# Patient Record
Sex: MALE | Race: WHITE | NOT HISPANIC OR LATINO
[De-identification: names, ages, dates, MRNs, and addresses within clinical notes are randomized per-mention and may not be internally consistent; named-entity substitution may affect disease eponyms.]

---

## 2018-04-09 ENCOUNTER — APPOINTMENT (OUTPATIENT)
Dept: ORTHOPEDIC SURGERY | Facility: CLINIC | Age: 63
End: 2018-04-09
Payer: COMMERCIAL

## 2018-04-09 VITALS
TEMPERATURE: 98.2 F | SYSTOLIC BLOOD PRESSURE: 114 MMHG | HEIGHT: 67 IN | HEART RATE: 67 BPM | BODY MASS INDEX: 28.25 KG/M2 | DIASTOLIC BLOOD PRESSURE: 77 MMHG | WEIGHT: 180 LBS

## 2018-04-09 DIAGNOSIS — Z96.659 INFECTION AND INFLAMMATORY REACTION DUE TO OTHER INTERNAL JOINT PROSTHESIS, INITIAL ENCOUNTER: ICD-10-CM

## 2018-04-09 DIAGNOSIS — T84.84XA PAIN DUE TO INTERNAL ORTHOPEDIC PROSTHETIC DEVICES, IMPLANTS AND GRAFTS, INITIAL ENCOUNTER: ICD-10-CM

## 2018-04-09 DIAGNOSIS — T84.59XA INFECTION AND INFLAMMATORY REACTION DUE TO OTHER INTERNAL JOINT PROSTHESIS, INITIAL ENCOUNTER: ICD-10-CM

## 2018-04-09 DIAGNOSIS — Z96.651 PAIN DUE TO INTERNAL ORTHOPEDIC PROSTHETIC DEVICES, IMPLANTS AND GRAFTS, INITIAL ENCOUNTER: ICD-10-CM

## 2018-04-09 PROCEDURE — 73562 X-RAY EXAM OF KNEE 3: CPT | Mod: RT

## 2018-04-09 PROCEDURE — 99203 OFFICE O/P NEW LOW 30 MIN: CPT

## 2018-04-26 ENCOUNTER — OUTPATIENT (OUTPATIENT)
Dept: OUTPATIENT SERVICES | Facility: HOSPITAL | Age: 63
LOS: 1 days | Discharge: HOME | End: 2018-04-26
Payer: COMMERCIAL

## 2018-04-26 VITALS
WEIGHT: 175.49 LBS | TEMPERATURE: 98 F | RESPIRATION RATE: 18 BRPM | HEIGHT: 67 IN | DIASTOLIC BLOOD PRESSURE: 73 MMHG | HEART RATE: 60 BPM | OXYGEN SATURATION: 97 % | SYSTOLIC BLOOD PRESSURE: 105 MMHG

## 2018-04-26 DIAGNOSIS — M79.605 PAIN IN LEFT LEG: Chronic | ICD-10-CM

## 2018-04-26 DIAGNOSIS — I10 ESSENTIAL (PRIMARY) HYPERTENSION: ICD-10-CM

## 2018-04-26 DIAGNOSIS — I48.91 UNSPECIFIED ATRIAL FIBRILLATION: ICD-10-CM

## 2018-04-26 DIAGNOSIS — M25.569 PAIN IN UNSPECIFIED KNEE: ICD-10-CM

## 2018-04-26 DIAGNOSIS — Z98.890 OTHER SPECIFIED POSTPROCEDURAL STATES: Chronic | ICD-10-CM

## 2018-04-26 DIAGNOSIS — Z01.818 ENCOUNTER FOR OTHER PREPROCEDURAL EXAMINATION: ICD-10-CM

## 2018-04-26 DIAGNOSIS — T84.59XA INFECTION AND INFLAMMATORY REACTION DUE TO OTHER INTERNAL JOINT PROSTHESIS, INITIAL ENCOUNTER: ICD-10-CM

## 2018-04-26 DIAGNOSIS — N40.0 BENIGN PROSTATIC HYPERPLASIA WITHOUT LOWER URINARY TRACT SYMPTOMS: ICD-10-CM

## 2018-04-26 DIAGNOSIS — Z96.659 PRESENCE OF UNSPECIFIED ARTIFICIAL KNEE JOINT: Chronic | ICD-10-CM

## 2018-04-26 LAB
ANION GAP SERPL CALC-SCNC: 7 MMOL/L — SIGNIFICANT CHANGE UP (ref 5–17)
APTT BLD: 39.4 SEC — HIGH (ref 27.5–37.4)
BASOPHILS # BLD AUTO: 0.07 K/UL — SIGNIFICANT CHANGE UP (ref 0–0.2)
BASOPHILS NFR BLD AUTO: 0.9 % — SIGNIFICANT CHANGE UP (ref 0–2)
BUN SERPL-MCNC: 18 MG/DL — SIGNIFICANT CHANGE UP (ref 7–23)
CALCIUM SERPL-MCNC: 9.6 MG/DL — SIGNIFICANT CHANGE UP (ref 8.5–10.1)
CHLORIDE SERPL-SCNC: 104 MMOL/L — SIGNIFICANT CHANGE UP (ref 96–108)
CO2 SERPL-SCNC: 28 MMOL/L — SIGNIFICANT CHANGE UP (ref 22–31)
CREAT SERPL-MCNC: 0.79 MG/DL — SIGNIFICANT CHANGE UP (ref 0.5–1.3)
EOSINOPHIL # BLD AUTO: 0.25 K/UL — SIGNIFICANT CHANGE UP (ref 0–0.5)
EOSINOPHIL NFR BLD AUTO: 3.3 % — SIGNIFICANT CHANGE UP (ref 0–6)
ESTIMATED AVERAGE GLUCOSE: 114 MG/DL — SIGNIFICANT CHANGE UP (ref 68–114)
GLUCOSE SERPL-MCNC: 99 MG/DL — SIGNIFICANT CHANGE UP (ref 70–99)
HBA1C BLD-MCNC: 5.6 % — SIGNIFICANT CHANGE UP (ref 4–5.6)
HBA1C BLD-MCNC: 5.6 % — SIGNIFICANT CHANGE UP (ref 4–5.6)
HCT VFR BLD CALC: 39 % — SIGNIFICANT CHANGE UP (ref 39–50)
HGB BLD-MCNC: 11.9 G/DL — LOW (ref 13–17)
IMM GRANULOCYTES NFR BLD AUTO: 0.4 % — SIGNIFICANT CHANGE UP (ref 0–1.5)
INR BLD: 1.16 RATIO — SIGNIFICANT CHANGE UP (ref 0.88–1.16)
LYMPHOCYTES # BLD AUTO: 1.51 K/UL — SIGNIFICANT CHANGE UP (ref 1–3.3)
LYMPHOCYTES # BLD AUTO: 19.9 % — SIGNIFICANT CHANGE UP (ref 13–44)
MCHC RBC-ENTMCNC: 25 PG — LOW (ref 27–34)
MCHC RBC-ENTMCNC: 30.5 GM/DL — LOW (ref 32–36)
MCV RBC AUTO: 81.9 FL — SIGNIFICANT CHANGE UP (ref 80–100)
MONOCYTES # BLD AUTO: 0.69 K/UL — SIGNIFICANT CHANGE UP (ref 0–0.9)
MONOCYTES NFR BLD AUTO: 9.1 % — SIGNIFICANT CHANGE UP (ref 2–14)
MRSA PCR RESULT.: DETECTED
NEUTROPHILS # BLD AUTO: 5.03 K/UL — SIGNIFICANT CHANGE UP (ref 1.8–7.4)
NEUTROPHILS NFR BLD AUTO: 66.4 % — SIGNIFICANT CHANGE UP (ref 43–77)
PLATELET # BLD AUTO: 291 K/UL — SIGNIFICANT CHANGE UP (ref 150–400)
POTASSIUM SERPL-MCNC: 4.1 MMOL/L — SIGNIFICANT CHANGE UP (ref 3.5–5.3)
POTASSIUM SERPL-SCNC: 4.1 MMOL/L — SIGNIFICANT CHANGE UP (ref 3.5–5.3)
PROTHROM AB SERPL-ACNC: 12.7 SEC — SIGNIFICANT CHANGE UP (ref 9.8–12.7)
RBC # BLD: 4.76 M/UL — SIGNIFICANT CHANGE UP (ref 4.2–5.8)
RBC # FLD: 18.7 % — HIGH (ref 10.3–14.5)
S AUREUS DNA NOSE QL NAA+PROBE: DETECTED
SODIUM SERPL-SCNC: 139 MMOL/L — SIGNIFICANT CHANGE UP (ref 135–145)
WBC # BLD: 7.58 K/UL — SIGNIFICANT CHANGE UP (ref 3.8–10.5)
WBC # FLD AUTO: 7.58 K/UL — SIGNIFICANT CHANGE UP (ref 3.8–10.5)

## 2018-04-26 PROCEDURE — 93010 ELECTROCARDIOGRAM REPORT: CPT | Mod: NC

## 2018-04-26 NOTE — H&P PST ADULT - PMH
Afib    BPH without urinary obstruction    HTN (hypertension)    Wound  right leg - wound care and IV antibiotics

## 2018-04-26 NOTE — PHYSICAL THERAPY INITIAL EVALUATION ADULT - ADDITIONAL COMMENTS
Pt lives with his wife (whom does not work & can provide assistance for all functional mobility as needed upon D/C home) in a private home. One entry step (no rail). Pt states there is a fence that he can hold onto. PT recommends installation of railing or grab bar-pt demonstrates understanding. Pt is independent with all functional mobility including community ambulation without a device. Pt independent with ADL's. Pt is right hand dominant, wears eye glasses for reading, drives, has bilateral hearing aids, & is retired. Pt owns straight cane. Pt has walk in shower stall with retractable shower head & grab bars. Toilet is standard height. Pt reports limited pain throughout the day (no pain meds) but states he avoids stairs as that is the most difficult task to perform. Goal of therapy: improve functional mobility.

## 2018-04-26 NOTE — PATIENT PROFILE ADULT. - PMH
Afib    BPH without urinary obstruction    HTN (hypertension)    PICC (peripherally inserted central catheter) in place  - Left Arm  Wound  right leg - wound care and IV antibiotics

## 2018-04-26 NOTE — PATIENT PROFILE ADULT. - ABILITY TO HEAR (WITH HEARING AID OR HEARING APPLIANCE IF NORMALLY USED):
WEAR BILATERAL HEARING AIDS/Mildly to Moderately Impaired: difficulty hearing in some environments or speaker may need to increase volume or speak distinctly

## 2018-04-26 NOTE — PHYSICAL THERAPY INITIAL EVALUATION ADULT - MODIFIED CLINICAL TEST OF SENSORY INTEGRATION IN BALANCE TEST
5x sit to stand = 17.56 seconds. 2MWT = 320ft with no device (antalgic gait, + decreased becky & step length). Stairs: step-to-step pattern with railing on R for ascent.

## 2018-04-26 NOTE — H&P PST ADULT - HISTORY OF PRESENT ILLNESS
61 yo male -s/p right knee replacement in 2013- did well but recently had cellulitis of right leg- developed infection requiring iv antibiotics and - developed a wound requiring wound care and infectious disease evaluations currently has wound of right leg, had IV antibiotics via picc line - scheduled for removal of right knee device for spacer and antibiotics until ready for revision of right knee arthroplasty.  PMH - afib htn, hyperlipidemia

## 2018-04-26 NOTE — H&P PST ADULT - NSANTHOSAYNRD_GEN_A_CORE
No. JEFFREY screening performed.  STOP BANG Legend: 0-2 = LOW Risk; 3-4 = INTERMEDIATE Risk; 5-8 = HIGH Risk/denies

## 2018-04-27 DIAGNOSIS — T14.90XA INJURY, UNSPECIFIED, INITIAL ENCOUNTER: ICD-10-CM

## 2018-04-27 RX ORDER — MUPIROCIN 20 MG/G
1 OINTMENT TOPICAL
Qty: 1 | Refills: 0 | OUTPATIENT
Start: 2018-04-27 | End: 2018-05-01

## 2018-05-02 ENCOUNTER — TRANSCRIPTION ENCOUNTER (OUTPATIENT)
Age: 63
End: 2018-05-02

## 2018-05-03 ENCOUNTER — INPATIENT (INPATIENT)
Facility: HOSPITAL | Age: 63
LOS: 3 days | Discharge: HOME HEALTH | End: 2018-05-07
Attending: ORTHOPAEDIC SURGERY | Admitting: ORTHOPAEDIC SURGERY
Payer: COMMERCIAL

## 2018-05-03 ENCOUNTER — RESULT REVIEW (OUTPATIENT)
Age: 63
End: 2018-05-03

## 2018-05-03 VITALS — WEIGHT: 175.05 LBS | HEIGHT: 67 IN

## 2018-05-03 DIAGNOSIS — Z96.659 PRESENCE OF UNSPECIFIED ARTIFICIAL KNEE JOINT: Chronic | ICD-10-CM

## 2018-05-03 DIAGNOSIS — M79.605 PAIN IN LEFT LEG: Chronic | ICD-10-CM

## 2018-05-03 DIAGNOSIS — Z98.890 OTHER SPECIFIED POSTPROCEDURAL STATES: Chronic | ICD-10-CM

## 2018-05-03 LAB
HCT VFR BLD CALC: 35.2 % — LOW (ref 39–50)
HGB BLD-MCNC: 10.5 G/DL — LOW (ref 13–17)
MCHC RBC-ENTMCNC: 24.6 PG — LOW (ref 27–34)
MCHC RBC-ENTMCNC: 29.8 GM/DL — LOW (ref 32–36)
MCV RBC AUTO: 82.6 FL — SIGNIFICANT CHANGE UP (ref 80–100)
NRBC # BLD: 0 /100 WBCS — SIGNIFICANT CHANGE UP (ref 0–0)
PLATELET # BLD AUTO: 222 K/UL — SIGNIFICANT CHANGE UP (ref 150–400)
RBC # BLD: 4.26 M/UL — SIGNIFICANT CHANGE UP (ref 4.2–5.8)
RBC # FLD: 18.6 % — HIGH (ref 10.3–14.5)
WBC # BLD: 8.59 K/UL — SIGNIFICANT CHANGE UP (ref 3.8–10.5)
WBC # FLD AUTO: 8.59 K/UL — SIGNIFICANT CHANGE UP (ref 3.8–10.5)

## 2018-05-03 PROCEDURE — 73560 X-RAY EXAM OF KNEE 1 OR 2: CPT | Mod: 26,RT

## 2018-05-03 PROCEDURE — 88300 SURGICAL PATH GROSS: CPT | Mod: 26,59

## 2018-05-03 PROCEDURE — 99223 1ST HOSP IP/OBS HIGH 75: CPT

## 2018-05-03 PROCEDURE — 88305 TISSUE EXAM BY PATHOLOGIST: CPT | Mod: 26

## 2018-05-03 RX ORDER — SENNA PLUS 8.6 MG/1
2 TABLET ORAL AT BEDTIME
Qty: 0 | Refills: 0 | Status: DISCONTINUED | OUTPATIENT
Start: 2018-05-03 | End: 2018-05-07

## 2018-05-03 RX ORDER — MAGNESIUM HYDROXIDE 400 MG/1
30 TABLET, CHEWABLE ORAL DAILY
Qty: 0 | Refills: 0 | Status: DISCONTINUED | OUTPATIENT
Start: 2018-05-03 | End: 2018-05-07

## 2018-05-03 RX ORDER — SODIUM CHLORIDE 9 MG/ML
1000 INJECTION, SOLUTION INTRAVENOUS
Qty: 0 | Refills: 0 | Status: DISCONTINUED | OUTPATIENT
Start: 2018-05-03 | End: 2018-05-03

## 2018-05-03 RX ORDER — OXYCODONE HYDROCHLORIDE 5 MG/1
5 TABLET ORAL EVERY 4 HOURS
Qty: 0 | Refills: 0 | Status: DISCONTINUED | OUTPATIENT
Start: 2018-05-03 | End: 2018-05-07

## 2018-05-03 RX ORDER — MORPHINE SULFATE 50 MG/1
2 CAPSULE, EXTENDED RELEASE ORAL
Qty: 0 | Refills: 0 | Status: DISCONTINUED | OUTPATIENT
Start: 2018-05-03 | End: 2018-05-03

## 2018-05-03 RX ORDER — PANTOPRAZOLE SODIUM 20 MG/1
40 TABLET, DELAYED RELEASE ORAL DAILY
Qty: 0 | Refills: 0 | Status: DISCONTINUED | OUTPATIENT
Start: 2018-05-03 | End: 2018-05-07

## 2018-05-03 RX ORDER — FOLIC ACID 0.8 MG
1 TABLET ORAL DAILY
Qty: 0 | Refills: 0 | Status: DISCONTINUED | OUTPATIENT
Start: 2018-05-03 | End: 2018-05-07

## 2018-05-03 RX ORDER — ACETAMINOPHEN 500 MG
650 TABLET ORAL EVERY 6 HOURS
Qty: 0 | Refills: 0 | Status: DISCONTINUED | OUTPATIENT
Start: 2018-05-03 | End: 2018-05-07

## 2018-05-03 RX ORDER — OXYCODONE HYDROCHLORIDE 5 MG/1
20 TABLET ORAL ONCE
Qty: 0 | Refills: 0 | Status: DISCONTINUED | OUTPATIENT
Start: 2018-05-03 | End: 2018-05-03

## 2018-05-03 RX ORDER — VANCOMYCIN HCL 1 G
1000 VIAL (EA) INTRAVENOUS EVERY 12 HOURS
Qty: 0 | Refills: 0 | Status: DISCONTINUED | OUTPATIENT
Start: 2018-05-04 | End: 2018-05-07

## 2018-05-03 RX ORDER — ACETAMINOPHEN 500 MG
650 TABLET ORAL ONCE
Qty: 0 | Refills: 0 | Status: COMPLETED | OUTPATIENT
Start: 2018-05-03 | End: 2018-05-03

## 2018-05-03 RX ORDER — SODIUM CHLORIDE 9 MG/ML
1000 INJECTION INTRAMUSCULAR; INTRAVENOUS; SUBCUTANEOUS
Qty: 0 | Refills: 0 | Status: DISCONTINUED | OUTPATIENT
Start: 2018-05-03 | End: 2018-05-07

## 2018-05-03 RX ORDER — POLYETHYLENE GLYCOL 3350 17 G/17G
17 POWDER, FOR SOLUTION ORAL DAILY
Qty: 0 | Refills: 0 | Status: DISCONTINUED | OUTPATIENT
Start: 2018-05-03 | End: 2018-05-07

## 2018-05-03 RX ORDER — CEFAZOLIN SODIUM 1 G
2000 VIAL (EA) INJECTION EVERY 8 HOURS
Qty: 0 | Refills: 0 | Status: DISCONTINUED | OUTPATIENT
Start: 2018-05-03 | End: 2018-05-03

## 2018-05-03 RX ORDER — HYDROMORPHONE HYDROCHLORIDE 2 MG/ML
1 INJECTION INTRAMUSCULAR; INTRAVENOUS; SUBCUTANEOUS EVERY 4 HOURS
Qty: 0 | Refills: 0 | Status: DISCONTINUED | OUTPATIENT
Start: 2018-05-03 | End: 2018-05-07

## 2018-05-03 RX ORDER — ASCORBIC ACID 60 MG
500 TABLET,CHEWABLE ORAL
Qty: 0 | Refills: 0 | Status: DISCONTINUED | OUTPATIENT
Start: 2018-05-03 | End: 2018-05-07

## 2018-05-03 RX ORDER — TRANEXAMIC ACID 100 MG/ML
1000 INJECTION, SOLUTION INTRAVENOUS ONCE
Qty: 0 | Refills: 0 | Status: COMPLETED | OUTPATIENT
Start: 2018-05-03 | End: 2018-05-03

## 2018-05-03 RX ORDER — RIVAROXABAN 15 MG-20MG
10 KIT ORAL DAILY
Qty: 0 | Refills: 0 | Status: DISCONTINUED | OUTPATIENT
Start: 2018-05-05 | End: 2018-05-07

## 2018-05-03 RX ORDER — METOPROLOL TARTRATE 50 MG
50 TABLET ORAL
Qty: 0 | Refills: 0 | Status: DISCONTINUED | OUTPATIENT
Start: 2018-05-03 | End: 2018-05-04

## 2018-05-03 RX ORDER — CELECOXIB 200 MG/1
200 CAPSULE ORAL EVERY 12 HOURS
Qty: 0 | Refills: 0 | Status: DISCONTINUED | OUTPATIENT
Start: 2018-05-04 | End: 2018-05-07

## 2018-05-03 RX ORDER — TAMSULOSIN HYDROCHLORIDE 0.4 MG/1
0.4 CAPSULE ORAL DAILY
Qty: 0 | Refills: 0 | Status: DISCONTINUED | OUTPATIENT
Start: 2018-05-03 | End: 2018-05-07

## 2018-05-03 RX ORDER — ONDANSETRON 8 MG/1
4 TABLET, FILM COATED ORAL EVERY 6 HOURS
Qty: 0 | Refills: 0 | Status: DISCONTINUED | OUTPATIENT
Start: 2018-05-03 | End: 2018-05-05

## 2018-05-03 RX ORDER — DOCUSATE SODIUM 100 MG
100 CAPSULE ORAL THREE TIMES A DAY
Qty: 0 | Refills: 0 | Status: DISCONTINUED | OUTPATIENT
Start: 2018-05-03 | End: 2018-05-07

## 2018-05-03 RX ORDER — CEFEPIME 1 G/1
2000 INJECTION, POWDER, FOR SOLUTION INTRAMUSCULAR; INTRAVENOUS EVERY 12 HOURS
Qty: 0 | Refills: 0 | Status: DISCONTINUED | OUTPATIENT
Start: 2018-05-03 | End: 2018-05-07

## 2018-05-03 RX ORDER — ATORVASTATIN CALCIUM 80 MG/1
10 TABLET, FILM COATED ORAL DAILY
Qty: 0 | Refills: 0 | Status: DISCONTINUED | OUTPATIENT
Start: 2018-05-03 | End: 2018-05-07

## 2018-05-03 RX ORDER — SODIUM CHLORIDE 9 MG/ML
3 INJECTION INTRAMUSCULAR; INTRAVENOUS; SUBCUTANEOUS EVERY 8 HOURS
Qty: 0 | Refills: 0 | Status: DISCONTINUED | OUTPATIENT
Start: 2018-05-03 | End: 2018-05-03

## 2018-05-03 RX ORDER — CELECOXIB 200 MG/1
200 CAPSULE ORAL ONCE
Qty: 0 | Refills: 0 | Status: COMPLETED | OUTPATIENT
Start: 2018-05-03 | End: 2018-05-03

## 2018-05-03 RX ORDER — OXYCODONE HYDROCHLORIDE 5 MG/1
10 TABLET ORAL EVERY 4 HOURS
Qty: 0 | Refills: 0 | Status: DISCONTINUED | OUTPATIENT
Start: 2018-05-03 | End: 2018-05-07

## 2018-05-03 RX ADMIN — Medication 650 MILLIGRAM(S): at 12:07

## 2018-05-03 RX ADMIN — OXYCODONE HYDROCHLORIDE 20 MILLIGRAM(S): 5 TABLET ORAL at 12:07

## 2018-05-03 RX ADMIN — CEFEPIME 100 MILLIGRAM(S): 1 INJECTION, POWDER, FOR SOLUTION INTRAMUSCULAR; INTRAVENOUS at 22:53

## 2018-05-03 RX ADMIN — TRANEXAMIC ACID 220 MILLIGRAM(S): 100 INJECTION, SOLUTION INTRAVENOUS at 17:50

## 2018-05-03 RX ADMIN — CELECOXIB 200 MILLIGRAM(S): 200 CAPSULE ORAL at 12:07

## 2018-05-03 RX ADMIN — SODIUM CHLORIDE 100 MILLILITER(S): 9 INJECTION, SOLUTION INTRAVENOUS at 17:50

## 2018-05-03 NOTE — PROGRESS NOTE ADULT - SUBJECTIVE AND OBJECTIVE BOX
Ortho Postop Check:    61yo Male seen and examined at bedside s/p Revision of R TKA, removal of hardware and insertion of antibiotic spacer POD 0. Patient denies complaints, and pain well controlled at this time. Tolerating diet, no N/V.     Vital Signs Last 24 Hrs  T(F): 96.8 (05-03-18 @ 19:55), Max: 97.5 (05-03-18 @ 12:04)  HR: 58 (05-03-18 @ 20:25)  BP: 99/59 (05-03-18 @ 20:25)  RR: 15 (05-03-18 @ 20:25)  SpO2: 98% (05-03-18 @ 20:25)    Physical Exam:    GENERAL: A&Ox3, NAD  CHEST/LUNG: Clear to auscultation bilaterally, respirations nonlabored  HEART: S1S2, Regular rate and rhythm  ABDOMEN: Soft, NT/ND  EXTREMITIES: Right Knee dressing CDI with knee immobilizer in place. LISA and Hemovac drains intact draining bloody fluid. Sensation and strength intact to LEs b/l, +dorsiflex/plantar flex. Compartments soft, and calf nontender. 2+ DP/PT pulses b/l.     Assessment: 62M s/p Removal of hardware from right TKA and insertion of antibiotic spacer POD 0.     Plan:  -ABX and follow up ID consult  -Reg diet as tolerated  -Continue local wound care, LISA/Hemovac drain care- monitor output  -knee immobilizer to keep Right knee in extension  -pain management prn  -Prophylactic measures: DVT prophylaxis, encourage Incentive Spirometer, OOB, NWB to RLE, Physical therapy  -continue current medical management/supportive care  -F/u am labs  -will discuss with ortho attending

## 2018-05-03 NOTE — PATIENT PROFILE ADULT. - PSH
Acute leg pain, left  s/p femur surgery - gisela  S/P inguinal hernia repair  Right  S/P knee replacement  bilateral

## 2018-05-04 ENCOUNTER — TRANSCRIPTION ENCOUNTER (OUTPATIENT)
Age: 63
End: 2018-05-04

## 2018-05-04 LAB
ANION GAP SERPL CALC-SCNC: 7 MMOL/L — SIGNIFICANT CHANGE UP (ref 5–17)
BUN SERPL-MCNC: 19 MG/DL — SIGNIFICANT CHANGE UP (ref 7–23)
CALCIUM SERPL-MCNC: 9.1 MG/DL — SIGNIFICANT CHANGE UP (ref 8.5–10.1)
CHLORIDE SERPL-SCNC: 104 MMOL/L — SIGNIFICANT CHANGE UP (ref 96–108)
CO2 SERPL-SCNC: 28 MMOL/L — SIGNIFICANT CHANGE UP (ref 22–31)
CREAT SERPL-MCNC: 0.73 MG/DL — SIGNIFICANT CHANGE UP (ref 0.5–1.3)
GLUCOSE SERPL-MCNC: 90 MG/DL — SIGNIFICANT CHANGE UP (ref 70–99)
GRAM STN FLD: SIGNIFICANT CHANGE UP
HCT VFR BLD CALC: 33.2 % — LOW (ref 39–50)
HGB BLD-MCNC: 10 G/DL — LOW (ref 13–17)
MCHC RBC-ENTMCNC: 25 PG — LOW (ref 27–34)
MCHC RBC-ENTMCNC: 30.1 GM/DL — LOW (ref 32–36)
MCV RBC AUTO: 83 FL — SIGNIFICANT CHANGE UP (ref 80–100)
NRBC # BLD: 0 /100 WBCS — SIGNIFICANT CHANGE UP (ref 0–0)
PLATELET # BLD AUTO: 216 K/UL — SIGNIFICANT CHANGE UP (ref 150–400)
POTASSIUM SERPL-MCNC: 4 MMOL/L — SIGNIFICANT CHANGE UP (ref 3.5–5.3)
POTASSIUM SERPL-SCNC: 4 MMOL/L — SIGNIFICANT CHANGE UP (ref 3.5–5.3)
RBC # BLD: 4 M/UL — LOW (ref 4.2–5.8)
RBC # FLD: 18.6 % — HIGH (ref 10.3–14.5)
SODIUM SERPL-SCNC: 139 MMOL/L — SIGNIFICANT CHANGE UP (ref 135–145)
SPECIMEN SOURCE: SIGNIFICANT CHANGE UP
WBC # BLD: 7.35 K/UL — SIGNIFICANT CHANGE UP (ref 3.8–10.5)
WBC # FLD AUTO: 7.35 K/UL — SIGNIFICANT CHANGE UP (ref 3.8–10.5)

## 2018-05-04 PROCEDURE — 99233 SBSQ HOSP IP/OBS HIGH 50: CPT

## 2018-05-04 RX ORDER — METOPROLOL TARTRATE 50 MG
25 TABLET ORAL
Qty: 0 | Refills: 0 | Status: DISCONTINUED | OUTPATIENT
Start: 2018-05-04 | End: 2018-05-07

## 2018-05-04 RX ADMIN — ATORVASTATIN CALCIUM 10 MILLIGRAM(S): 80 TABLET, FILM COATED ORAL at 11:41

## 2018-05-04 RX ADMIN — CEFEPIME 100 MILLIGRAM(S): 1 INJECTION, POWDER, FOR SOLUTION INTRAMUSCULAR; INTRAVENOUS at 21:41

## 2018-05-04 RX ADMIN — SODIUM CHLORIDE 110 MILLILITER(S): 9 INJECTION INTRAMUSCULAR; INTRAVENOUS; SUBCUTANEOUS at 17:42

## 2018-05-04 RX ADMIN — Medication 25 MILLIGRAM(S): at 17:41

## 2018-05-04 RX ADMIN — SODIUM CHLORIDE 110 MILLILITER(S): 9 INJECTION INTRAMUSCULAR; INTRAVENOUS; SUBCUTANEOUS at 11:42

## 2018-05-04 RX ADMIN — Medication 500 MILLIGRAM(S): at 05:47

## 2018-05-04 RX ADMIN — Medication 500 MILLIGRAM(S): at 17:41

## 2018-05-04 RX ADMIN — Medication 100 MILLIGRAM(S): at 14:01

## 2018-05-04 RX ADMIN — Medication 1 MILLIGRAM(S): at 11:41

## 2018-05-04 RX ADMIN — Medication 1 TABLET(S): at 11:41

## 2018-05-04 RX ADMIN — Medication 250 MILLIGRAM(S): at 02:38

## 2018-05-04 RX ADMIN — PANTOPRAZOLE SODIUM 40 MILLIGRAM(S): 20 TABLET, DELAYED RELEASE ORAL at 11:41

## 2018-05-04 RX ADMIN — CEFEPIME 100 MILLIGRAM(S): 1 INJECTION, POWDER, FOR SOLUTION INTRAMUSCULAR; INTRAVENOUS at 09:39

## 2018-05-04 RX ADMIN — TAMSULOSIN HYDROCHLORIDE 0.4 MILLIGRAM(S): 0.4 CAPSULE ORAL at 11:42

## 2018-05-04 RX ADMIN — CELECOXIB 200 MILLIGRAM(S): 200 CAPSULE ORAL at 05:47

## 2018-05-04 RX ADMIN — CELECOXIB 200 MILLIGRAM(S): 200 CAPSULE ORAL at 17:40

## 2018-05-04 RX ADMIN — Medication 250 MILLIGRAM(S): at 14:00

## 2018-05-04 NOTE — DISCHARGE NOTE ADULT - PATIENT PORTAL LINK FT
You can access the [facility] Patient Portal, offered by [facility], by registering with the following website: [email or web address]

## 2018-05-04 NOTE — OCCUPATIONAL THERAPY INITIAL EVALUATION ADULT - GENERAL OBSERVATIONS, REHAB EVAL
Pt was seen for initial OT consult encountered supin in bed;  Pt was observed with IV infusing ; right [place] brace was donned and locked in extension. Pt AA&Ox4, cooperative & followed commands.All precautions were reviewed & maintained. Pt c/o right knee pain due to s/p right TK resection. This limits pt's activity tolerance, ADL management balance and functional mobility. Pt was seen for initial OT consult encountered supine  in bed;  Pt was observed with IV infusing ; right [place] brace was donned and locked in extension. Pt AA&Ox4, cooperative & followed commands.All precautions were reviewed & maintained. Pt c/o right knee pain due to s/p right TK resection. This limits pt's activity tolerance, ADL management , balance and functional mobility.

## 2018-05-04 NOTE — PHYSICAL THERAPY INITIAL EVALUATION ADULT - ADDITIONAL COMMENTS
Per patient, lives with wife in private house c x1 stair step to enter c x1 handrail. Wife will be available for support post-op. From house entrance, main level has all amenities. Denies use of mobility devices prior to admission. Endorses already has 3:1 commode and rolling walker at home.

## 2018-05-04 NOTE — CONSULT NOTE ADULT - ASSESSMENT
s/p right knee replacement in 2013 then cellulitis of right leg on IV antibiotics    s/p Revision of R TKA, removal of hardware and insertion of antibiotic spacer with LISA drain   On cefepime and vanco for emperic coverage   plan for revision of right knee arthroplasty?   Wound culture so far negative   Surveillance mrsa pos   will send blood culture  NOTE TO BE CONTINUED  EVALUATION IN PROGRESS
[age] y/o male POD 0 s/p removal of right knee hardware ad spacer placement. Patient encountered in PACU bradycardiac while sleeping but improved while awake   would resume metoprolol in am   Patient with history of paroxsymal afib low CRISTOFER score only on ASA  Standard ortho protocol   PT   DVT prophylaxis   should follow up with  ID immediatly after discharge to discuss any continued abx course

## 2018-05-04 NOTE — DISCHARGE NOTE ADULT - NS AS ACTIVITY OBS
Do not drive or operate machinery/Showering allowed/No Heavy lifting/straining/Walking-Indoors allowed/Stairs allowed/Walking-Outdoors allowed

## 2018-05-04 NOTE — PHYSICAL THERAPY INITIAL EVALUATION ADULT - GENERAL OBSERVATIONS, REHAB EVAL
Patient encountered sat up on recliner chair. vital signs as charted. Attachments: right knee dre set to 0 degree extension; left arm PICC; x1 LISA and Hemovac drain; right knee acquacel dressing clean/dry/intact. AAOx4. Denies pain or shortness of breath at rest. Neurovascularly intact on both lower limbs.

## 2018-05-04 NOTE — OCCUPATIONAL THERAPY INITIAL EVALUATION ADULT - PLANNED THERAPY INTERVENTIONS, OT EVAL
ADL retraining/bed mobility training/strengthening/balance training/parent/caregiver training.../transfer training/energy conservation techniques/IADL retraining/neuromuscular re-education

## 2018-05-04 NOTE — DISCHARGE NOTE ADULT - HOME CARE AGENCY
[facility] -[facility] IV infusion

## 2018-05-04 NOTE — OCCUPATIONAL THERAPY INITIAL EVALUATION ADULT - PERSONAL SAFETY AND JUDGMENT, REHAB EVAL
Pt  needs verbal cues for proper hand and foot placement during transfer./intact/at risk behaviors demonstrated

## 2018-05-04 NOTE — DISCHARGE NOTE ADULT - MEDICATION SUMMARY - MEDICATIONS TO STOP TAKING
I will STOP taking the medications listed below when I get home from the hospital:    aspirin 81 mg oral tablet  -- 1 tab(s) by mouth once a day    mupirocin 2% topical ointment  -- Apply on skin to affected area 2 times a day MDD:2 apply nasally bilateral  -- For external use only.

## 2018-05-04 NOTE — DISCHARGE NOTE ADULT - MEDICATION SUMMARY - MEDICATIONS TO TAKE
I will START or STAY ON the medications listed below when I get home from the hospital:    Omega-3 Fish oil  1000 mg  -- 1 cap(s) by mouth once a day  -- Indication: For RIGHT TOTAL KNEE ARTHROPLASTY RESECTION    [place] brace  -- 1 [place] brace locked in extension    Dx: R TKA infection/ abx spacer   -- Indication: For RIGHT TOTAL KNEE ARTHROPLASTY RESECTION    acetaminophen 325 mg oral tablet  -- 2 tab(s) by mouth every 6 hours, As needed, For Temp over 38.3 C (100.94 F)  -- Indication: For RIGHT TOTAL KNEE ARTHROPLASTY RESECTION    celecoxib 200 mg oral capsule  -- 1 cap(s) by mouth every 12 hours MDD:2 Tabs  -- Indication: For RIGHT TOTAL KNEE ARTHROPLASTY RESECTION    oxyCODONE 10 mg oral tablet  -- 1 tab(s) by mouth every 4 hours, As needed, Pain 6 - 10 MDD:6 Tabs  -- Indication: For RIGHT TOTAL KNEE ARTHROPLASTY RESECTION    tamsulosin 0.4 mg oral capsule  -- 1 cap(s) by mouth once a day  -- Indication: For RIGHT TOTAL KNEE ARTHROPLASTY RESECTION    rivaroxaban 10 mg oral tablet  -- 1 tab(s) by mouth once a day MDD:1 Tab  -- Indication: For RIGHT TOTAL KNEE ARTHROPLASTY RESECTION    atorvastatin 10 mg oral tablet  -- 1 tab(s) by mouth once a day  -- Indication: For RIGHT TOTAL KNEE ARTHROPLASTY RESECTION    Metoprolol Tartrate 50 mg oral tablet  -- 1 tab(s) by mouth 2 times a day  -- Indication: For RIGHT TOTAL KNEE ARTHROPLASTY RESECTION    cefTRIAXone 2 g intravenous injection  -- 1 dose(s) intravenous once a day x 6 weeks   -- Indication: For RIGHT TOTAL KNEE ARTHROPLASTY RESECTION    Garlic oral tablet  -- 1 tab(s) by mouth once a day  -- Indication: For RIGHT TOTAL KNEE ARTHROPLASTY RESECTION    docusate sodium 100 mg oral capsule  -- 1 cap(s) by mouth 3 times a day  -- Indication: For RIGHT TOTAL KNEE ARTHROPLASTY RESECTION    DAPTOmycin 500 mg intravenous injection  -- 1 dose(s) intravenous once a day x 6 weeks   -- Indication: For RIGHT TOTAL KNEE ARTHROPLASTY RESECTION    pantoprazole 40 mg oral delayed release tablet  -- 1 tab(s) by mouth once a day MDD:1 Tab  -- Indication: For RIGHT TOTAL KNEE ARTHROPLASTY RESECTION    ascorbic acid 500 mg oral tablet  -- 1 tab(s) by mouth 2 times a day  -- Indication: For RIGHT TOTAL KNEE ARTHROPLASTY RESECTION    folic acid 1 mg oral tablet  -- 1 tab(s) by mouth once a day  -- Indication: For RIGHT TOTAL KNEE ARTHROPLASTY RESECTION I will START or STAY ON the medications listed below when I get home from the hospital:    Omega-3 Fish oil  1000 mg  -- 1 cap(s) by mouth once a day  -- Indication: For RIGHT TOTAL KNEE ARTHROPLASTY RESECTION    [place] brace  -- 1 [place] brace locked in extension    Dx: R TKA infection/ abx spacer   -- Indication: For RIGHT TOTAL KNEE ARTHROPLASTY RESECTION    acetaminophen 325 mg oral tablet  -- 2 tab(s) by mouth every 6 hours, As needed, For Temp over 38.3 C (100.94 F)  -- Indication: For RIGHT TOTAL KNEE ARTHROPLASTY RESECTION    celecoxib 200 mg oral capsule  -- 1 cap(s) by mouth every 12 hours MDD:2 Tabs  -- Indication: For RIGHT TOTAL KNEE ARTHROPLASTY RESECTION    oxyCODONE 10 mg oral tablet  -- 1 tab(s) by mouth every 4 hours, As needed, Pain 6 - 10 MDD:6 Tabs  -- Indication: For RIGHT TOTAL KNEE ARTHROPLASTY RESECTION    tamsulosin 0.4 mg oral capsule  -- 1 cap(s) by mouth once a day  -- Indication: For RIGHT TOTAL KNEE ARTHROPLASTY RESECTION    rivaroxaban 10 mg oral tablet  -- 1 tab(s) by mouth once a day MDD:1 Tab  -- Indication: For RIGHT TOTAL KNEE ARTHROPLASTY RESECTION    atorvastatin 10 mg oral tablet  -- 1 tab(s) by mouth once a day  -- Indication: For RIGHT TOTAL KNEE ARTHROPLASTY RESECTION    Metoprolol Tartrate 50 mg oral tablet  -- 1 tab(s) by mouth 2 times a day  -- Indication: For RIGHT TOTAL KNEE ARTHROPLASTY RESECTION    cefTRIAXone 2 g intravenous injection  -- 2 gram(s) intravenous every 24 hours  -- Indication: For RIGHT TOTAL KNEE ARTHROPLASTY RESECTION    cefTRIAXone 2 g intravenous injection  -- 1 dose(s) intravenous once a day x 6 weeks   -- Indication: For RIGHT TOTAL KNEE ARTHROPLASTY RESECTION    Garlic oral tablet  -- 1 tab(s) by mouth once a day  -- Indication: For RIGHT TOTAL KNEE ARTHROPLASTY RESECTION    docusate sodium 100 mg oral capsule  -- 1 cap(s) by mouth 3 times a day  -- Indication: For RIGHT TOTAL KNEE ARTHROPLASTY RESECTION    DAPTOmycin 500 mg intravenous injection  -- 1 dose(s) intravenous once a day x 6 weeks   -- Indication: For RIGHT TOTAL KNEE ARTHROPLASTY RESECTION    DAPTOmycin 500 mg intravenous injection  -- 500 milligram(s) intravenous every 24 hours  -- Indication: For RIGHT TOTAL KNEE ARTHROPLASTY RESECTION    pantoprazole 40 mg oral delayed release tablet  -- 1 tab(s) by mouth once a day MDD:1 Tab  -- Indication: For RIGHT TOTAL KNEE ARTHROPLASTY RESECTION    ascorbic acid 500 mg oral tablet  -- 1 tab(s) by mouth 2 times a day  -- Indication: For RIGHT TOTAL KNEE ARTHROPLASTY RESECTION    folic acid 1 mg oral tablet  -- 1 tab(s) by mouth once a day  -- Indication: For RIGHT TOTAL KNEE ARTHROPLASTY RESECTION

## 2018-05-04 NOTE — DISCHARGE NOTE ADULT - ADDITIONAL INSTRUCTIONS
Please call your MD, if you have new onset of fevers, increased drainage, increased pain or increased redness around the incision site. Please return to the Emergency Department if you have chest pain or shortness of breath.  F/U with Dr. Estevez in 1 week, F/U with Dr. Alonzo 2 weeks

## 2018-05-04 NOTE — PHYSICAL THERAPY INITIAL EVALUATION ADULT - CRITERIA FOR SKILLED THERAPEUTIC INTERVENTIONS
impairments found/therapy frequency/anticipated discharge recommendation/functional limitations in following categories/risk reduction/prevention/rehab potential

## 2018-05-04 NOTE — DISCHARGE NOTE ADULT - CARE PLAN
Principal Discharge DX:	Status post right knee replacement  Goal:	Improve Function, Decrease Pain  Assessment and plan of treatment:	Keep Dressing Clean, Dry and Intact. May shower on POD#5 with Dressing on. Dressing may be removed on POD#7. Please do not scrub, soak, peel or pick at the dressing. No creams, lotions, or oils over dressing. May shower on POD#5 and let water run over dressing, no baths. Pat dry once out of shower.     If dressing is saturated from border to border. Remove dressing and cover with clean dry dressing.

## 2018-05-04 NOTE — PROGRESS NOTE ADULT - SUBJECTIVE AND OBJECTIVE BOX
Pt seen and examined   NAD    Right knee dressing is CDI and flat. Immobilizer in place  No collections  HVx1 and LISA x1 intact and appropriate    A/P: Pt is stable.  Cont care per primary team  Cont to monitor drain outputs  Will follow

## 2018-05-04 NOTE — PROGRESS NOTE ADULT - SUBJECTIVE AND OBJECTIVE BOX
Patient is a 62y old  Male who presents with a chief complaint of right knee infection caused by hardware (03 May 2018 11:35)      INTERVAL HPI/OVERNIGHT EVENTS: no acute events overnight doing well     MEDICATIONS  (STANDING):  ascorbic acid 500 milliGRAM(s) Oral two times a day  atorvastatin Oral Tab/Cap - Peds 10 milliGRAM(s) Oral daily  cefepime   IVPB 2000 milliGRAM(s) IV Intermittent every 12 hours  celecoxib 200 milliGRAM(s) Oral every 12 hours  docusate sodium 100 milliGRAM(s) Oral three times a day  folic acid 1 milliGRAM(s) Oral daily  metoprolol tartrate 50 milliGRAM(s) Oral two times a day  multivitamin 1 Tablet(s) Oral daily  pantoprazole    Tablet 40 milliGRAM(s) Oral daily  polyethylene glycol 3350 17 Gram(s) Oral daily  sodium chloride 0.9%. 1000 milliLiter(s) (110 mL/Hr) IV Continuous <Continuous>  tamsulosin 0.4 milliGRAM(s) Oral daily  vancomycin  IVPB 1000 milliGRAM(s) IV Intermittent every 12 hours    MEDICATIONS  (PRN):  acetaminophen   Tablet 650 milliGRAM(s) Oral every 6 hours PRN For Temp over 38.3 C (100.94 F)  aluminum hydroxide/magnesium hydroxide/simethicone Suspension 30 milliLiter(s) Oral four times a day PRN Indigestion  HYDROmorphone  Injectable 1 milliGRAM(s) IV Push every 4 hours PRN breakthrough  magnesium hydroxide Suspension 30 milliLiter(s) Oral daily PRN Constipation  ondansetron Injectable 4 milliGRAM(s) IV Push every 6 hours PRN Nausea and/or Vomiting  oxyCODONE    IR 5 milliGRAM(s) Oral every 4 hours PRN Pain 1 - 5  oxyCODONE    IR 10 milliGRAM(s) Oral every 4 hours PRN Pain 6 - 10  senna 2 Tablet(s) Oral at bedtime PRN Constipation      Allergies    latex (Rash)  No Known Drug Allergies    Intolerances        REVIEW OF SYSTEMS:  CONSTITUTIONAL: No fever, weight loss, or fatigue  EYES: No eye pain, visual disturbances, or discharge  ENMT:  No difficulty hearing, tinnitus, vertigo; No sinus or throat pain  NECK: No pain or stiffness  BREASTS: No pain, masses, or nipple discharge  RESPIRATORY: No cough, wheezing, chills or hemoptysis; No shortness of breath  CARDIOVASCULAR: No chest pain, palpitations, dizziness, or leg swelling  GASTROINTESTINAL: No abdominal or epigastric pain. No nausea, vomiting, or hematemesis; No diarrhea or constipation. No melena or hematochezia.  GENITOURINARY: No dysuria, frequency, hematuria, or incontinence  NEUROLOGICAL: No headaches, memory loss, loss of strength, numbness, or tremors  SKIN: No itching, burning, rashes, or lesions   LYMPH NODES: No enlarged glands  ENDOCRINE: No heat or cold intolerance; No hair loss  MUSCULOSKELETAL: No joint pain or swelling; No muscle, back, or extremity pain  PSYCHIATRIC: No depression, anxiety, mood swings, or difficulty sleeping  HEME/LYMPH: No easy bruising, or bleeding gums  ALLERGY AND IMMUNOLOGIC: No hives or eczema    Vital Signs Last 24 Hrs  T(C): 36.5 (04 May 2018 11:08), Max: 36.6 (03 May 2018 21:30)  T(F): 97.7 (04 May 2018 11:08), Max: 97.8 (03 May 2018 21:30)  HR: 62 (04 May 2018 11:08) (43 - 65)  BP: 106/64 (04 May 2018 11:08) (88/55 - 123/66)  BP(mean): --  RR: 18 (04 May 2018 11:08) (14 - 18)  SpO2: 96% (04 May 2018 11:08) (95% - 100%)    PHYSICAL EXAM:  GENERAL: NAD, well-groomed, well-developed  HEAD:  Atraumatic, Normocephalic  EYES: EOMI, PERRLA, conjunctiva and sclera clear  ENMT: No tonsillar erythema, exudates, or enlargement; Moist mucous membranes, Good dentition, No lesions  NECK: Supple, No JVD, Normal thyroid  NERVOUS SYSTEM:  Alert & Oriented X3, Good concentration; Motor Strength 5/5 B/L upper and lower extremities; DTRs 2+ intact and symmetric  CHEST/LUNG: Clear to percussion bilaterally; No rales, rhonchi, wheezing, or rubs  HEART: Regular rate and rhythm; No murmurs, rubs, or gallops  ABDOMEN: Soft, Nontender, Nondistended; Bowel sounds present  EXTREMITIES:  hemovak right knee left arm PICC efline   LYMPH: No lymphadenopathy noted  SKIN: No rashes or lesions    LABS:                        10.0   7.35  )-----------( 216      ( 04 May 2018 07:23 )             33.2     05-04    139  |  104  |  19  ----------------------------<  90  4.0   |  28  |  0.73    Ca    9.1      04 May 2018 07:23          CAPILLARY BLOOD GLUCOSE          RADIOLOGY & ADDITIONAL TESTS:    Imaging Personally Reviewed:  [ X] YES  [ ] NO    Consultant(s) Notes Reviewed:  [ X] YES  [ ] NO    Care Discussed with Consultants/Other Providers [X ] YES  [ ] NO

## 2018-05-04 NOTE — OCCUPATIONAL THERAPY INITIAL EVALUATION ADULT - RANGE OF MOTION EXAMINATION, LOWER EXTREMITY
Left LE Active ROM was WNL  (within normal limits)/Left LE Passive ROM was WNL (within normal limits)/RLE not tested

## 2018-05-04 NOTE — PHYSICAL THERAPY INITIAL EVALUATION ADULT - PERTINENT HX OF CURRENT PROBLEM, REHAB EVAL
Patient came in for elective right TKR revision. Now POD 1. Appreciate NWB order to right lower limb.

## 2018-05-04 NOTE — OCCUPATIONAL THERAPY INITIAL EVALUATION ADULT - ADDITIONAL COMMENTS
Prior to admission, Pt was functioning in his roles, self sufficient, driving  & ambulating independently without any assistive devices. Presently , pt needs  assistance with Functional mobility and to complete  care tasks. The scale below depicts a picture of the pt's current level of functioning. Barthel Index: Feeding Score____10__, Bathing Score____0__, Grooming Score___5__, Dressing Score___5__, Bowel Score___5__, Bladder Score___5___, Toilet Score____5_, Transfer Score____5__, Mobility Score___0__, Stairs Score__0___, Total Score___40/100__.

## 2018-05-04 NOTE — PHYSICAL THERAPY INITIAL EVALUATION ADULT - PLANNED THERAPY INTERVENTIONS, PT EVAL
neuromuscular re-education/ROM/transfer training/stretching/gait training/joint mobilization/strengthening/bed mobility training/balance training

## 2018-05-04 NOTE — CONSULT NOTE ADULT - SUBJECTIVE AND OBJECTIVE BOX
61yo Man with PMH as below, s/p Revision of R TKA, removal of hardware and insertion of antibiotic spacer POD 1. Patient denies complaints, and pain well controlled at this time.     PAST MEDICAL & SURGICAL HISTORY:  PICC (peripherally inserted central catheter) in place: - Left Arm  Wound: right leg - wound care and IV antibiotics  Afib  BPH without urinary obstruction  HTN (hypertension)  S/P inguinal hernia repair: Right  Acute leg pain, left: s/p femur surgery - gisela  S/P knee replacement: bilateral      SOCHX:   tobacco,  -  alcohol    FMHX: FA/MO  - contributory       Recent Travel:    Immunizations:    Allergies    latex (Rash)  No Known Drug Allergies    Intolerances        MEDICATIONS  (STANDING):  ascorbic acid 500 milliGRAM(s) Oral two times a day  atorvastatin Oral Tab/Cap - Peds 10 milliGRAM(s) Oral daily  cefepime   IVPB 2000 milliGRAM(s) IV Intermittent every 12 hours  celecoxib 200 milliGRAM(s) Oral every 12 hours  docusate sodium 100 milliGRAM(s) Oral three times a day  folic acid 1 milliGRAM(s) Oral daily  metoprolol tartrate 50 milliGRAM(s) Oral two times a day  multivitamin 1 Tablet(s) Oral daily  pantoprazole    Tablet 40 milliGRAM(s) Oral daily  polyethylene glycol 3350 17 Gram(s) Oral daily  sodium chloride 0.9%. 1000 milliLiter(s) (110 mL/Hr) IV Continuous <Continuous>  tamsulosin 0.4 milliGRAM(s) Oral daily  vancomycin  IVPB 1000 milliGRAM(s) IV Intermittent every 12 hours        REVIEW OF SYSTEMS: IN PROGRESS  CONSTITUTIONAL: No fever, weight loss, or fatigue  EYES: No eye pain, visual disturbances, or discharge  ENMT:  No difficulty hearing, tinnitus, vertigo; No sinus or throat pain  NECK: No pain or stiffness  BREASTS: No pain, masses, or nipple discharge  RESPIRATORY: No cough, wheezing, chills or hemoptysis; No shortness of breath  CARDIOVASCULAR: No chest pain, palpitations, dizziness, or leg swelling  GASTROINTESTINAL: No abdominal or epigastric pain. No nausea, vomiting, or hematemesis; No diarrhea or constipation. No melena or hematochezia.  GENITOURINARY: No dysuria, frequency, hematuria, or incontinence  NEUROLOGICAL: No headaches, memory loss, loss of strength, numbness, or tremors  SKIN: No itching, burning, rashes, or lesions   LYMPH NODES: No enlarged glands  ENDOCRINE: No heat or cold intolerance; No hair loss    VITAL SIGNS:    T(C): 36.3 (05-04-18 @ 05:30), Max: 36.6 (05-03-18 @ 21:30)  T(F): 97.4 (05-04-18 @ 05:30), Max: 97.8 (05-03-18 @ 21:30)  HR: 64 (05-04-18 @ 05:30) (43 - 65)  BP: 100/59 (05-04-18 @ 05:30) (88/55 - 123/66)  RR: 17 (05-04-18 @ 05:30) (14 - 17)  SpO2: 97% (05-04-18 @ 05:30) (95% - 100%)    PHYSICAL EXAM: IN PROGRESS     GENERAL: NAD, well-groomed, well-developed  HEAD:  Atraumatic, Normocephalic  EYES: EOMI, PERRLA, conjunctiva and sclera clear  ENMT: No tonsillar erythema, exudates, or enlargement; Moist mucous membranes, Good dentition, No lesions  NECK: Supple, No JVD, Normal thyroid  NERVOUS SYSTEM:  Alert & Oriented  CHEST/LUNG: Clear bilaterally; No rales, rhonchi, wheezing bilaterally  HEART: Regular rate and rhythm; No murmurs, rubs, or gallops  ABDOMEN: Soft, Nontender, Nondistended; Bowel sounds present  EXTREMITIES:  2+ Peripheral Pulses, No clubbing, cyanosis, or edema  LYMPH: No lymphadenopathy noted  SKIN:     LABS:                         10.5   8.59  )-----------( 222      ( 03 May 2018 18:28 )             35.2                                                   Radiology:
HPI: Other Care Providers:  · Primary Care Provider	Dr. Arshad [phone]	  · Care Providers for Follow up (PCP/Outpatient Provider)	infectious disease- Dr. torres [phone]  Cardio- [phone]	    Chief Complaint/Reason for Visit/HPI:    Reason for Admission:  Reason for Admission	I have infection of my right knee	     History of Present Illness:  History of Present Illness		  61 yo male -s/p right knee replacement in 2013- did well but recently had cellulitis of right leg- developed infection requiring iv antibiotics and - developed a wound requiring wound care and infectious disease evaluations currently has wound of right leg, had IV antibiotics via picc line - scheduled for removal of right knee device for spacer and antibiotics until ready for revision of right knee arthroplasty.  PMH - afib htn, hyperlipidemia      PAST MEDICAL & SURGICAL HISTORY:  PICC (peripherally inserted central catheter) in place: - Left Arm  Wound: right leg - wound care and IV antibiotics  Afib  BPH without urinary obstruction  HTN (hypertension)  S/P inguinal hernia repair: Right  Acute leg pain, left: s/p femur surgery - gisela  S/P knee replacement: bilateral      REVIEW OF SYSTEMS:    CONSTITUTIONAL: No fever, weight loss, or fatigue  EYES: No eye pain, visual disturbances, or discharge  ENMT:  No difficulty hearing, tinnitus, vertigo; No sinus or throat pain  NECK: No pain or stiffness  BREASTS: No pain, masses, or nipple discharge  RESPIRATORY: No cough, wheezing, chills or hemoptysis; No shortness of breath  CARDIOVASCULAR: No chest pain, palpitations, dizziness, or leg swelling  GASTROINTESTINAL: No abdominal or epigastric pain. No nausea, vomiting, or hematemesis; No diarrhea or constipation. No melena or hematochezia.  GENITOURINARY: No dysuria, frequency, hematuria, or incontinence  NEUROLOGICAL: No headaches, memory loss, loss of strength, numbness, or tremors  SKIN: No itching, burning, rashes, or lesions   LYMPH NODES: No enlarged glands  ENDOCRINE: No heat or cold intolerance; No hair loss  MUSCULOSKELETAL: No joint pain or swelling; No muscle, back, or extremity pain  PSYCHIATRIC: No depression, anxiety, mood swings, or difficulty sleeping  HEME/LYMPH: No easy bruising, or bleeding gums  ALLERGY AND IMMUNOLOGIC: No hives or eczema      MEDICATIONS  (STANDING):  atorvastatin Oral Tab/Cap - Peds 10 milliGRAM(s) Oral daily  cefepime   IVPB 2000 milliGRAM(s) IV Intermittent every 12 hours  lactated ringers. 1000 milliLiter(s) (100 mL/Hr) IV Continuous <Continuous>  metoprolol tartrate 50 milliGRAM(s) Oral two times a day  tamsulosin 0.4 milliGRAM(s) Oral daily    MEDICATIONS  (PRN):  morphine  - Injectable 2 milliGRAM(s) IV Push every 10 minutes PRN Severe Pain      Allergies    latex (Rash)  No Known Drug Allergies    Intolerances        SOCIAL HISTORY:    FAMILY HISTORY:      Vital Signs Last 24 Hrs  T(C): 36 (03 May 2018 17:10), Max: 36.4 (03 May 2018 12:04)  T(F): 96.8 (03 May 2018 17:10), Max: 97.5 (03 May 2018 12:04)  HR: 55 (03 May 2018 18:55) (43 - 58)  BP: 99/59 (03 May 2018 18:55) (95/55 - 123/66)  BP(mean): --  RR: 17 (03 May 2018 18:55) (14 - 17)  SpO2: 99% (03 May 2018 18:55) (99% - 100%)    PHYSICAL EXAM:    GENERAL: NAD, well-groomed, well-developed  HEAD:  Atraumatic, Normocephalic  EYES: EOMI, PERRLA, conjunctiva and sclera clear  ENMT: No tonsillar erythema, exudates, or enlargement; Moist mucous membranes, Good dentition, No lesions  NECK: Supple, No JVD, Normal thyroid  NERVOUS SYSTEM:  Alert & Oriented X3, Good concentration; Motor Strength 5/5 B/L upper and lower extremities; DTRs 2+ intact and symmetric  CHEST/LUNG: Clear to percussion bilaterally; No rales, rhonchi, wheezing, or rubs  HEART: Regular rate and rhythm; No murmurs, rubs, or gallops  ABDOMEN: Soft, Nontender, Nondistended; Bowel sounds present  EXTREMITIES:  2+ Peripheral Pulses, No clubbing, cyanosis, or edema right knee bandage left knee scar     SKIN: No rashes or lesions      LABS:                        10.5   8.59  )-----------( 222      ( 03 May 2018 18:28 )             35.2                 RADIOLOGY & ADDITIONAL STUDIES:    < from: Xray Knee 1 or 2 Views, Right (05.03.18 @ 14:35) >    The patient is status post right total knee arthroplasty. The hardware is   intact.  There is a large joint effusion.    IMPRESSION:    Status post right total knee arthroplasty with right knee joint effusion.    < end of copied text >

## 2018-05-04 NOTE — DISCHARGE NOTE ADULT - CARE PROVIDER_API CALL
Salazar Alonzo), Orthopaedic Surgery  [address] [place] [address]  Phone: [phone]  Fax: [phone]    Alli Estevez), Plastic Surgery  [address] [place] [address]  Phone: [phone]  Fax: [phone]

## 2018-05-04 NOTE — DISCHARGE NOTE ADULT - CARE PROVIDERS DIRECT ADDRESSES
,DirectAddress_Unknown,[email or web address]

## 2018-05-04 NOTE — OCCUPATIONAL THERAPY INITIAL EVALUATION ADULT - LIVES WITH, PROFILE
in a private house with 1 small entry step and no  hand rails. All living amenities are located on one level. The bathroom has a stall shower, grab and comfort height toilet./spouse spouse/in a private house with1 small entry step and no hand rails. All living amenities are located on one level. The bathroom has a stall shower, grab and comfort height toilet.

## 2018-05-04 NOTE — PHYSICAL THERAPY INITIAL EVALUATION ADULT - RANGE OF MOTION EXAMINATION, REHAB EVAL
Left LE ROM was WFL (within functional limits)/Left UE ROM was WFL (within functional limits)/deficits as listed below/right knee immobilized at 0 degree

## 2018-05-04 NOTE — OCCUPATIONAL THERAPY INITIAL EVALUATION ADULT - SOCIAL CONCERNS
Pt voiced concerns about his recovery at home. Pt has the support of family members to assist him/her after discharge home post-operatively./Complex psychosocial needs/coping issues Pt voiced concerns about his recovery at home. Pt has the support of his wife to assist him after discharge home post-operatively./Complex psychosocial needs/coping issues

## 2018-05-04 NOTE — OCCUPATIONAL THERAPY INITIAL EVALUATION ADULT - ANTICIPATED DISCHARGE DISPOSITION, OT EVAL
Recommend home with 3-in-1 commode, rolling walker and OT referral to enable patient to safely perform ADL management and functional mobility.

## 2018-05-04 NOTE — PHYSICAL THERAPY INITIAL EVALUATION ADULT - TRANSFER SAFETY CONCERNS NOTED: SIT/STAND, REHAB EVAL
decreased safety awareness/losing balance/decreased weight-shifting ability/decreased balance during turns

## 2018-05-04 NOTE — DISCHARGE NOTE ADULT - HOSPITAL COURSE
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 Alonzo on 5/3/18. Risk and benefits of surgery were explained to the patient. The patient understood and agreed to proceed with surgery. Patient underwent the procedure with no intraoperative complications. Pt was brought in stable condition to the PACU. Once stable in PACU, pt was brought to the floor. During hospital stay pt was followed by Medicine and ID and Plastics, Pt had an uneventful hospital course. Pt is stable for discharge to Home with Home Care Services and PT with Home Infusion of IV antibiotics for 6 weeks

## 2018-05-04 NOTE — PHYSICAL THERAPY INITIAL EVALUATION ADULT - IMPAIRMENTS FOUND, PT EVAL
gait, locomotion, and balance/cognitive impairment/ROM/neuromotor development and sensory integration/muscle strength/integumentary integrity/poor safety awareness/sensory integrity

## 2018-05-04 NOTE — PROGRESS NOTE ADULT - SUBJECTIVE AND OBJECTIVE BOX
62yMale s/p R TKA explant&placement of abx spacer and abx beads POD#1. Pt seen and examined in NAD with Dr. Alonzo. Pain controlled. Pt denies any new complaints. Pt denies CP/SOB/N/V/D/numbness/tingling/bowel or bladder dysfunction.     PE:   RLE: dressing c/d/i. +ROM ankle/toes. Calf: soft, compressible and nontender. DP/PT 2+ NVI. +LISA + HV                          10.0   7.35  )-----------( 216      ( 04 May 2018 07:23 )             33.2       05-04    139  |  104  |  19  ----------------------------<  90  4.0   |  28  |  0.73    Ca    9.1      04 May 2018 07:23        A/P: 62yMale s/p R TKA explant&placement of abx spacer and abx beads POD#1  Case discussed with pt and pt own ID doctor (Dr. Jacobo torres) - pt has been planned to continue on daptomycin 500mg qd, Rocephin 2g daily IV for at least 4 weeks ( until 5/31/18) post op which will most likely extend into 6 weeks of IV antibiotic therapy. Pt to follow up with his own ID doctor next week.   Will continue to follow cultures   Pain controlled  PT: RLE: NWB - in dre brace locked in extension    DVT ppx: SCDs/ASA 325mg BID    Wound care, Isometric exercises, incentive spirometry   Discussed case with inhouse ID team (Dr. Raines) - will continue vancomycin and cefepime while pt is inhouse and pt will resume daptomycin/rocephin once he is discharged  Discharge: planning for home   All the above discussed and understood by pt

## 2018-05-05 LAB — VANCOMYCIN TROUGH SERPL-MCNC: 15.8 UG/ML — SIGNIFICANT CHANGE UP (ref 10–20)

## 2018-05-05 PROCEDURE — 99233 SBSQ HOSP IP/OBS HIGH 50: CPT

## 2018-05-05 RX ORDER — ONDANSETRON 8 MG/1
4 TABLET, FILM COATED ORAL EVERY 6 HOURS
Qty: 0 | Refills: 0 | Status: DISCONTINUED | OUTPATIENT
Start: 2018-05-05 | End: 2018-05-07

## 2018-05-05 RX ADMIN — CELECOXIB 200 MILLIGRAM(S): 200 CAPSULE ORAL at 05:57

## 2018-05-05 RX ADMIN — Medication 25 MILLIGRAM(S): at 06:02

## 2018-05-05 RX ADMIN — Medication 500 MILLIGRAM(S): at 05:57

## 2018-05-05 RX ADMIN — Medication 1 MILLIGRAM(S): at 12:24

## 2018-05-05 RX ADMIN — ATORVASTATIN CALCIUM 10 MILLIGRAM(S): 80 TABLET, FILM COATED ORAL at 12:24

## 2018-05-05 RX ADMIN — Medication 250 MILLIGRAM(S): at 14:55

## 2018-05-05 RX ADMIN — Medication 1 TABLET(S): at 12:24

## 2018-05-05 RX ADMIN — CEFEPIME 100 MILLIGRAM(S): 1 INJECTION, POWDER, FOR SOLUTION INTRAMUSCULAR; INTRAVENOUS at 21:21

## 2018-05-05 RX ADMIN — TAMSULOSIN HYDROCHLORIDE 0.4 MILLIGRAM(S): 0.4 CAPSULE ORAL at 12:24

## 2018-05-05 RX ADMIN — CEFEPIME 100 MILLIGRAM(S): 1 INJECTION, POWDER, FOR SOLUTION INTRAMUSCULAR; INTRAVENOUS at 10:27

## 2018-05-05 RX ADMIN — PANTOPRAZOLE SODIUM 40 MILLIGRAM(S): 20 TABLET, DELAYED RELEASE ORAL at 12:24

## 2018-05-05 RX ADMIN — CELECOXIB 200 MILLIGRAM(S): 200 CAPSULE ORAL at 17:11

## 2018-05-05 RX ADMIN — Medication 500 MILLIGRAM(S): at 17:12

## 2018-05-05 RX ADMIN — RIVAROXABAN 10 MILLIGRAM(S): KIT at 12:50

## 2018-05-05 RX ADMIN — Medication 250 MILLIGRAM(S): at 02:05

## 2018-05-05 RX ADMIN — CELECOXIB 200 MILLIGRAM(S): 200 CAPSULE ORAL at 06:21

## 2018-05-05 NOTE — PROGRESS NOTE ADULT - SUBJECTIVE AND OBJECTIVE BOX
Patient is a 62y old  Male who presents with a chief complaint of right knee infection caused by hardware (04 May 2018 15:37)      INTERVAL HPI/OVERNIGHT EVENTS: no acute events has been working with PT     MEDICATIONS  (STANDING):  ascorbic acid 500 milliGRAM(s) Oral two times a day  atorvastatin Oral Tab/Cap - Peds 10 milliGRAM(s) Oral daily  cefepime   IVPB 2000 milliGRAM(s) IV Intermittent every 12 hours  celecoxib 200 milliGRAM(s) Oral every 12 hours  docusate sodium 100 milliGRAM(s) Oral three times a day  folic acid 1 milliGRAM(s) Oral daily  metoprolol tartrate 25 milliGRAM(s) Oral two times a day  multivitamin 1 Tablet(s) Oral daily  pantoprazole    Tablet 40 milliGRAM(s) Oral daily  polyethylene glycol 3350 17 Gram(s) Oral daily  rivaroxaban 10 milliGRAM(s) Oral daily  sodium chloride 0.9%. 1000 milliLiter(s) (110 mL/Hr) IV Continuous <Continuous>  tamsulosin 0.4 milliGRAM(s) Oral daily  vancomycin  IVPB 1000 milliGRAM(s) IV Intermittent every 12 hours    MEDICATIONS  (PRN):  acetaminophen   Tablet 650 milliGRAM(s) Oral every 6 hours PRN For Temp over 38.3 C (100.94 F)  aluminum hydroxide/magnesium hydroxide/simethicone Suspension 30 milliLiter(s) Oral four times a day PRN Indigestion  bisacodyl Suppository 10 milliGRAM(s) Rectal daily PRN If no bowel movement by postoperative day #2  HYDROmorphone  Injectable 1 milliGRAM(s) IV Push every 4 hours PRN breakthrough  magnesium hydroxide Suspension 30 milliLiter(s) Oral daily PRN Constipation  ondansetron Injectable 4 milliGRAM(s) IV Push every 6 hours PRN Nausea and/or Vomiting  oxyCODONE    IR 5 milliGRAM(s) Oral every 4 hours PRN Pain 1 - 5  oxyCODONE    IR 10 milliGRAM(s) Oral every 4 hours PRN Pain 6 - 10  senna 2 Tablet(s) Oral at bedtime PRN Constipation      Allergies    latex (Rash)  No Known Drug Allergies    Intolerances        REVIEW OF SYSTEMS:  CONSTITUTIONAL: No fever, weight loss, or fatigue  EYES: No eye pain, visual disturbances, or discharge  ENMT:  No difficulty hearing, tinnitus, vertigo; No sinus or throat pain  NECK: No pain or stiffness  RESPIRATORY: No cough, wheezing, chills or hemoptysis; No shortness of breath  CARDIOVASCULAR: No chest pain, palpitations, dizziness, or leg swelling  GASTROINTESTINAL: No abdominal or epigastric pain. No nausea, vomiting, or hematemesis; No diarrhea or constipation. No melena or hematochezia.  GENITOURINARY: No dysuria, frequency, hematuria, or incontinence  NEUROLOGICAL: No headaches, memory loss, loss of strength, numbness, or tremors  SKIN: No itching, burning, rashes, or lesions   LYMPH NODES: No enlarged glands  ENDOCRINE: No heat or cold intolerance; No hair loss  MUSCULOSKELETAL: right knee pain tolerable   PSYCHIATRIC: No depression, anxiety, mood swings, or difficulty sleeping  HEME/LYMPH: No easy bruising, or bleeding gums  ALLERGY AND IMMUNOLOGIC: No hives or eczema    Vital Signs Last 24 Hrs  T(C): 36.5 (05 May 2018 12:08), Max: 36.7 (05 May 2018 05:56)  T(F): 97.7 (05 May 2018 12:08), Max: 98 (05 May 2018 05:56)  HR: 91 (05 May 2018 12:08) (68 - 91)  BP: 99/56 (05 May 2018 12:08) (99/56 - 114/73)  BP(mean): --  RR: 16 (05 May 2018 12:08) (16 - 18)  SpO2: 98% (05 May 2018 12:08) (96% - 98%)    PHYSICAL EXAM:  GENERAL: NAD, well-groomed, well-developed  HEAD:  Atraumatic, Normocephalic  EYES: EOMI, PERRLA, conjunctiva and sclera clear  ENMT: No tonsillar erythema, exudates, or enlargement; Moist mucous membranes, Good dentition, No lesions  NECK: Supple, No JVD, Normal thyroid  NERVOUS SYSTEM:  Alert & Oriented X3, Good concentration; Motor Strength 5/5 B/L upper and lower extremities; DTRs 2+ intact and symmetric  CHEST/LUNG: Clear to percussion bilaterally; No rales, rhonchi, wheezing, or rubs  HEART: Regular rate and rhythm; No murmurs, rubs, or gallops  ABDOMEN: Soft, Nontender, Nondistended; Bowel sounds present  EXTREMITIES:  right knee swollen in brace dressed with hemovac left arm PICC   LYMPH: No lymphadenopathy noted  SKIN: No rashes or lesions    LABS:                        10.0   7.35  )-----------( 216      ( 04 May 2018 07:23 )             33.2     05-04    139  |  104  |  19  ----------------------------<  90  4.0   |  28  |  0.73    Ca    9.1      04 May 2018 07:23          CAPILLARY BLOOD GLUCOSE          RADIOLOGY & ADDITIONAL TESTS:    Imaging Personally Reviewed:  [X ] YES  [ ] NO    Consultant(s) Notes Reviewed:  [X ] YES  [ ] NO    Care Discussed with Consultants/Other Providers [ X] YES  [ ] NO

## 2018-05-05 NOTE — PROGRESS NOTE ADULT - SUBJECTIVE AND OBJECTIVE BOX
62yMale s/p R TKA removal of hardware/placement of abx spacer POD#2. Pt seen and examined in NAD. Pain controlled. Pt denies any new complaints. Pt denies CP/SOB/N/V/D/numbness/tingling/bowel or bladder dysfunction.     PE:   RLE: dressing c/d/i. +ROM ankle/toes. Calf: soft, compressible and nontender. DP/PT 2+ NVI.                           10.0   7.35  )-----------( 216      ( 04 May 2018 07:23 )             33.2       05-04    139  |  104  |  19  ----------------------------<  90  4.0   |  28  |  0.73    Ca    9.1      04 May 2018 07:23          A/P: 62yMale s/p R TKA removal of hardware/placement of abx spacer POD#2.   Dressing changed - mepelix applied by plastics team  Continue to monitor LISA/HV q8H - as per plastics team LISA drain to remain until follow up with plastics outpatient   Pain controlled  PT: NWB - in dre brace locked in extension -  may remove brace to check skin q8H to prevent skin break down    DVT ppx: SCDs/Xarelto   ID: Pt to continue daptomycin/rocephin outpatient - pt to follow up with his community infectious disease doctor  Wound care, Isometric exercises, incentive spirometry   Discharge: planning for home with IV abx via PICC Line   All the above discussed and understood by pt

## 2018-05-06 LAB
ANION GAP SERPL CALC-SCNC: 9 MMOL/L — SIGNIFICANT CHANGE UP (ref 5–17)
BUN SERPL-MCNC: 17 MG/DL — SIGNIFICANT CHANGE UP (ref 7–23)
CALCIUM SERPL-MCNC: 9.6 MG/DL — SIGNIFICANT CHANGE UP (ref 8.5–10.1)
CHLORIDE SERPL-SCNC: 106 MMOL/L — SIGNIFICANT CHANGE UP (ref 96–108)
CO2 SERPL-SCNC: 26 MMOL/L — SIGNIFICANT CHANGE UP (ref 22–31)
CREAT SERPL-MCNC: 0.99 MG/DL — SIGNIFICANT CHANGE UP (ref 0.5–1.3)
GLUCOSE SERPL-MCNC: 112 MG/DL — HIGH (ref 70–99)
HCT VFR BLD CALC: 36.6 % — LOW (ref 39–50)
HGB BLD-MCNC: 11 G/DL — LOW (ref 13–17)
MCHC RBC-ENTMCNC: 24.7 PG — LOW (ref 27–34)
MCHC RBC-ENTMCNC: 30.1 GM/DL — LOW (ref 32–36)
MCV RBC AUTO: 82.1 FL — SIGNIFICANT CHANGE UP (ref 80–100)
NRBC # BLD: 0 /100 WBCS — SIGNIFICANT CHANGE UP (ref 0–0)
PLATELET # BLD AUTO: 190 K/UL — SIGNIFICANT CHANGE UP (ref 150–400)
POTASSIUM SERPL-MCNC: 3.7 MMOL/L — SIGNIFICANT CHANGE UP (ref 3.5–5.3)
POTASSIUM SERPL-SCNC: 3.7 MMOL/L — SIGNIFICANT CHANGE UP (ref 3.5–5.3)
RBC # BLD: 4.46 M/UL — SIGNIFICANT CHANGE UP (ref 4.2–5.8)
RBC # FLD: 19.1 % — HIGH (ref 10.3–14.5)
SODIUM SERPL-SCNC: 141 MMOL/L — SIGNIFICANT CHANGE UP (ref 135–145)
WBC # BLD: 6.16 K/UL — SIGNIFICANT CHANGE UP (ref 3.8–10.5)
WBC # FLD AUTO: 6.16 K/UL — SIGNIFICANT CHANGE UP (ref 3.8–10.5)

## 2018-05-06 PROCEDURE — 99233 SBSQ HOSP IP/OBS HIGH 50: CPT

## 2018-05-06 RX ADMIN — RIVAROXABAN 10 MILLIGRAM(S): KIT at 11:54

## 2018-05-06 RX ADMIN — Medication 25 MILLIGRAM(S): at 05:51

## 2018-05-06 RX ADMIN — CEFEPIME 100 MILLIGRAM(S): 1 INJECTION, POWDER, FOR SOLUTION INTRAMUSCULAR; INTRAVENOUS at 22:58

## 2018-05-06 RX ADMIN — ATORVASTATIN CALCIUM 10 MILLIGRAM(S): 80 TABLET, FILM COATED ORAL at 11:45

## 2018-05-06 RX ADMIN — Medication 250 MILLIGRAM(S): at 17:29

## 2018-05-06 RX ADMIN — Medication 250 MILLIGRAM(S): at 01:26

## 2018-05-06 RX ADMIN — Medication 500 MILLIGRAM(S): at 17:30

## 2018-05-06 RX ADMIN — CELECOXIB 200 MILLIGRAM(S): 200 CAPSULE ORAL at 17:27

## 2018-05-06 RX ADMIN — CELECOXIB 200 MILLIGRAM(S): 200 CAPSULE ORAL at 05:51

## 2018-05-06 RX ADMIN — Medication 1 MILLIGRAM(S): at 11:45

## 2018-05-06 RX ADMIN — Medication 500 MILLIGRAM(S): at 05:51

## 2018-05-06 RX ADMIN — CEFEPIME 100 MILLIGRAM(S): 1 INJECTION, POWDER, FOR SOLUTION INTRAMUSCULAR; INTRAVENOUS at 11:46

## 2018-05-06 RX ADMIN — CELECOXIB 200 MILLIGRAM(S): 200 CAPSULE ORAL at 06:05

## 2018-05-06 RX ADMIN — Medication 1 TABLET(S): at 11:45

## 2018-05-06 RX ADMIN — PANTOPRAZOLE SODIUM 40 MILLIGRAM(S): 20 TABLET, DELAYED RELEASE ORAL at 11:47

## 2018-05-06 RX ADMIN — TAMSULOSIN HYDROCHLORIDE 0.4 MILLIGRAM(S): 0.4 CAPSULE ORAL at 11:54

## 2018-05-06 RX ADMIN — Medication 25 MILLIGRAM(S): at 17:28

## 2018-05-06 NOTE — PROGRESS NOTE ADULT - SUBJECTIVE AND OBJECTIVE BOX
Patient is a 62y old  Male who presents with a chief complaint of right knee infection caused by hardware (04 May 2018 15:37)      INTERVAL HPI/OVERNIGHT EVENTS: no acute events overnight  discharge plans changed     MEDICATIONS  (STANDING):  ascorbic acid 500 milliGRAM(s) Oral two times a day  atorvastatin Oral Tab/Cap - Peds 10 milliGRAM(s) Oral daily  cefepime   IVPB 2000 milliGRAM(s) IV Intermittent every 12 hours  celecoxib 200 milliGRAM(s) Oral every 12 hours  docusate sodium 100 milliGRAM(s) Oral three times a day  folic acid 1 milliGRAM(s) Oral daily  metoprolol tartrate 25 milliGRAM(s) Oral two times a day  multivitamin 1 Tablet(s) Oral daily  pantoprazole    Tablet 40 milliGRAM(s) Oral daily  polyethylene glycol 3350 17 Gram(s) Oral daily  rivaroxaban 10 milliGRAM(s) Oral daily  sodium chloride 0.9%. 1000 milliLiter(s) (110 mL/Hr) IV Continuous <Continuous>  tamsulosin 0.4 milliGRAM(s) Oral daily  vancomycin  IVPB 1000 milliGRAM(s) IV Intermittent every 12 hours    MEDICATIONS  (PRN):  acetaminophen   Tablet 650 milliGRAM(s) Oral every 6 hours PRN For Temp over 38.3 C (100.94 F)  aluminum hydroxide/magnesium hydroxide/simethicone Suspension 30 milliLiter(s) Oral four times a day PRN Indigestion  bisacodyl Suppository 10 milliGRAM(s) Rectal daily PRN If no bowel movement by postoperative day #2  HYDROmorphone  Injectable 1 milliGRAM(s) IV Push every 4 hours PRN breakthrough  magnesium hydroxide Suspension 30 milliLiter(s) Oral daily PRN Constipation  ondansetron   Disintegrating Tablet 4 milliGRAM(s) Oral every 6 hours PRN Nausea and/or Vomiting  oxyCODONE    IR 5 milliGRAM(s) Oral every 4 hours PRN Pain 1 - 5  oxyCODONE    IR 10 milliGRAM(s) Oral every 4 hours PRN Pain 6 - 10  senna 2 Tablet(s) Oral at bedtime PRN Constipation      Allergies    latex (Rash)  No Known Drug Allergies    Intolerances        REVIEW OF SYSTEMS:  CONSTITUTIONAL: No fever, weight loss, or fatigue  EYES: No eye pain, visual disturbances, or discharge  ENMT:  No difficulty hearing, tinnitus, vertigo; No sinus or throat pain  NECK: No pain or stiffness  BREASTS: No pain, masses, or nipple discharge  RESPIRATORY: No cough, wheezing, chills or hemoptysis; No shortness of breath  CARDIOVASCULAR: No chest pain, palpitations, dizziness, or leg swelling  GASTROINTESTINAL: No abdominal or epigastric pain. No nausea, vomiting, or hematemesis; No diarrhea or constipation. No melena or hematochezia.  GENITOURINARY: No dysuria, frequency, hematuria, or incontinence  NEUROLOGICAL: No headaches, memory loss, loss of strength, numbness, or tremors  SKIN: No itching, burning, rashes, or lesions   LYMPH NODES: No enlarged glands  ENDOCRINE: No heat or cold intolerance; No hair loss  MUSCULOSKELETAL: right knee discomfort tolerable   PSYCHIATRIC: No depression, anxiety, mood swings, or difficulty sleeping  HEME/LYMPH: No easy bruising, or bleeding gums  ALLERGY AND IMMUNOLOGIC: No hives or eczema    Vital Signs Last 24 Hrs  T(C): 36.6 (06 May 2018 11:59), Max: 36.8 (05 May 2018 18:16)  T(F): 97.8 (06 May 2018 11:59), Max: 98.3 (05 May 2018 18:16)  HR: 96 (06 May 2018 15:19) (93 - 104)  BP: 97/61 (06 May 2018 11:59) (91/60 - 124/76)  BP(mean): --  RR: 16 (06 May 2018 11:59) (15 - 18)  SpO2: 97% (06 May 2018 15:19) (95% - 98%)    PHYSICAL EXAM:  GENERAL: NAD, well-groomed, well-developed  HEAD:  Atraumatic, Normocephalic  EYES: EOMI, PERRLA, conjunctiva and sclera clear  ENMT: No tonsillar erythema, exudates, or enlargement; Moist mucous membranes, Good dentition, No lesions  NECK: Supple, No JVD, Normal thyroid  NERVOUS SYSTEM:  Alert & Oriented X3, Good concentration; Motor Strength 5/5 B/L upper and lower extremities; DTRs 2+ intact and symmetric  CHEST/LUNG: Clear to percussion bilaterally; No rales, rhonchi, wheezing, or rubs  HEART: Regular rate and rhythm; No murmurs, rubs, or gallops  ABDOMEN: Soft, Nontender, Nondistended; Bowel sounds present  EXTREMITIES: right knee with hemovac and brace   LYMPH: No lymphadenopathy noted  SKIN: No rashes or lesions    LABS:                        11.0   6.16  )-----------( 190      ( 06 May 2018 06:48 )             36.6     05-06    141  |  106  |  17  ----------------------------<  112<H>  3.7   |  26  |  0.99    Ca    9.6      06 May 2018 06:43          CAPILLARY BLOOD GLUCOSE          RADIOLOGY & ADDITIONAL TESTS:    Imaging Personally Reviewed:  [ X] YES  [ ] NO    Consultant(s) Notes Reviewed:  [X ] YES  [ ] NO    Care Discussed with Consultants/Other Providers [ X] YES  [ ] NO

## 2018-05-06 NOTE — PROGRESS NOTE ADULT - SUBJECTIVE AND OBJECTIVE BOX
INTERVAL HPI/OVERNIGHT EVENTS:    Patient sitting comfortably.  Min postop pain.  Tolerating diet with no complaint of nausea/vomiting. Voiding.  +BM.        Vital Signs Last 24 Hrs  T(C): 36.6 (06 May 2018 11:59), Max: 36.8 (05 May 2018 18:16)  T(F): 97.8 (06 May 2018 11:59), Max: 98.3 (05 May 2018 18:16)  HR: 103 (06 May 2018 11:59) (93 - 104)  BP: 97/61 (06 May 2018 11:59) (91/60 - 124/76)  BP(mean): --  RR: 16 (06 May 2018 11:59) (15 - 18)  SpO2: 97% (06 May 2018 11:59) (95% - 98%)    MEDICATIONS  (STANDING):  ascorbic acid 500 milliGRAM(s) Oral two times a day  atorvastatin Oral Tab/Cap - Peds 10 milliGRAM(s) Oral daily  cefepime   IVPB 2000 milliGRAM(s) IV Intermittent every 12 hours  celecoxib 200 milliGRAM(s) Oral every 12 hours  docusate sodium 100 milliGRAM(s) Oral three times a day  folic acid 1 milliGRAM(s) Oral daily  metoprolol tartrate 25 milliGRAM(s) Oral two times a day  multivitamin 1 Tablet(s) Oral daily  pantoprazole    Tablet 40 milliGRAM(s) Oral daily  polyethylene glycol 3350 17 Gram(s) Oral daily  rivaroxaban 10 milliGRAM(s) Oral daily  sodium chloride 0.9%. 1000 milliLiter(s) (110 mL/Hr) IV Continuous <Continuous>  tamsulosin 0.4 milliGRAM(s) Oral daily  vancomycin  IVPB 1000 milliGRAM(s) IV Intermittent every 12 hours    MEDICATIONS  (PRN):  acetaminophen   Tablet 650 milliGRAM(s) Oral every 6 hours PRN For Temp over 38.3 C (100.94 F)  aluminum hydroxide/magnesium hydroxide/simethicone Suspension 30 milliLiter(s) Oral four times a day PRN Indigestion  bisacodyl Suppository 10 milliGRAM(s) Rectal daily PRN If no bowel movement by postoperative day #2  HYDROmorphone  Injectable 1 milliGRAM(s) IV Push every 4 hours PRN breakthrough  magnesium hydroxide Suspension 30 milliLiter(s) Oral daily PRN Constipation  ondansetron   Disintegrating Tablet 4 milliGRAM(s) Oral every 6 hours PRN Nausea and/or Vomiting  oxyCODONE    IR 5 milliGRAM(s) Oral every 4 hours PRN Pain 1 - 5  oxyCODONE    IR 10 milliGRAM(s) Oral every 4 hours PRN Pain 6 - 10  senna 2 Tablet(s) Oral at bedtime PRN Constipation      PHYSICAL EXAM:  RLE -- Hemovac intact with 85 cc serosanguinous discharge,   LISA drain with 20 cc serosanguinous discharge,   RLE: dressing c/d/i. +ROM ankle/toes. Calf: soft, compressible and nontender. DP/PT 2+ NVI.       I&O's Detail    05 May 2018 07:01  -  06 May 2018 07:00  --------------------------------------------------------  IN:    Oral Fluid: 1175 mL    Solution: 50 mL    Solution: 250 mL  Total IN: 1475 mL    OUT:    Accordian: 85 mL    Bulb: 20 mL    Voided: 900 mL  Total OUT: 1005 mL    Total NET: 470 mL          LABS:                        11.0   6.16  )-----------( 190      ( 06 May 2018 06:48 )             36.6     05-06    141  |  106  |  17  ----------------------------<  112<H>  3.7   |  26  |  0.99    Ca    9.6      06 May 2018 06:43          Culture Results:   No growth to date. (05-04 @ 01:13)  Culture Results:   No growth to date. (05-04 @ 01:13)  Culture Results:   No growth to date. (05-04 @ 01:13)  Culture Results:   No growth to date. (05-04 @ 01:13)      Impression:    62yMale s/p R TKA removal of hardware/placement of abx spacer POD#3       Plan:  As discussed with patient and SW - DC planning tomm for better communication with IV infusion team outpatient and poss DC hemovac drain.  Continue to monitor LISA/HV q8H - as per plastics team LISA drain to remain until follow up with plastics outpatient   Pain controlled  PT: NWB - in dre brace locked in extension -  may remove brace to check skin q8H to prevent skin break down    DVT ppx: SCDs/Xarelto   ID: Pt to continue daptomycin/rocephin outpatient - pt to follow up with his community infectious disease doctor  Wound care, Isometric exercises, incentive spirometry   Discharge: planning for home with IV abx via PICC Line   All the above discussed and understood by pt

## 2018-05-07 VITALS
OXYGEN SATURATION: 100 % | RESPIRATION RATE: 18 BRPM | TEMPERATURE: 96 F | SYSTOLIC BLOOD PRESSURE: 126 MMHG | DIASTOLIC BLOOD PRESSURE: 76 MMHG | HEART RATE: 75 BPM

## 2018-05-07 DIAGNOSIS — I10 ESSENTIAL (PRIMARY) HYPERTENSION: ICD-10-CM

## 2018-05-07 DIAGNOSIS — N40.0 BENIGN PROSTATIC HYPERPLASIA WITHOUT LOWER URINARY TRACT SYMPTOMS: ICD-10-CM

## 2018-05-07 DIAGNOSIS — I48.91 UNSPECIFIED ATRIAL FIBRILLATION: ICD-10-CM

## 2018-05-07 DIAGNOSIS — Z96.651 PRESENCE OF RIGHT ARTIFICIAL KNEE JOINT: ICD-10-CM

## 2018-05-07 DIAGNOSIS — I48.0 PAROXYSMAL ATRIAL FIBRILLATION: ICD-10-CM

## 2018-05-07 DIAGNOSIS — T14.90XA INJURY, UNSPECIFIED, INITIAL ENCOUNTER: ICD-10-CM

## 2018-05-07 DIAGNOSIS — Z45.2 ENCOUNTER FOR ADJUSTMENT AND MANAGEMENT OF VASCULAR ACCESS DEVICE: ICD-10-CM

## 2018-05-07 LAB — SURGICAL PATHOLOGY FINAL REPORT - CH: SIGNIFICANT CHANGE UP

## 2018-05-07 PROCEDURE — 99233 SBSQ HOSP IP/OBS HIGH 50: CPT

## 2018-05-07 PROCEDURE — 73560 X-RAY EXAM OF KNEE 1 OR 2: CPT | Mod: 26,RT

## 2018-05-07 RX ORDER — ASCORBIC ACID 60 MG
1 TABLET,CHEWABLE ORAL
Qty: 0 | Refills: 0 | COMMUNITY
Start: 2018-05-07

## 2018-05-07 RX ORDER — CEFTRIAXONE 500 MG/1
2 INJECTION, POWDER, FOR SOLUTION INTRAMUSCULAR; INTRAVENOUS
Qty: 0 | Refills: 0 | COMMUNITY
Start: 2018-05-07

## 2018-05-07 RX ORDER — ASPIRIN/CALCIUM CARB/MAGNESIUM 324 MG
1 TABLET ORAL
Qty: 0 | Refills: 0 | COMMUNITY

## 2018-05-07 RX ORDER — PANTOPRAZOLE SODIUM 20 MG/1
1 TABLET, DELAYED RELEASE ORAL
Qty: 30 | Refills: 0 | OUTPATIENT
Start: 2018-05-07 | End: 2018-06-05

## 2018-05-07 RX ORDER — DAPTOMYCIN 500 MG/10ML
1 INJECTION, POWDER, LYOPHILIZED, FOR SOLUTION INTRAVENOUS
Qty: 0 | Refills: 0 | COMMUNITY

## 2018-05-07 RX ORDER — OXYCODONE HYDROCHLORIDE 5 MG/1
1 TABLET ORAL
Qty: 42 | Refills: 0 | OUTPATIENT
Start: 2018-05-07 | End: 2018-05-13

## 2018-05-07 RX ORDER — CELECOXIB 200 MG/1
1 CAPSULE ORAL
Qty: 60 | Refills: 0 | OUTPATIENT
Start: 2018-05-07 | End: 2018-06-05

## 2018-05-07 RX ORDER — FOLIC ACID 0.8 MG
1 TABLET ORAL
Qty: 0 | Refills: 0 | COMMUNITY
Start: 2018-05-07

## 2018-05-07 RX ORDER — CEFTRIAXONE 500 MG/1
1 INJECTION, POWDER, FOR SOLUTION INTRAMUSCULAR; INTRAVENOUS
Qty: 0 | Refills: 0 | COMMUNITY

## 2018-05-07 RX ORDER — RIVAROXABAN 15 MG-20MG
1 KIT ORAL
Qty: 30 | Refills: 0 | OUTPATIENT
Start: 2018-05-07 | End: 2018-06-05

## 2018-05-07 RX ORDER — ACETAMINOPHEN 500 MG
2 TABLET ORAL
Qty: 0 | Refills: 0 | COMMUNITY
Start: 2018-05-07

## 2018-05-07 RX ORDER — DOCUSATE SODIUM 100 MG
1 CAPSULE ORAL
Qty: 0 | Refills: 0 | COMMUNITY
Start: 2018-05-07

## 2018-05-07 RX ORDER — DAPTOMYCIN 500 MG/10ML
500 INJECTION, POWDER, LYOPHILIZED, FOR SOLUTION INTRAVENOUS
Qty: 0 | Refills: 0 | COMMUNITY
Start: 2018-05-07

## 2018-05-07 RX ORDER — CEFTRIAXONE 500 MG/1
2 INJECTION, POWDER, FOR SOLUTION INTRAMUSCULAR; INTRAVENOUS EVERY 24 HOURS
Qty: 0 | Refills: 0 | Status: DISCONTINUED | OUTPATIENT
Start: 2018-05-07 | End: 2018-05-07

## 2018-05-07 RX ORDER — DAPTOMYCIN 500 MG/10ML
500 INJECTION, POWDER, LYOPHILIZED, FOR SOLUTION INTRAVENOUS EVERY 24 HOURS
Qty: 0 | Refills: 0 | Status: DISCONTINUED | OUTPATIENT
Start: 2018-05-07 | End: 2018-05-07

## 2018-05-07 RX ADMIN — CELECOXIB 200 MILLIGRAM(S): 200 CAPSULE ORAL at 05:49

## 2018-05-07 RX ADMIN — Medication 1 MILLIGRAM(S): at 11:39

## 2018-05-07 RX ADMIN — PANTOPRAZOLE SODIUM 40 MILLIGRAM(S): 20 TABLET, DELAYED RELEASE ORAL at 11:40

## 2018-05-07 RX ADMIN — Medication 25 MILLIGRAM(S): at 05:49

## 2018-05-07 RX ADMIN — Medication 250 MILLIGRAM(S): at 02:10

## 2018-05-07 RX ADMIN — ATORVASTATIN CALCIUM 10 MILLIGRAM(S): 80 TABLET, FILM COATED ORAL at 11:40

## 2018-05-07 RX ADMIN — TAMSULOSIN HYDROCHLORIDE 0.4 MILLIGRAM(S): 0.4 CAPSULE ORAL at 11:40

## 2018-05-07 RX ADMIN — Medication 100 MILLIGRAM(S): at 05:49

## 2018-05-07 RX ADMIN — Medication 1 TABLET(S): at 11:40

## 2018-05-07 RX ADMIN — CEFEPIME 100 MILLIGRAM(S): 1 INJECTION, POWDER, FOR SOLUTION INTRAMUSCULAR; INTRAVENOUS at 10:32

## 2018-05-07 RX ADMIN — Medication 500 MILLIGRAM(S): at 05:49

## 2018-05-07 RX ADMIN — RIVAROXABAN 10 MILLIGRAM(S): KIT at 11:40

## 2018-05-07 NOTE — PROGRESS NOTE ADULT - SUBJECTIVE AND OBJECTIVE BOX
POD#4 s/p Right TKA Explant I&D and Placement of Antibiotic Spacer   62yMale Patient seen and examined, Pain controlled  Patient Denies SOB, CP, N/V/D       PE: Right Knee/LE: Dressing C/D/I, HV and LISA intact, Sensation/motor intact, DP 2+, FROM ankle/toes, Frio Intact, Calf Soft Non Tender                             11.0   6.16  )-----------( 190      ( 06 May 2018 06:48 )             36.6       05-06    141  |  106  |  17  ----------------------------<  112<H>  3.7   |  26  |  0.99    Ca    9.6      06 May 2018 06:43          A: As above   P: Pain Control       DVT Prophylaxis      Incentive spirometry      Continue care as per Plastics/ID      Monitor HV/LISA Output       PT WBAT with Frio Brace       Continue IV Antibiotics       Isometric exercises      Discharge Planning      All the above discussed and understood by pt       Ortho to F/U

## 2018-05-07 NOTE — PROGRESS NOTE ADULT - ASSESSMENT
[age] y/o male POD 0 s/p removal of right knee hardware ad spacer placement. Patient encountered in PACU bradycardiac while sleeping but improved while awake   Patient with history of paroxsymal Afib low CRISTOFER score only on ASA  Standard ortho protocol   PT   DVT prophylaxis with Xarelto    will follow up with  out patient ID on Wednesday   appreciate in house ID consult     concern for setting up home care and coordinating with wound and antibiotics
[age] y/o male POD 0 s/p removal of right knee hardware ad spacer placement. Patient encountered in PACU bradycardiac while sleeping but improved while awake   Patient with history of paroxsymal afib low CRISTOFER score only on ASA  Standard ortho protocol   PT   DVT prophylaxis with Xarelto    will follow up with  out patient ID on wednesday   appreciate in house ID consult
[age] y/o male POD 0 s/p removal of right knee hardware ad spacer placement. Patient encountered in PACU bradycardiac while sleeping but improved while awake   would resume metoprolol in am   Patient with history of paroxsymal afib low CRISTOFER score only on ASA  Standard ortho protocol   PT   DVT prophylaxis    will follow up with  out patient ID on wednesday   appreciate in house ID consult
62yMale s/p R TKA explant&placement of abx spacer and abx beads POD#1
R TKA removal of hardware/placement of abx spacer POD#2.
63 y/o male POD 0 s/p removal of right knee hardware ad spacer placement. Patient followed by medicine due to bradycardiac while sleeping but improved while awake. Patient with history of paroxsymal Afib low CRISTOFER score only on ASA
infected prosthetic knee   no culture data   has been on empiric rocephin and dapto per his own infectiuos disease doctor  patient has an apt wednesday with her and prefers to follow up with her   i spoke to micro as culture need to be kept longer as patient was on antibiotics   they will keep additional 2 weeks   patient informed re side effects of dapto and that he can not be on any statins while on dapto   also informed about signs and symptoms of eosinophillic pneumonia   we recommend close follow up in our office/or infectiuos disease physician of ur choice  mario as sometimes therapy has to be modified mario as not all culture data may be finalised before discharge  patient wants to continue with dr torres

## 2018-05-07 NOTE — PROGRESS NOTE ADULT - SUBJECTIVE AND OBJECTIVE BOX
HPI:  63 yo male -s/p right knee replacement in 2013- did well but recently had cellulitis of right leg- developed infection requiring iv antibiotics and - developed a wound requiring wound care and infectious disease evaluations currently has wound of right leg, had IV antibiotics via picc line - scheduled for removal of right knee device for spacer and antibiotics until ready for revision of right knee arthroplasty     Allergies    latex (Rash)  No Known Drug Allergies    Intolerances        MEDICATIONS  (STANDING):  ascorbic acid 500 milliGRAM(s) Oral two times a day  atorvastatin Oral Tab/Cap - Peds 10 milliGRAM(s) Oral daily  cefepime   IVPB 2000 milliGRAM(s) IV Intermittent every 12 hours  celecoxib 200 milliGRAM(s) Oral every 12 hours  docusate sodium 100 milliGRAM(s) Oral three times a day  folic acid 1 milliGRAM(s) Oral daily  metoprolol tartrate 25 milliGRAM(s) Oral two times a day  multivitamin 1 Tablet(s) Oral daily  pantoprazole    Tablet 40 milliGRAM(s) Oral daily  polyethylene glycol 3350 17 Gram(s) Oral daily  rivaroxaban 10 milliGRAM(s) Oral daily  sodium chloride 0.9%. 1000 milliLiter(s) (110 mL/Hr) IV Continuous <Continuous>  tamsulosin 0.4 milliGRAM(s) Oral daily  vancomycin  IVPB 1000 milliGRAM(s) IV Intermittent every 12 hours    MEDICATIONS  (PRN):  acetaminophen   Tablet 650 milliGRAM(s) Oral every 6 hours PRN For Temp over 38.3 C (100.94 F)  aluminum hydroxide/magnesium hydroxide/simethicone Suspension 30 milliLiter(s) Oral four times a day PRN Indigestion  bisacodyl Suppository 10 milliGRAM(s) Rectal daily PRN If no bowel movement by postoperative day #2  HYDROmorphone  Injectable 1 milliGRAM(s) IV Push every 4 hours PRN breakthrough  magnesium hydroxide Suspension 30 milliLiter(s) Oral daily PRN Constipation  ondansetron   Disintegrating Tablet 4 milliGRAM(s) Oral every 6 hours PRN Nausea and/or Vomiting  oxyCODONE    IR 5 milliGRAM(s) Oral every 4 hours PRN Pain 1 - 5  oxyCODONE    IR 10 milliGRAM(s) Oral every 4 hours PRN Pain 6 - 10  senna 2 Tablet(s) Oral at bedtime PRN Constipation      REVIEW OF SYSTEMS:    CONSTITUTIONAL: No fever, chills, weight loss, or fatigue  HEENT: No sore throat, runny nose, ear ache  RESPIRATORY: No cough, wheezing, No shortness of breath  CARDIOVASCULAR: No chest pain, palpitations, dizziness  GASTROINTESTINAL: No abdominal pain. No nausea, vomiting, diarrhea  GENITOURINARY: No dysuria, increase frequency, hematuria, or incontinence  NEUROLOGICAL: No headaches, memory loss, loss of strength, numbness, or tremors, no weakness  EXTREMITY: No pedal edema BLE  SKIN: No itching, burning, rashes, or lesions     VITAL SIGNS:  T(C): 35.6 (05-07-18 @ 11:25), Max: 36.7 (05-07-18 @ 11:00)  T(F): 96.1 (05-07-18 @ 11:25), Max: 98 (05-07-18 @ 11:00)  HR: 75 (05-07-18 @ 11:25) (66 - 96)  BP: 126/76 (05-07-18 @ 11:25) (91/54 - 128/70)  RR: 18 (05-07-18 @ 11:25) (16 - 18)  SpO2: 100% (05-07-18 @ 11:25) (96% - 100%)  Wt(kg): --    PHYSICAL EXAM:    GENERAL: not in any distress  HEENT: Neck is supple, normocephalic, atraumatic   CHEST/LUNG: Clear to auscultation bilaterally; No rales, rhonchi, wheezing  HEART: Regular rate and rhythm; No murmurs, rubs, or gallops  ABDOMEN: Soft, Nontender, Nondistended; Bowel sounds present, no rebound   EXTREMITIES:  2+ Peripheral Pulses, No clubbing, cyanosis, or edema  GENITOURINARY:   SKIN: No rashes or lesions  BACK: no pressor sore   NERVOUS SYSTEM:  Alert & Oriented X3, Good concentration  PSYCH: normal affect     LABS:                         11.0   6.16  )-----------( 190      ( 06 May 2018 06:48 )             36.6     05-06    141  |  106  |  17  ----------------------------<  112<H>  3.7   |  26  |  0.99    Ca    9.6      06 May 2018 06:43                          Vancomycin Level, Trough: 15.8 ug/mL (05-05 @ 14:02)        Culture Results:   No growth to date. (05-04 @ 01:13)  Culture Results:   No growth to date. (05-04 @ 01:13)  Culture Results:   No growth to date. (05-04 @ 01:13)  Culture Results:   No growth to date. (05-04 @ 01:13)                Radiology: HPI:  61 yo male -s/p right knee replacement in 2013- did well but recently had cellulitis of right leg- developed infection requiring iv antibiotics and - developed a wound requiring wound care and infectious disease evaluations currently has wound of right leg, had IV antibiotics via picc line - scheduled for removal of right knee device for spacer and antibiotics until ready for revision of right knee arthroplasty   initial TKR in 2013   all ok till feb   saw a dr prudence torres as outpt who started him on rocephin and dapto due to end 5/31/2018   washout done  5/4       Allergies    latex (Rash)  No Known Drug Allergies    Intolerances        MEDICATIONS  (STANDING):  ascorbic acid 500 milliGRAM(s) Oral two times a day  atorvastatin Oral Tab/Cap - Peds 10 milliGRAM(s) Oral daily  cefepime   IVPB 2000 milliGRAM(s) IV Intermittent every 12 hours  celecoxib 200 milliGRAM(s) Oral every 12 hours  docusate sodium 100 milliGRAM(s) Oral three times a day  folic acid 1 milliGRAM(s) Oral daily  metoprolol tartrate 25 milliGRAM(s) Oral two times a day  multivitamin 1 Tablet(s) Oral daily  pantoprazole    Tablet 40 milliGRAM(s) Oral daily  polyethylene glycol 3350 17 Gram(s) Oral daily  rivaroxaban 10 milliGRAM(s) Oral daily  sodium chloride 0.9%. 1000 milliLiter(s) (110 mL/Hr) IV Continuous <Continuous>  tamsulosin 0.4 milliGRAM(s) Oral daily  vancomycin  IVPB 1000 milliGRAM(s) IV Intermittent every 12 hours    MEDICATIONS  (PRN):  acetaminophen   Tablet 650 milliGRAM(s) Oral every 6 hours PRN For Temp over 38.3 C (100.94 F)  aluminum hydroxide/magnesium hydroxide/simethicone Suspension 30 milliLiter(s) Oral four times a day PRN Indigestion  bisacodyl Suppository 10 milliGRAM(s) Rectal daily PRN If no bowel movement by postoperative day #2  HYDROmorphone  Injectable 1 milliGRAM(s) IV Push every 4 hours PRN breakthrough  magnesium hydroxide Suspension 30 milliLiter(s) Oral daily PRN Constipation  ondansetron   Disintegrating Tablet 4 milliGRAM(s) Oral every 6 hours PRN Nausea and/or Vomiting  oxyCODONE    IR 5 milliGRAM(s) Oral every 4 hours PRN Pain 1 - 5  oxyCODONE    IR 10 milliGRAM(s) Oral every 4 hours PRN Pain 6 - 10  senna 2 Tablet(s) Oral at bedtime PRN Constipation      REVIEW OF SYSTEMS:    CONSTITUTIONAL: No fever, chills, weight loss, or fatigue  HEENT: No sore throat, runny nose, ear ache  RESPIRATORY: No cough, wheezing, No shortness of breath  CARDIOVASCULAR: No chest pain, palpitations, dizziness  GASTROINTESTINAL: No abdominal pain. No nausea, vomiting, diarrhea  GENITOURINARY: No dysuria, increase frequency, hematuria, or incontinence  NEUROLOGICAL: No headaches, memory loss, loss of strength, numbness, or tremors, no weakness  EXTREMITY: swelling effusions rt knee  and pain   SKIN: No itching, burning, rashes, or lesions     VITAL SIGNS:  T(C): 35.6 (05-07-18 @ 11:25), Max: 36.7 (05-07-18 @ 11:00)  T(F): 96.1 (05-07-18 @ 11:25), Max: 98 (05-07-18 @ 11:00)  HR: 75 (05-07-18 @ 11:25) (66 - 96)  BP: 126/76 (05-07-18 @ 11:25) (91/54 - 128/70)  RR: 18 (05-07-18 @ 11:25) (16 - 18)  SpO2: 100% (05-07-18 @ 11:25) (96% - 100%)  Wt(kg): --    PHYSICAL EXAM:    GENERAL: not in any distress  HEENT: Neck is supple, normocephalic, atraumatic   CHEST/LUNG: Clear to auscultation bilaterally; No rales, rhonchi, wheezing  HEART: Regular rate and rhythm; No murmurs, rubs, or gallops  ABDOMEN: Soft, Nontender, Nondistended; Bowel sounds present, no rebound   EXTREMITIES:  2+ Peripheral Pulses, No clubbing, cyanosis, or edema  rt knee >>> left and dressing CDI with a brace  SKIN: No rashes or lesions  BACK: no pressor sore   NERVOUS SYSTEM:  Alert & Oriented X3, Good concentration  PSYCH: normal affect     LABS:                         11.0   6.16  )-----------( 190      ( 06 May 2018 06:48 )             36.6     05-06    141  |  106  |  17  ----------------------------<  112<H>  3.7   |  26  |  0.99    Ca    9.6      06 May 2018 06:43                          Vancomycin Level, Trough: 15.8 ug/mL (05-05 @ 14:02)        Culture Results:   No growth to date. (05-04 @ 01:13)  Culture Results:   No growth to date. (05-04 @ 01:13)  Culture Results:   No growth to date. (05-04 @ 01:13)  Culture Results:   No growth to date. (05-04 @ 01:13)                Radiology:      < from: Xray Knee 1 or 2 Views, Right (05.03.18 @ 14:35) >    IMPRESSION:    Status post right total knee arthroplasty with right knee joint effusion.                ALESHIA SNOWDEN M.D., ATTENDING RADIOLOGIST  This document has been electronically signed. May  3 2018  2:44PM                < end of copied text >

## 2018-05-07 NOTE — PROGRESS NOTE ADULT - PROVIDER SPECIALTY LIST ADULT
Hospitalist
Infectious Disease
Orthopedics
Plastic Surgery
Plastic Surgery
Surgery
Hospitalist

## 2018-05-07 NOTE — PROGRESS NOTE ADULT - SUBJECTIVE AND OBJECTIVE BOX
Pt seen and examined   NAD    Right knee suture line is CDI with no erythema, no collections or hematomas. Immobilizer in place  HVx1 and LISA x1 intact and appropriate    A/P: Pt is stable. All dressing changed  Cont care per primary team  Cont to monitor drain outputs  Cont dressing changes  Will follow

## 2018-05-09 DIAGNOSIS — Z96.653 PRESENCE OF ARTIFICIAL KNEE JOINT, BILATERAL: ICD-10-CM

## 2018-05-09 DIAGNOSIS — N40.0 BENIGN PROSTATIC HYPERPLASIA WITHOUT LOWER URINARY TRACT SYMPTOMS: ICD-10-CM

## 2018-05-09 DIAGNOSIS — Z95.828 PRESENCE OF OTHER VASCULAR IMPLANTS AND GRAFTS: ICD-10-CM

## 2018-05-09 DIAGNOSIS — Z91.040 LATEX ALLERGY STATUS: ICD-10-CM

## 2018-05-09 DIAGNOSIS — M00.9 PYOGENIC ARTHRITIS, UNSPECIFIED: ICD-10-CM

## 2018-05-09 DIAGNOSIS — I48.0 PAROXYSMAL ATRIAL FIBRILLATION: ICD-10-CM

## 2018-05-09 DIAGNOSIS — M25.461 EFFUSION, RIGHT KNEE: ICD-10-CM

## 2018-05-09 DIAGNOSIS — I10 ESSENTIAL (PRIMARY) HYPERTENSION: ICD-10-CM

## 2018-05-09 DIAGNOSIS — L02.415 CUTANEOUS ABSCESS OF RIGHT LOWER LIMB: ICD-10-CM

## 2018-05-18 LAB
CULTURE RESULTS: SIGNIFICANT CHANGE UP
GRAM STN FLD: SIGNIFICANT CHANGE UP
SPECIMEN SOURCE: SIGNIFICANT CHANGE UP

## 2018-07-20 PROBLEM — N40.0 BENIGN PROSTATIC HYPERPLASIA WITHOUT LOWER URINARY TRACT SYMPTOMS: Chronic | Status: ACTIVE | Noted: 2018-04-26

## 2018-07-20 PROBLEM — I10 ESSENTIAL (PRIMARY) HYPERTENSION: Chronic | Status: ACTIVE | Noted: 2018-04-26

## 2018-07-26 ENCOUNTER — OUTPATIENT (OUTPATIENT)
Dept: OUTPATIENT SERVICES | Facility: HOSPITAL | Age: 63
LOS: 1 days | Discharge: HOME | End: 2018-07-26
Payer: COMMERCIAL

## 2018-07-26 VITALS
DIASTOLIC BLOOD PRESSURE: 79 MMHG | HEIGHT: 67 IN | HEART RATE: 68 BPM | SYSTOLIC BLOOD PRESSURE: 127 MMHG | RESPIRATION RATE: 18 BRPM | WEIGHT: 179.46 LBS | OXYGEN SATURATION: 97 % | TEMPERATURE: 98 F

## 2018-07-26 DIAGNOSIS — N40.0 BENIGN PROSTATIC HYPERPLASIA WITHOUT LOWER URINARY TRACT SYMPTOMS: ICD-10-CM

## 2018-07-26 DIAGNOSIS — I10 ESSENTIAL (PRIMARY) HYPERTENSION: ICD-10-CM

## 2018-07-26 DIAGNOSIS — T84.59XA INFECTION AND INFLAMMATORY REACTION DUE TO OTHER INTERNAL JOINT PROSTHESIS, INITIAL ENCOUNTER: ICD-10-CM

## 2018-07-26 DIAGNOSIS — I48.91 UNSPECIFIED ATRIAL FIBRILLATION: ICD-10-CM

## 2018-07-26 DIAGNOSIS — M79.605 PAIN IN LEFT LEG: Chronic | ICD-10-CM

## 2018-07-26 DIAGNOSIS — Z98.890 OTHER SPECIFIED POSTPROCEDURAL STATES: Chronic | ICD-10-CM

## 2018-07-26 DIAGNOSIS — Z96.659 PRESENCE OF UNSPECIFIED ARTIFICIAL KNEE JOINT: Chronic | ICD-10-CM

## 2018-07-26 DIAGNOSIS — Z01.818 ENCOUNTER FOR OTHER PREPROCEDURAL EXAMINATION: ICD-10-CM

## 2018-07-26 LAB
ANION GAP SERPL CALC-SCNC: 10 MMOL/L — SIGNIFICANT CHANGE UP (ref 5–17)
APTT BLD: 31.9 SEC — SIGNIFICANT CHANGE UP (ref 27.5–37.4)
BASOPHILS # BLD AUTO: 0.03 K/UL — SIGNIFICANT CHANGE UP (ref 0–0.2)
BASOPHILS NFR BLD AUTO: 0.4 % — SIGNIFICANT CHANGE UP (ref 0–2)
BLD GP AB SCN SERPL QL: SIGNIFICANT CHANGE UP
BUN SERPL-MCNC: 25 MG/DL — HIGH (ref 7–23)
CALCIUM SERPL-MCNC: 9.5 MG/DL — SIGNIFICANT CHANGE UP (ref 8.5–10.1)
CHLORIDE SERPL-SCNC: 106 MMOL/L — SIGNIFICANT CHANGE UP (ref 96–108)
CO2 SERPL-SCNC: 26 MMOL/L — SIGNIFICANT CHANGE UP (ref 22–31)
CREAT SERPL-MCNC: 0.99 MG/DL — SIGNIFICANT CHANGE UP (ref 0.5–1.3)
EOSINOPHIL # BLD AUTO: 0.13 K/UL — SIGNIFICANT CHANGE UP (ref 0–0.5)
EOSINOPHIL NFR BLD AUTO: 1.9 % — SIGNIFICANT CHANGE UP (ref 0–6)
GLUCOSE SERPL-MCNC: 127 MG/DL — HIGH (ref 70–99)
HBA1C BLD-MCNC: 5.4 % — SIGNIFICANT CHANGE UP (ref 4–5.6)
HCT VFR BLD CALC: 45.7 % — SIGNIFICANT CHANGE UP (ref 39–50)
HGB BLD-MCNC: 14.7 G/DL — SIGNIFICANT CHANGE UP (ref 13–17)
IMM GRANULOCYTES NFR BLD AUTO: 0.4 % — SIGNIFICANT CHANGE UP (ref 0–1.5)
INR BLD: 1.04 RATIO — SIGNIFICANT CHANGE UP (ref 0.88–1.16)
LYMPHOCYTES # BLD AUTO: 1.61 K/UL — SIGNIFICANT CHANGE UP (ref 1–3.3)
LYMPHOCYTES # BLD AUTO: 24 % — SIGNIFICANT CHANGE UP (ref 13–44)
MCHC RBC-ENTMCNC: 28.1 PG — SIGNIFICANT CHANGE UP (ref 27–34)
MCHC RBC-ENTMCNC: 32.2 GM/DL — SIGNIFICANT CHANGE UP (ref 32–36)
MCV RBC AUTO: 87.4 FL — SIGNIFICANT CHANGE UP (ref 80–100)
MONOCYTES # BLD AUTO: 0.49 K/UL — SIGNIFICANT CHANGE UP (ref 0–0.9)
MONOCYTES NFR BLD AUTO: 7.3 % — SIGNIFICANT CHANGE UP (ref 2–14)
MRSA PCR RESULT.: SIGNIFICANT CHANGE UP
NEUTROPHILS # BLD AUTO: 4.41 K/UL — SIGNIFICANT CHANGE UP (ref 1.8–7.4)
NEUTROPHILS NFR BLD AUTO: 66 % — SIGNIFICANT CHANGE UP (ref 43–77)
NRBC # BLD: 0 /100 WBCS — SIGNIFICANT CHANGE UP (ref 0–0)
PLATELET # BLD AUTO: 179 K/UL — SIGNIFICANT CHANGE UP (ref 150–400)
POTASSIUM SERPL-MCNC: 3.8 MMOL/L — SIGNIFICANT CHANGE UP (ref 3.5–5.3)
POTASSIUM SERPL-SCNC: 3.8 MMOL/L — SIGNIFICANT CHANGE UP (ref 3.5–5.3)
PROTHROM AB SERPL-ACNC: 11.3 SEC — SIGNIFICANT CHANGE UP (ref 9.8–12.7)
RBC # BLD: 5.23 M/UL — SIGNIFICANT CHANGE UP (ref 4.2–5.8)
RBC # FLD: 15.9 % — HIGH (ref 10.3–14.5)
S AUREUS DNA NOSE QL NAA+PROBE: SIGNIFICANT CHANGE UP
SODIUM SERPL-SCNC: 142 MMOL/L — SIGNIFICANT CHANGE UP (ref 135–145)
WBC # BLD: 6.7 K/UL — SIGNIFICANT CHANGE UP (ref 3.8–10.5)
WBC # FLD AUTO: 6.7 K/UL — SIGNIFICANT CHANGE UP (ref 3.8–10.5)

## 2018-07-26 PROCEDURE — 93010 ELECTROCARDIOGRAM REPORT: CPT | Mod: NC

## 2018-07-26 RX ORDER — CEFTRIAXONE 500 MG/1
1 INJECTION, POWDER, FOR SOLUTION INTRAMUSCULAR; INTRAVENOUS
Qty: 0 | Refills: 0 | COMMUNITY

## 2018-07-26 RX ORDER — DAPTOMYCIN 500 MG/10ML
1 INJECTION, POWDER, LYOPHILIZED, FOR SOLUTION INTRAVENOUS
Qty: 0 | Refills: 0 | COMMUNITY

## 2018-07-26 NOTE — OCCUPATIONAL THERAPY INITIAL EVALUATION ADULT - FINE MOTOR COORDINATION, FINE MOTOR COORDINATION TESTS, OT EVAL
Patient-specific activity scoring scheme (Point to one number): 0 -------5-------- 10 (0) =Unable to perform activity (10) -- Able to perform activity at the same level as before injury or problem. Activity: Standing__7____, Activity: Walking_____7_______

## 2018-07-26 NOTE — PHYSICAL THERAPY INITIAL EVALUATION ADULT - MODIFIED CLINICAL TEST OF SENSORY INTEGRATION IN BALANCE TEST
5x sit to stand = 13.55 seconds. 2MWT = 320ft with straight cane (decreased step & stride length, decreased weight acceptance onto RLE). Stairs: step-to-step with R rail for ascent & straight cane in L hand

## 2018-07-26 NOTE — PHYSICAL THERAPY INITIAL EVALUATION ADULT - ADDITIONAL COMMENTS
Pt lives with his wife (whom does not work & can provide assistance for all functional mobility as needed upon D/C home) in a private home. One entry step (no rail). Pt states there is a fence that he can hold onto. PT recommends installation of railing or grab bar-pt demonstrates understanding but states he managed well after last surgery. Pt is independent with all functional mobility including community ambulation without a device. Pt independent with ADL's. Pt is right hand dominant, wears eye glasses for reading, drives, has bilateral hearing aids, & is retired. Pt owns straight cane. Pt has walk in shower stall with retractable shower head & grab bars. Toilet is standard height. Pt reports limited pain throughout the day (no pain meds) but states he avoids stairs as that is the most difficult task to perform. Goal of therapy: improve functional mobility.    since last surgery pt now has rolling walker & crutches

## 2018-07-26 NOTE — OCCUPATIONAL THERAPY INITIAL EVALUATION ADULT - ADDITIONAL COMMENTS
As per patient and PT document, Pt lives with his wife (whom does not work & can provide assistance for all functional mobility as needed upon D/C home) in a private home. One entry step (no rail). Pt states there is a fence that he can hold onto. PT recommends installation of railing or grab bar-pt demonstrates understanding but states he managed well after last surgery. Pt is independent with all functional mobility including community ambulation without a device. Pt independent with ADL's. Pt is right hand dominant, wears eye glasses for reading, drives, has bilateral hearing aids, & is retired. Pt owns straight cane. Pt has walk in shower stall with retractable shower head & grab bars. Toilet is standard height. Pt reports limited pain throughout the day (no pain meds) but states he avoids stairs as that is the most difficult task to perform. Goal of therapy: improve functional mobility.

## 2018-07-26 NOTE — H&P PST ADULT - ASSESSMENT
63M pmh htn, afib, hl, bph, divewrticulosis here for PST for scheduled Revision right total knee arthroplasty  CAPRINI SCORE    AGE RELATED RISK FACTORS                                                       MOBILITY RELATED FACTORS  [ ] Age 41-60 years                                            (1 Point)                  [ ] Bed rest                                                        (1 Point)  [ ] Age: 61-74 years                                           (2 Points)                [ ] Plaster cast                                                   (2 Points)  [ ] Age= 75 years                                              (3 Points)                 [ ] Bed bound for more than 72 hours                   (2 Points)    DISEASE RELATED RISK FACTORS                                               GENDER SPECIFIC FACTORS  [ ] Edema in the lower extremities                       (1 Point)                  [ ] Pregnancy                                                     (1 Point)  [ ] Varicose veins                                               (1 Point)                  [ ] Post-partum < 6 weeks                                   (1 Point)             [ ] BMI > 25 Kg/m2                                            (1 Point)                  [ ] Hormonal therapy  or oral contraception            (1 Point)                 [ ] Sepsis (in the previous month)                        (1 Point)                  [ ] History of pregnancy complications  [ ] Pneumonia or serious lung disease                                               [ ] Unexplained or recurrent                       (1 Point)           (in the previous month)                               (1 Point)  [ ] Abnormal pulmonary function test                     (1 Point)                 SURGERY RELATED RISK FACTORS  [ ] Acute myocardial infarction                              (1 Point)                 [ ]  Section                                            (1 Point)  [ ] Congestive heart failure (in the previous month)  (1 Point)                 [ ] Minor surgery                                                 (1 Point)   [ ] Inflammatory bowel disease                             (1 Point)                 [ ] Arthroscopic surgery                                        (2 Points)  [ ] Central venous access                                    (2 Points)                [ ] General surgery lasting more than 45 minutes   (2 Points)       [ ] Stroke (in the previous month)                          (5 Points)               [ ] Elective arthroplasty                                        (5 Points)                                                                                                                                               HEMATOLOGY RELATED FACTORS                                                 TRAUMA RELATED RISK FACTORS  [ ] Prior episodes of VTE                                     (3 Points)                 [ ] Fracture of the hip, pelvis, or leg                       (5 Points)  [ ] Positive family history for VTE                         (3 Points)                 [ ] Acute spinal cord injury (in the previous month)  (5 Points)  [ ] Prothrombin 58288 A                                      (3 Points)                 [ ] Paralysis  (less than 1 month)                          (5 Points)  [ ] Factor V Leiden                                             (3 Points)                 [ ] Multiple Trauma within 1 month                         (5 Points)  [ ] Lupus anticoagulants                                     (3 Points)                                                           [ ] Anticardiolipin antibodies                                (3 Points)                                                       [ ] High homocysteine in the blood                      (3 Points)                                             [ ] Other congenital or acquired thrombophilia       (3 Points)                                                [ ] Heparin induced thrombocytopenia                  (3 Points)                                          Total Score [          ] 63M pmh htn, afib, hl, bph, divewrticulosis here for PST for scheduled Revision right total knee arthroplasty  CAPRINI SCORE    AGE RELATED RISK FACTORS                                                       MOBILITY RELATED FACTORS  [ ] Age 41-60 years                                            (1 Point)                  [ ] Bed rest                                                        (1 Point)  [x ] Age: 61-74 years                                           (2 Points)                [ ] Plaster cast                                                   (2 Points)  [ ] Age= 75 years                                              (3 Points)                 [ ] Bed bound for more than 72 hours                   (2 Points)    DISEASE RELATED RISK FACTORS                                               GENDER SPECIFIC FACTORS  [ ] Edema in the lower extremities                       (1 Point)                  [ ] Pregnancy                                                     (1 Point)  [ ] Varicose veins                                               (1 Point)                  [ ] Post-partum < 6 weeks                                   (1 Point)             [x ] BMI > 25 Kg/m2                                            (1 Point)                  [ ] Hormonal therapy  or oral contraception            (1 Point)                 [ ] Sepsis (in the previous month)                        (1 Point)                  [ ] History of pregnancy complications  [ ] Pneumonia or serious lung disease                                               [ ] Unexplained or recurrent                       (1 Point)           (in the previous month)                               (1 Point)  [ ] Abnormal pulmonary function test                     (1 Point)                 SURGERY RELATED RISK FACTORS  [ ] Acute myocardial infarction                              (1 Point)                 [ ]  Section                                            (1 Point)  [ ] Congestive heart failure (in the previous month)  (1 Point)                 [ ] Minor surgery                                                 (1 Point)   [ ] Inflammatory bowel disease                             (1 Point)                 [ ] Arthroscopic surgery                                        (2 Points)  [ ] Central venous access                                    (2 Points)                [ ] General surgery lasting more than 45 minutes   (2 Points)       [ ] Stroke (in the previous month)                          (5 Points)               [x ] Elective arthroplasty                                        (5 Points)                                                                                                                                               HEMATOLOGY RELATED FACTORS                                                 TRAUMA RELATED RISK FACTORS  [ ] Prior episodes of VTE                                     (3 Points)                 [ ] Fracture of the hip, pelvis, or leg                       (5 Points)  [ ] Positive family history for VTE                         (3 Points)                 [ ] Acute spinal cord injury (in the previous month)  (5 Points)  [ ] Prothrombin 97992 A                                      (3 Points)                 [ ] Paralysis  (less than 1 month)                          (5 Points)  [ ] Factor V Leiden                                             (3 Points)                 [ ] Multiple Trauma within 1 month                         (5 Points)  [ ] Lupus anticoagulants                                     (3 Points)                                                           [ ] Anticardiolipin antibodies                                (3 Points)                                                       [ ] High homocysteine in the blood                      (3 Points)                                             [ ] Other congenital or acquired thrombophilia       (3 Points)                                                [ ] Heparin induced thrombocytopenia                  (3 Points)                                          Total Score [      8    ]

## 2018-08-07 ENCOUNTER — TRANSCRIPTION ENCOUNTER (OUTPATIENT)
Age: 63
End: 2018-08-07

## 2018-08-08 ENCOUNTER — RESULT REVIEW (OUTPATIENT)
Age: 63
End: 2018-08-08

## 2018-08-08 ENCOUNTER — INPATIENT (INPATIENT)
Facility: HOSPITAL | Age: 63
LOS: 1 days | Discharge: HOME | End: 2018-08-10
Attending: ORTHOPAEDIC SURGERY | Admitting: ORTHOPAEDIC SURGERY
Payer: COMMERCIAL

## 2018-08-08 VITALS
DIASTOLIC BLOOD PRESSURE: 79 MMHG | HEIGHT: 67 IN | WEIGHT: 179.46 LBS | OXYGEN SATURATION: 97 % | TEMPERATURE: 97 F | HEART RATE: 58 BPM | SYSTOLIC BLOOD PRESSURE: 142 MMHG | RESPIRATION RATE: 16 BRPM

## 2018-08-08 DIAGNOSIS — Z96.659 PRESENCE OF UNSPECIFIED ARTIFICIAL KNEE JOINT: Chronic | ICD-10-CM

## 2018-08-08 DIAGNOSIS — M79.605 PAIN IN LEFT LEG: Chronic | ICD-10-CM

## 2018-08-08 DIAGNOSIS — Z98.890 OTHER SPECIFIED POSTPROCEDURAL STATES: Chronic | ICD-10-CM

## 2018-08-08 LAB
HCT VFR BLD CALC: 44.5 % — SIGNIFICANT CHANGE UP (ref 39–50)
HGB BLD-MCNC: 14.2 G/DL — SIGNIFICANT CHANGE UP (ref 13–17)
MCHC RBC-ENTMCNC: 28.2 PG — SIGNIFICANT CHANGE UP (ref 27–34)
MCHC RBC-ENTMCNC: 31.9 GM/DL — LOW (ref 32–36)
MCV RBC AUTO: 88.5 FL — SIGNIFICANT CHANGE UP (ref 80–100)
NRBC # BLD: 0 /100 WBCS — SIGNIFICANT CHANGE UP (ref 0–0)
PLATELET # BLD AUTO: 158 K/UL — SIGNIFICANT CHANGE UP (ref 150–400)
RBC # BLD: 5.03 M/UL — SIGNIFICANT CHANGE UP (ref 4.2–5.8)
RBC # FLD: 15.3 % — HIGH (ref 10.3–14.5)
WBC # BLD: 9.5 K/UL — SIGNIFICANT CHANGE UP (ref 3.8–10.5)
WBC # FLD AUTO: 9.5 K/UL — SIGNIFICANT CHANGE UP (ref 3.8–10.5)

## 2018-08-08 PROCEDURE — 88300 SURGICAL PATH GROSS: CPT | Mod: 26,59

## 2018-08-08 PROCEDURE — 88331 PATH CONSLTJ SURG 1 BLK 1SPC: CPT | Mod: 26

## 2018-08-08 PROCEDURE — 88305 TISSUE EXAM BY PATHOLOGIST: CPT | Mod: 26

## 2018-08-08 RX ORDER — CEFAZOLIN SODIUM 1 G
2000 VIAL (EA) INJECTION EVERY 8 HOURS
Qty: 0 | Refills: 0 | Status: COMPLETED | OUTPATIENT
Start: 2018-08-08 | End: 2018-08-09

## 2018-08-08 RX ORDER — POLYETHYLENE GLYCOL 3350 17 G/17G
17 POWDER, FOR SOLUTION ORAL DAILY
Qty: 0 | Refills: 0 | Status: DISCONTINUED | OUTPATIENT
Start: 2018-08-08 | End: 2018-08-10

## 2018-08-08 RX ORDER — OXYCODONE HYDROCHLORIDE 5 MG/1
5 TABLET ORAL EVERY 4 HOURS
Qty: 0 | Refills: 0 | Status: DISCONTINUED | OUTPATIENT
Start: 2018-08-09 | End: 2018-08-10

## 2018-08-08 RX ORDER — MORPHINE SULFATE 50 MG/1
4 CAPSULE, EXTENDED RELEASE ORAL
Qty: 0 | Refills: 0 | Status: DISCONTINUED | OUTPATIENT
Start: 2018-08-08 | End: 2018-08-08

## 2018-08-08 RX ORDER — ACETAMINOPHEN 500 MG
1000 TABLET ORAL ONCE
Qty: 0 | Refills: 0 | Status: DISCONTINUED | OUTPATIENT
Start: 2018-08-08 | End: 2018-08-08

## 2018-08-08 RX ORDER — MAGNESIUM HYDROXIDE 400 MG/1
30 TABLET, CHEWABLE ORAL DAILY
Qty: 0 | Refills: 0 | Status: DISCONTINUED | OUTPATIENT
Start: 2018-08-08 | End: 2018-08-10

## 2018-08-08 RX ORDER — ACETAMINOPHEN 500 MG
975 TABLET ORAL EVERY 8 HOURS
Qty: 0 | Refills: 0 | Status: DISCONTINUED | OUTPATIENT
Start: 2018-08-08 | End: 2018-08-10

## 2018-08-08 RX ORDER — PANTOPRAZOLE SODIUM 20 MG/1
40 TABLET, DELAYED RELEASE ORAL DAILY
Qty: 0 | Refills: 0 | Status: DISCONTINUED | OUTPATIENT
Start: 2018-08-08 | End: 2018-08-10

## 2018-08-08 RX ORDER — DOCUSATE SODIUM 100 MG
100 CAPSULE ORAL THREE TIMES A DAY
Qty: 0 | Refills: 0 | Status: DISCONTINUED | OUTPATIENT
Start: 2018-08-08 | End: 2018-08-10

## 2018-08-08 RX ORDER — SENNA PLUS 8.6 MG/1
2 TABLET ORAL AT BEDTIME
Qty: 0 | Refills: 0 | Status: DISCONTINUED | OUTPATIENT
Start: 2018-08-08 | End: 2018-08-10

## 2018-08-08 RX ORDER — SODIUM CHLORIDE 9 MG/ML
1000 INJECTION, SOLUTION INTRAVENOUS
Qty: 0 | Refills: 0 | Status: DISCONTINUED | OUTPATIENT
Start: 2018-08-08 | End: 2018-08-08

## 2018-08-08 RX ORDER — OXYCODONE HYDROCHLORIDE 5 MG/1
20 TABLET ORAL ONCE
Qty: 0 | Refills: 0 | Status: DISCONTINUED | OUTPATIENT
Start: 2018-08-08 | End: 2018-08-08

## 2018-08-08 RX ORDER — HYDROMORPHONE HYDROCHLORIDE 2 MG/ML
1 INJECTION INTRAMUSCULAR; INTRAVENOUS; SUBCUTANEOUS
Qty: 0 | Refills: 0 | Status: DISCONTINUED | OUTPATIENT
Start: 2018-08-08 | End: 2018-08-10

## 2018-08-08 RX ORDER — ONDANSETRON 8 MG/1
4 TABLET, FILM COATED ORAL EVERY 6 HOURS
Qty: 0 | Refills: 0 | Status: DISCONTINUED | OUTPATIENT
Start: 2018-08-08 | End: 2018-08-10

## 2018-08-08 RX ORDER — ACETAMINOPHEN 500 MG
650 TABLET ORAL ONCE
Qty: 0 | Refills: 0 | Status: COMPLETED | OUTPATIENT
Start: 2018-08-08 | End: 2018-08-08

## 2018-08-08 RX ORDER — ASPIRIN/CALCIUM CARB/MAGNESIUM 324 MG
325 TABLET ORAL EVERY 12 HOURS
Qty: 0 | Refills: 0 | Status: DISCONTINUED | OUTPATIENT
Start: 2018-08-09 | End: 2018-08-10

## 2018-08-08 RX ORDER — ASCORBIC ACID 60 MG
500 TABLET,CHEWABLE ORAL
Qty: 0 | Refills: 0 | Status: DISCONTINUED | OUTPATIENT
Start: 2018-08-08 | End: 2018-08-10

## 2018-08-08 RX ORDER — FOLIC ACID 0.8 MG
1 TABLET ORAL DAILY
Qty: 0 | Refills: 0 | Status: DISCONTINUED | OUTPATIENT
Start: 2018-08-08 | End: 2018-08-10

## 2018-08-08 RX ORDER — TAMSULOSIN HYDROCHLORIDE 0.4 MG/1
0.4 CAPSULE ORAL AT BEDTIME
Qty: 0 | Refills: 0 | Status: DISCONTINUED | OUTPATIENT
Start: 2018-08-08 | End: 2018-08-08

## 2018-08-08 RX ORDER — OXYCODONE HYDROCHLORIDE 5 MG/1
5 TABLET ORAL EVERY 4 HOURS
Qty: 0 | Refills: 0 | Status: DISCONTINUED | OUTPATIENT
Start: 2018-08-08 | End: 2018-08-09

## 2018-08-08 RX ORDER — CELECOXIB 200 MG/1
200 CAPSULE ORAL ONCE
Qty: 0 | Refills: 0 | Status: COMPLETED | OUTPATIENT
Start: 2018-08-08 | End: 2018-08-08

## 2018-08-08 RX ORDER — ONDANSETRON 8 MG/1
4 TABLET, FILM COATED ORAL ONCE
Qty: 0 | Refills: 0 | Status: DISCONTINUED | OUTPATIENT
Start: 2018-08-08 | End: 2018-08-08

## 2018-08-08 RX ORDER — DEXAMETHASONE 0.5 MG/5ML
10 ELIXIR ORAL ONCE
Qty: 0 | Refills: 0 | Status: COMPLETED | OUTPATIENT
Start: 2018-08-09 | End: 2018-08-09

## 2018-08-08 RX ORDER — ATORVASTATIN CALCIUM 80 MG/1
10 TABLET, FILM COATED ORAL AT BEDTIME
Qty: 0 | Refills: 0 | Status: DISCONTINUED | OUTPATIENT
Start: 2018-08-08 | End: 2018-08-08

## 2018-08-08 RX ORDER — BENZOCAINE AND MENTHOL 5; 1 G/100ML; G/100ML
1 LIQUID ORAL
Qty: 0 | Refills: 0 | Status: DISCONTINUED | OUTPATIENT
Start: 2018-08-08 | End: 2018-08-10

## 2018-08-08 RX ORDER — OXYCODONE HYDROCHLORIDE 5 MG/1
10 TABLET ORAL EVERY 4 HOURS
Qty: 0 | Refills: 0 | Status: DISCONTINUED | OUTPATIENT
Start: 2018-08-08 | End: 2018-08-10

## 2018-08-08 RX ORDER — SODIUM CHLORIDE 9 MG/ML
1000 INJECTION INTRAMUSCULAR; INTRAVENOUS; SUBCUTANEOUS
Qty: 0 | Refills: 0 | Status: DISCONTINUED | OUTPATIENT
Start: 2018-08-08 | End: 2018-08-09

## 2018-08-08 RX ORDER — METOPROLOL TARTRATE 50 MG
50 TABLET ORAL
Qty: 0 | Refills: 0 | Status: DISCONTINUED | OUTPATIENT
Start: 2018-08-08 | End: 2018-08-08

## 2018-08-08 RX ORDER — SODIUM CHLORIDE 9 MG/ML
3 INJECTION INTRAMUSCULAR; INTRAVENOUS; SUBCUTANEOUS EVERY 8 HOURS
Qty: 0 | Refills: 0 | Status: DISCONTINUED | OUTPATIENT
Start: 2018-08-08 | End: 2018-08-08

## 2018-08-08 RX ADMIN — Medication 975 MILLIGRAM(S): at 23:00

## 2018-08-08 RX ADMIN — OXYCODONE HYDROCHLORIDE 20 MILLIGRAM(S): 5 TABLET ORAL at 12:32

## 2018-08-08 RX ADMIN — Medication 100 MILLIGRAM(S): at 23:58

## 2018-08-08 RX ADMIN — Medication 975 MILLIGRAM(S): at 22:23

## 2018-08-08 RX ADMIN — Medication 650 MILLIGRAM(S): at 12:32

## 2018-08-08 RX ADMIN — CELECOXIB 200 MILLIGRAM(S): 200 CAPSULE ORAL at 12:32

## 2018-08-08 RX ADMIN — CELECOXIB 200 MILLIGRAM(S): 200 CAPSULE ORAL at 12:33

## 2018-08-08 RX ADMIN — OXYCODONE HYDROCHLORIDE 20 MILLIGRAM(S): 5 TABLET ORAL at 12:33

## 2018-08-08 NOTE — BRIEF OPERATIVE NOTE - PROCEDURE
Revision total knee arthroplasty  08/08/2018  right  Active  [id number] <<-----Click on this checkbox to enter Procedure

## 2018-08-08 NOTE — PROGRESS NOTE ADULT - SUBJECTIVE AND OBJECTIVE BOX
ORTHO POSTOP CHECK:  Pt. seen and examined at bedside resting comfortably s/p revision of right TKA POD 0. Patient denies pain, N/V, and no complaints offered. Tolerating reg. diet. +flatus.   Denies fever/chills, chest pain, dyspnea, cough, dizziness.     Vital Signs Last 24 Hrs  T(C): 36.1 (08 Aug 2018 22:31), Max: 36.2 (08 Aug 2018 11:34)  T(F): 96.9 (08 Aug 2018 22:31), Max: 97.2 (08 Aug 2018 11:34)  HR: 69 (08 Aug 2018 22:31) (57 - 69)  BP: 100/69 (08 Aug 2018 22:31) (100/69 - 142/79)  RR: 17 (08 Aug 2018 22:31) (12 - 17)  SpO2: 96% (08 Aug 2018 22:31) (96% - 100%)    PHYSICAL EXAM:    GENERAL: NAD, alert and awake.   CHEST/LUNG: Clear to ausculation, bilaterally   HEART: S1S2  ABDOMEN: soft, NT/ND  EXTREMITIES:  RIGHT LE -- dressing C/D/I. Sensation intact throughout extremity. Motor function intact, +flex/ext knee, +dorsiflex/plantar flex ankle, +EHL/FHL. Compartments soft, calf nontender b/l. 2+ DP/PT pulses.   LEFT LE neurovascularly intact. IPCs in place b/l.     I&O's Detail      LABS:                        14.2   9.50  )-----------( 158      ( 08 Aug 2018 20:30 )             44.5       Impression: 63y Male s/p Revision of Right TKA POD 0 , postop stable.     Plan:  -pain management prn, bowel regimen  -postop abx  -DVT prophylaxis: ASA and IPCs  -PT/OT, WBAT, OOB, Ambulating, encourage incentive spirometer  -continue local wound care  -Continue medical management/supportive care  -f/u AM labs  -D/C planning  -will discuss pt. with ortho attending

## 2018-08-08 NOTE — PATIENT PROFILE ADULT. - NS PRO OT REFERRAL QUES 1 YN
Sent from PCP after hyperkalemia on labs today. Pt reports fatigue over the last several weeks.     Appearance: Sitting up in bed with no acute distress noted. Family at BS.     HEENT: Atraumatic. Mucous membranes moist and intact.   Skin: Skin is warm and dry with good skin turgor; Skin is intact; color consistent with ethnicity.  Musculoskeletal: Moves all extremities well in full range of motion. No obvious deformities or swelling noted.   Respiratory: Airway open and patent. Respirations spontaneous, even and unlabored. No accessory muscles in use.  Cardiac: Regular rate and rhythm. No peripheral edema noted. 2+ pulses palpated peripherally.  Abdomen: Soft, non-tender to palpation. No distention noted.   Neurologic: Alert, awake, and appropriate. Normal speech. No acute neurological deficits noted. Pt reports generalized fatigue.   no

## 2018-08-09 LAB
ANION GAP SERPL CALC-SCNC: 10 MMOL/L — SIGNIFICANT CHANGE UP (ref 5–17)
BUN SERPL-MCNC: 28 MG/DL — HIGH (ref 7–23)
CALCIUM SERPL-MCNC: 8.5 MG/DL — SIGNIFICANT CHANGE UP (ref 8.5–10.1)
CHLORIDE SERPL-SCNC: 105 MMOL/L — SIGNIFICANT CHANGE UP (ref 96–108)
CO2 SERPL-SCNC: 24 MMOL/L — SIGNIFICANT CHANGE UP (ref 22–31)
CREAT SERPL-MCNC: 1.35 MG/DL — HIGH (ref 0.5–1.3)
GLUCOSE SERPL-MCNC: 158 MG/DL — HIGH (ref 70–99)
GRAM STN FLD: SIGNIFICANT CHANGE UP
HCT VFR BLD CALC: 41.3 % — SIGNIFICANT CHANGE UP (ref 39–50)
HGB BLD-MCNC: 13.3 G/DL — SIGNIFICANT CHANGE UP (ref 13–17)
MCHC RBC-ENTMCNC: 28.1 PG — SIGNIFICANT CHANGE UP (ref 27–34)
MCHC RBC-ENTMCNC: 32.2 GM/DL — SIGNIFICANT CHANGE UP (ref 32–36)
MCV RBC AUTO: 87.1 FL — SIGNIFICANT CHANGE UP (ref 80–100)
NRBC # BLD: 0 /100 WBCS — SIGNIFICANT CHANGE UP (ref 0–0)
PLATELET # BLD AUTO: 193 K/UL — SIGNIFICANT CHANGE UP (ref 150–400)
POTASSIUM SERPL-MCNC: 4.4 MMOL/L — SIGNIFICANT CHANGE UP (ref 3.5–5.3)
POTASSIUM SERPL-SCNC: 4.4 MMOL/L — SIGNIFICANT CHANGE UP (ref 3.5–5.3)
RBC # BLD: 4.74 M/UL — SIGNIFICANT CHANGE UP (ref 4.2–5.8)
RBC # FLD: 14.9 % — HIGH (ref 10.3–14.5)
SODIUM SERPL-SCNC: 139 MMOL/L — SIGNIFICANT CHANGE UP (ref 135–145)
SPECIMEN SOURCE: SIGNIFICANT CHANGE UP
WBC # BLD: 12.78 K/UL — HIGH (ref 3.8–10.5)
WBC # FLD AUTO: 12.78 K/UL — HIGH (ref 3.8–10.5)

## 2018-08-09 PROCEDURE — 73560 X-RAY EXAM OF KNEE 1 OR 2: CPT | Mod: 26,RT

## 2018-08-09 PROCEDURE — 99223 1ST HOSP IP/OBS HIGH 75: CPT

## 2018-08-09 RX ORDER — ATORVASTATIN CALCIUM 80 MG/1
10 TABLET, FILM COATED ORAL AT BEDTIME
Qty: 0 | Refills: 0 | Status: DISCONTINUED | OUTPATIENT
Start: 2018-08-09 | End: 2018-08-10

## 2018-08-09 RX ORDER — SODIUM CHLORIDE 9 MG/ML
1000 INJECTION INTRAMUSCULAR; INTRAVENOUS; SUBCUTANEOUS ONCE
Qty: 0 | Refills: 0 | Status: COMPLETED | OUTPATIENT
Start: 2018-08-09 | End: 2018-08-09

## 2018-08-09 RX ORDER — TAMSULOSIN HYDROCHLORIDE 0.4 MG/1
0.4 CAPSULE ORAL AT BEDTIME
Qty: 0 | Refills: 0 | Status: DISCONTINUED | OUTPATIENT
Start: 2018-08-09 | End: 2018-08-10

## 2018-08-09 RX ORDER — METOPROLOL TARTRATE 50 MG
50 TABLET ORAL
Qty: 0 | Refills: 0 | Status: DISCONTINUED | OUTPATIENT
Start: 2018-08-09 | End: 2018-08-10

## 2018-08-09 RX ADMIN — Medication 325 MILLIGRAM(S): at 18:02

## 2018-08-09 RX ADMIN — SODIUM CHLORIDE 110 MILLILITER(S): 9 INJECTION INTRAMUSCULAR; INTRAVENOUS; SUBCUTANEOUS at 00:01

## 2018-08-09 RX ADMIN — Medication 50 MILLIGRAM(S): at 07:36

## 2018-08-09 RX ADMIN — Medication 975 MILLIGRAM(S): at 05:46

## 2018-08-09 RX ADMIN — Medication 500 MILLIGRAM(S): at 18:01

## 2018-08-09 RX ADMIN — Medication 975 MILLIGRAM(S): at 23:37

## 2018-08-09 RX ADMIN — Medication 975 MILLIGRAM(S): at 13:57

## 2018-08-09 RX ADMIN — Medication 500 MILLIGRAM(S): at 05:46

## 2018-08-09 RX ADMIN — PANTOPRAZOLE SODIUM 40 MILLIGRAM(S): 20 TABLET, DELAYED RELEASE ORAL at 11:49

## 2018-08-09 RX ADMIN — Medication 1 MILLIGRAM(S): at 11:49

## 2018-08-09 RX ADMIN — Medication 100 MILLIGRAM(S): at 07:36

## 2018-08-09 RX ADMIN — ATORVASTATIN CALCIUM 10 MILLIGRAM(S): 80 TABLET, FILM COATED ORAL at 22:37

## 2018-08-09 RX ADMIN — Medication 975 MILLIGRAM(S): at 22:37

## 2018-08-09 RX ADMIN — Medication 975 MILLIGRAM(S): at 14:50

## 2018-08-09 RX ADMIN — Medication 102 MILLIGRAM(S): at 05:45

## 2018-08-09 RX ADMIN — TAMSULOSIN HYDROCHLORIDE 0.4 MILLIGRAM(S): 0.4 CAPSULE ORAL at 22:36

## 2018-08-09 RX ADMIN — Medication 1 TABLET(S): at 11:49

## 2018-08-09 RX ADMIN — SODIUM CHLORIDE 1000 MILLILITER(S): 9 INJECTION INTRAMUSCULAR; INTRAVENOUS; SUBCUTANEOUS at 08:56

## 2018-08-09 RX ADMIN — Medication 50 MILLIGRAM(S): at 18:02

## 2018-08-09 RX ADMIN — Medication 325 MILLIGRAM(S): at 05:46

## 2018-08-09 RX ADMIN — Medication 975 MILLIGRAM(S): at 06:45

## 2018-08-09 NOTE — DISCHARGE NOTE ADULT - NS AS ACTIVITY OBS
Stairs allowed/Walking-Indoors allowed/Walking-Outdoors allowed/Do not drive or operate machinery/No Heavy lifting/straining/Showering allowed Showering allowed/Stairs allowed/Walking-Indoors allowed/Walking-Outdoors allowed/No Heavy lifting/straining/Do not make important decisions/Do not drive or operate machinery

## 2018-08-09 NOTE — CONSULT NOTE ADULT - SUBJECTIVE AND OBJECTIVE BOX
63 admitted for elective revision of right TKR. Seen on 1-E, NAD. Denies, HA, CP, SOB, fevers, leg edema or abdominal pain.     Remaining ROS WNL.     PMH:     PSH:     Social:     PHYSICAL EXAMINATION:  Vital Signs Last 24 Hrs  T(C): 36.1 (09 Aug 2018 11:16), Max: 36.4 (09 Aug 2018 00:30)  T(F): 96.9 (09 Aug 2018 11:16), Max: 97.6 (09 Aug 2018 00:30)  HR: 71 (09 Aug 2018 13:18) (50 - 84)  BP: 125/73 (09 Aug 2018 13:18) (100/69 - 158/84)  BP(mean): --  RR: 16 (09 Aug 2018 13:18) (12 - 17)  SpO2: 99% (09 Aug 2018 13:18) (96% - 100%)  CAPILLARY BLOOD GLUCOSE          GENERAL: NAD, well-groomed, well-developed  HEAD:  atraumatic, normocephalic  EYES: sclera anicteric  ENMT: mucous membranes moist  NECK: supple, No JVD  CHEST/LUNG: clear to auscultation bilaterally; no rales, rhonchi, or wheezing b/l  HEART: normal S1, S2  ABDOMEN: BS+, soft, ND, NT   EXTREMITIES:  pulses palpable; no clubbing, cyanosis, or edema b/l LEs  NEURO: awake, alert, interactive; moves all extremities  SKIN: no rashes or lesions          LABS:                        13.3   12.78 )-----------( 193      ( 09 Aug 2018 06:41 )             41.3     08-09    139  |  105  |  28<H>  ----------------------------<  158<H>  4.4   |  24  |  1.35<H>    Ca    8.5      09 Aug 2018 06:38 63 admitted for elective revision of right TKR. Had infection in prior right TKR. Had knee joint removed, IV ABX for 6 weeks and today     had new right TKR.  Seen on 1-E, NAD. Denies, HA, CP, SOB, fevers, leg edema or abdominal pain.     Remaining ROS WNL.     PMH: BPH, HLD, HTN    PSH: Left TKR    Social: no smoking, ETOH or IVDA     PHYSICAL EXAMINATION:  Vital Signs Last 24 Hrs  T(C): 36.1 (09 Aug 2018 11:16), Max: 36.4 (09 Aug 2018 00:30)  T(F): 96.9 (09 Aug 2018 11:16), Max: 97.6 (09 Aug 2018 00:30)  HR: 71 (09 Aug 2018 13:18) (50 - 84)  BP: 125/73 (09 Aug 2018 13:18) (100/69 - 158/84)  BP(mean): --  RR: 16 (09 Aug 2018 13:18) (12 - 17)  SpO2: 99% (09 Aug 2018 13:18) (96% - 100%)  CAPILLARY BLOOD GLUCOSE          GENERAL: NAD, well-groomed, well-developed, comfortable on 1 E   HEAD:  atraumatic, normocephalic  EYES: sclera anicteric  ENMT: mucous membranes moist  NECK: supple, No JVD  CHEST/LUNG: clear to auscultation bilaterally; no rales, rhonchi, or wheezing b/l  HEART: normal S1, S2  ABDOMEN: BS+, soft, ND, NT   EXTREMITIES:  pulses palpable; no clubbing, cyanosis, or edema b/l LEs  NEURO: awake, alert, interactive; moves all extremities  SKIN: no rashes or lesions          LABS:                        13.3   12.78 )-----------( 193      ( 09 Aug 2018 06:41 )             41.3     08-09    139  |  105  |  28<H>  ----------------------------<  158<H>  4.4   |  24  |  1.35<H>    Ca    8.5      09 Aug 2018 06:38

## 2018-08-09 NOTE — OCCUPATIONAL THERAPY INITIAL EVALUATION ADULT - PLANNED THERAPY INTERVENTIONS, OT EVAL
neuromuscular re-education/parent/caregiver training.../strengthening/balance training/ROM/energy conservation techniques/stretching/transfer training/ADL retraining/bed mobility training/IADL retraining

## 2018-08-09 NOTE — PHYSICAL THERAPY INITIAL EVALUATION ADULT - ADDITIONAL COMMENTS
Pt used a straight cane for ambulation prior to admission. Pt has one step to get into house and no stairs at home. Pt has a Rolling Walker and straight cane. Pt declined commode.

## 2018-08-09 NOTE — DISCHARGE NOTE ADULT - NSFTFSERV1RD_GEN_ALL_CORE
rehabilitation services/wound care and assessment observation and assessment/medication teaching and assessment/wound care and assessment/rehabilitation services

## 2018-08-09 NOTE — DISCHARGE NOTE ADULT - CARE PROVIDER_API CALL
Salazar Alonzo), Orthopaedic Surgery  [address] [place] [address]  Phone: [phone]  Fax: [phone]

## 2018-08-09 NOTE — DISCHARGE NOTE ADULT - MEDICATION SUMMARY - MEDICATIONS TO TAKE
I will START or STAY ON the medications listed below when I get home from the hospital:    acetaminophen 325 mg oral tablet  -- 3 tab(s) by mouth every 8 hours as needed for pain   -- Indication: For pain control    oxyCODONE 5 mg oral tablet  -- 1 tab(s) by mouth every 4 hours, As needed for pain MDD:6 tablets  -- Indication: For pain control    aspirin 325 mg oral delayed release tablet  -- 1 tab(s) by mouth every 12 hours to prevent blood clots MDD:2 tablets  -- Indication: For to prevent blood clots     celecoxib 200 mg oral capsule  -- 1 cap(s) by mouth every 12 hours MDD:2 tablets  -- Indication: For pain control    tamsulosin 0.4 mg oral capsule  -- 1 cap(s) by mouth once a day  -- Indication: For REsume home medication    atorvastatin 10 mg oral tablet  -- 1 tab(s) by mouth once a day  -- Indication: For REsume home medication    Metoprolol Tartrate 50 mg oral tablet  -- 1 tab(s) by mouth 2 times a day  -- Indication: For REsume home medication    bisacodyl 10 mg rectal suppository  -- 1 suppository(ies) rectally once a day, As needed, If no bowel movement by postoperative day #2  -- Indication: For to treat constipation    docusate sodium 100 mg oral capsule  -- 1 cap(s) by mouth 3 times a day  -- Indication: For to prevent constipation    polyethylene glycol 3350 oral powder for reconstitution  -- 17 gram(s) by mouth once a day  -- Indication: For to prevent constipation    pantoprazole 40 mg oral delayed release tablet  -- 1 tab(s) by mouth once a day MDD:1 tablet  -- Indication: For to protect the lining of your stomach    Multiple Vitamins oral tablet  -- 1 tab(s) by mouth once a day  -- Indication: For wound healing    ascorbic acid 500 mg oral tablet  -- 1 tab(s) by mouth 2 times a day  -- Indication: For wound healing    folic acid 1 mg oral tablet  -- 1 tab(s) by mouth once a day  -- Indication: For wound healing I will START or STAY ON the medications listed below when I get home from the hospital:    acetaminophen 325 mg oral tablet  -- 3 tab(s) by mouth every 8 hours as needed for pain   -- Indication: For pain control    oxyCODONE 5 mg oral tablet  -- 1 tab(s) by mouth every 4 hours, As needed for pain MDD:6 tablets  -- Indication: For pain control    aspirin 325 mg oral delayed release tablet  -- 1 tab(s) by mouth every 12 hours to prevent blood clots MDD:2 tablets  -- Indication: For to prevent blood clots     tamsulosin 0.4 mg oral capsule  -- 1 cap(s) by mouth once a day  -- Indication: For REsume home medication    atorvastatin 10 mg oral tablet  -- 1 tab(s) by mouth once a day  -- Indication: For REsume home medication    Metoprolol Tartrate 50 mg oral tablet  -- 1 tab(s) by mouth 2 times a day  -- Indication: For REsume home medication    bisacodyl 10 mg rectal suppository  -- 1 suppository(ies) rectally once a day, As needed, If no bowel movement by postoperative day #2  -- Indication: For to treat constipation    docusate sodium 100 mg oral capsule  -- 1 cap(s) by mouth 3 times a day  -- Indication: For to prevent constipation    polyethylene glycol 3350 oral powder for reconstitution  -- 17 gram(s) by mouth once a day  -- Indication: For to prevent constipation    pantoprazole 40 mg oral delayed release tablet  -- 1 tab(s) by mouth once a day MDD:1 tablet  -- Indication: For to protect the lining of your stomach    Multiple Vitamins oral tablet  -- 1 tab(s) by mouth once a day  -- Indication: For wound healing    ascorbic acid 500 mg oral tablet  -- 1 tab(s) by mouth 2 times a day  -- Indication: For wound healing    folic acid 1 mg oral tablet  -- 1 tab(s) by mouth once a day  -- Indication: For wound healing I will START or STAY ON the medications listed below when I get home from the hospital:    acetaminophen 325 mg oral tablet  -- 3 tab(s) by mouth every 8 hours as needed for pain   -- Indication: For pain control    oxyCODONE 5 mg oral tablet  -- 1 tab(s) by mouth every 4 hours, As needed for pain MDD:6 tablets  -- Indication: For pain control    aspirin 325 mg oral delayed release tablet  -- 1 tab(s) by mouth every 12 hours to prevent blood clots MDD:2 tablets  -- Indication: For to prevent blood clots     tamsulosin 0.4 mg oral capsule  -- 1 cap(s) by mouth once a day  -- Indication: For REsume home medication    atorvastatin 10 mg oral tablet  -- 1 tab(s) by mouth once a day  -- Indication: For REsume home medication    Metoprolol Tartrate 50 mg oral tablet  -- 1 tab(s) by mouth 2 times a day  -- Indication: For REsume home medication    bisacodyl 10 mg rectal suppository  -- 1 suppository(ies) rectally once a day, As needed, If no bowel movement by postoperative day #2  -- Indication: For to treat constipation    docusate sodium 100 mg oral capsule  -- 1 cap(s) by mouth 3 times a day  -- Indication: For to prevent constipation    polyethylene glycol 3350 oral powder for reconstitution  -- 17 gram(s) by mouth once a day  -- Indication: For to prevent constipation    pantoprazole 40 mg oral delayed release tablet  -- 1 tab(s) by mouth once a day MDD:1 tablet  -- Indication: For to protect the lining of your stomach    Multiple Vitamins oral tablet  -- 1 tab(s) by mouth once a day  -- Indication: For wound healing    ascorbic acid 500 mg oral tablet  -- 1 tab(s) by mouth 2 times a day  -- Indication: For wound healing    folic acid 1 mg oral tablet  -- 1 tab(s) by mouth once a day MDD:1 tablet  -- Indication: For wound healing

## 2018-08-09 NOTE — DISCHARGE NOTE ADULT - PATIENT PORTAL LINK FT
You can access the [email or web address], offered by [facility], by registering with the following website: [email or web address]

## 2018-08-09 NOTE — OCCUPATIONAL THERAPY INITIAL EVALUATION ADULT - SOCIAL CONCERNS
Pt voiced concerns about his recovery at home. Pt has the support of her spouse to assist him after discharge home post-operatively./Complex psychosocial needs/coping issues

## 2018-08-09 NOTE — DISCHARGE NOTE ADULT - MEDICATION SUMMARY - MEDICATIONS TO STOP TAKING
I will STOP taking the medications listed below when I get home from the hospital:    Garlic oral tablet  -- 1 tab(s) by mouth once a day    Omega-3 Fish oil  1000 mg  -- 1 cap(s) by mouth once a day    acetaminophen 325 mg oral tablet  -- 2 tab(s) by mouth every 6 hours, As needed, For Temp over 38.3 C (100.94 F)    Aspir 81 oral delayed release tablet  -- 1 tab(s) by mouth once a day

## 2018-08-09 NOTE — CONSULT NOTE ADULT - ASSESSMENT
63 admitted for elective revision of right TKR.    Plan: 63 admitted for elective revision of right TKR.    Plan: Continue Lipitor 10 mg/day for HLD, Lopressor 50 mg bid for HTN and Flomax .4 mg/day    for BPH. Agree with  mg bid for DVT prevention for 30 days post-op.     CBC and SMA-7 all WNL. PT and ambulate as tolerated.

## 2018-08-09 NOTE — OCCUPATIONAL THERAPY INITIAL EVALUATION ADULT - GENERAL OBSERVATIONS, REHAB EVAL
Pt was seen for initial OT consult, encountered OOB to chair on ; pt was AA&Ox4, cooperative & followed commands. Pt c/o right knee pain due to s/p TKR; this limits pt's activity tolerance ,balance, ADL management and functional mobility.

## 2018-08-09 NOTE — DISCHARGE NOTE ADULT - ADDITIONAL INSTRUCTIONS
Please call your MD, if you have new onset of fevers, increased drainage, increased pain or increased redness around the incision site. Please return to the Emergency Department if you have chest pain or shortness of breath. Follow up with Dr. Alonzo in 2 weeks. Please call [phone] for appointment.      Please call your MD, if you have new onset of fevers, increased drainage, increased pain or increased redness around the incision site. Please return to the Emergency Department if you have chest pain or shortness of breath.

## 2018-08-09 NOTE — OCCUPATIONAL THERAPY INITIAL EVALUATION ADULT - RANGE OF MOTION EXAMINATION, LOWER EXTREMITY
AAROM in right knee is 0-45 degrees/Left LE Active ROM was WNL  (within normal limits)/Left LE Passive ROM was WNL (within normal limits)

## 2018-08-09 NOTE — PHYSICAL THERAPY INITIAL EVALUATION ADULT - IMPAIRMENTS FOUND, PT EVAL
gait, locomotion, and balance/aerobic capacity/endurance/muscle strength/ergonomics and body mechanics/posture/ROM

## 2018-08-09 NOTE — DISCHARGE NOTE ADULT - PLAN OF CARE
Improve Function,  Pain Keep Prineo Dressing Clean, Dry and Intact. May shower with Prineo Dressing. Please do not scrub, soak, peel or pick at the prineo dressing. No creams, lotions, or oils over dressing. May shower and let water run over incision, no baths. Pat dry once out of shower. Dressing to be removed in office at follow up visit in 2 weeks.

## 2018-08-09 NOTE — OCCUPATIONAL THERAPY INITIAL EVALUATION ADULT - DEEP PRESSURE SENSATION, LUE, REHAB EVAL
Chief Complaint   Patient presents with   • Sinus Problem     x 2 weeks c/o nasal congestion with drainage and facial pressure and headache.   • Ear Problem     x 2 weeks c/o bilateral ear pain and pressure.Pt reports he was seen at walk in clinic on 12/01/17 and recived ABT and cough syrup but not seeing any improvements in the symptoms.   • Cough     x 2 weeks c/o productive cough with grrenish secretions.         HISTORY OF PRESENT ILLNESS:   The patient is a 34 year old male who presents with complaints of cold, congestion, runny nose, cough, ear pain. This been going on for 2 weeks. Denies any nausea, vomiting, chest pain, shortness breath, wheezing. No fever no chills.    PAST MEDICAL HISTORY, PAST SURGICAL HISTORY, SOCIAL HISTORY, MEDICATIONS, AND ALLERGIES:  Reviewed per electronic chart.    REVIEW OF SYSTEMS:  Constitutional:  Denies fever or chills   Eyes:  Denies change in visual acuity   Respiratory:  Per History of Present Illness   Cardiovascular:  Denies chest pain or edema   Gastrointestinal:  Denies abdominal pain, nausea, vomiting, bloody stools or diarrhea   Genitourinary:  Denies dysuria   Musculoskeletal:  Denies back pain or joint pain   Integument:  Denies rash       PHYSICAL EXAM:  Vitals:  Vitals:    12/06/17 1736   BP: (!) 138/91   Pulse: 97   Resp: 20   Temp: 99.1 °F (37.3 °C)     Constitutional:  No acute distress, nontoxic appearance.  HENT: Normocephalic, atraumatic. Bilateral external ears normal. Oropharynx moist. No oral exudates. Nose inflamed. Mucous membranes with swollen turbinates. Serous fluid present behind the bilateral tympanic membranes. Left tympanic membranes is inflamed, right no inflammation. Patient does have a perforation in the left side inferior quadrant small, which is chronic  Eyes:  PERRLA (pupils equal, round, and reactive to light and accommodation), EOMI (extraocular movements are intact). Conjunctivae normal. No discharge.  Neck:  Normal range of motion. No  tenderness. Supple. No lymphadenopathy. No stridor.  Cardiovascular:  Normal heart rate. Normal rhythm. No murmurs. No rubs. No gallops.  Pulmonary/Chest:  Normal breath sounds. No respiratory distress. No wheezing. No chest tenderness.  Abdomen:  Bowel sounds normal. Soft. No tenderness. No masses. No pulsatile masses.    Lab/Radiology:  Orders Placed This Encounter   • cefdinir (OMNICEF) 300 MG capsule       ASSESSMENT:   Encounter Diagnoses   Name Primary?   • Left otitis media with spontaneous rupture of eardrum Yes   • URI, acute        PLAN: Symptomatic care reviewed Omnicef for 10 days. Follow-up with primary    Follow up with primary if no improvement of symptoms or worsening. Patient acknowledged understanding of the instructions.     within normal limits

## 2018-08-09 NOTE — DISCHARGE NOTE ADULT - PAIN PRESENT
No/Take your medications exactly as prescribed. Having your pain under control will help increase activity, improve deep breathing and coughing and prevent complications like pneumonia and blood clots in your legs. Some of the common side effects of pain medications are nausea, vomiting, itching, rash and upset stomach. Contact your doctor if you develop these or any other unusual systoms. Eat a diet rich in fiber and drink plenty of oral fluids. Use other pain management methods like, cold and warm applications, elevation of affected body part, listening to music, watching TV, yoga, etc.

## 2018-08-09 NOTE — OCCUPATIONAL THERAPY INITIAL EVALUATION ADULT - ANTICIPATED DISCHARGE DISPOSITION, OT EVAL
Recommend home with OT referral to enable patient to safely perform ADL management and functional mobility. Pt owns all the necessary DME.

## 2018-08-09 NOTE — PROGRESS NOTE ADULT - SUBJECTIVE AND OBJECTIVE BOX
POD#1 s/p Right TKA Revision   63yMale Patient seen and examined with Dr. Alonzo, Pain controlled  Patient Denies SOB, CP, N/V/D       PE: Right Knee/LE: Dressing C/D/I, Sensation/motor intact, DP 2+, FROM ankle/toes   B/L LE: Skin intact. +ROM hip/knee/ankle/toes. Ankle Dorsi/plantarflexion: 5/5. Calf: soft, compressible and nontender. DP/PT 2+ NVI                          13.3   12.78 )-----------( 193      ( 09 Aug 2018 06:41 )             41.3       08-09    139  |  105  |  28<H>  ----------------------------<  158<H>  4.4   |  24  |  1.35<H>    Ca    8.5      09 Aug 2018 06:38          A: As above   P: Pain Control       DVT Prophylaxis      Incentive spirometry      PT WBAT RLE      Isometric exercises      Discharge Planning      All the above discussed and understood by pt       Ortho to F/U

## 2018-08-09 NOTE — OCCUPATIONAL THERAPY INITIAL EVALUATION ADULT - PERTINENT HX OF CURRENT PROBLEM, REHAB EVAL
Pt was admitted d for elective surgery for  revision of right TKR, due to OA, pain and DJD. Pt has a history of multiple comorbidities. Pt was admitted for elective surgery for  revision of right TKR, due to OA, pain and DJD. Pt has a history of multiple comorbidities.

## 2018-08-09 NOTE — PHYSICAL THERAPY INITIAL EVALUATION ADULT - IMPAIRMENTS CONTRIBUTING TO GAIT DEVIATIONS, PT EVAL
impaired balance/decreased flexibility/impaired postural control/decreased ROM/pain/decreased strength

## 2018-08-09 NOTE — OCCUPATIONAL THERAPY INITIAL EVALUATION ADULT - ADDITIONAL COMMENTS
Prior to  admission pt was  functioning in his  roles, self sufficient, driving & ambulating independently in the community without  a single axis cane. Presently , pt needs supervision with self care tasks and functional ambulation with rolling walker /WBAT. The scale below depicts a picture of the pt's current level of functioning. Barthel Index: Feeding Score__10____, Bathing Score___0___, Grooming Score___10__, Dressing Score___5__, Bowel Score___10__, Bladder Score__10____, Toilet Score___10__, Transfer Score___10___, Mobility Score____10_, Stairs Score__5___, Total Score___80/100__. Prior to admission, pt was functioning in his  roles, self sufficient, driving & ambulating independently in the community without a single axis cane. Presently, pt needs supervision with self care tasks and functional ambulation with rolling walker /WBAT. The scale below depicts a picture of the pt's current level of functioning. Barthel Index: Feeding Score__10____, Bathing Score___0___, Grooming Score___10__, Dressing Score___5__, Bowel Score___10__, Bladder Score__10____, Toilet Score___10__, Transfer Score___10___, Mobility Score____10_, Stairs Score__5___, Total Score___80/100__.

## 2018-08-09 NOTE — DISCHARGE NOTE ADULT - HOSPITAL COURSE
63yMale with history of RIght TKA presenting for Right TKA Revision by Dr. Alonzo, on 8/9/18. Risk and benefits of surgery were explained to the patient. The patient understood and agreed to proceed with surgery. Patient underwent the procedure with no intraoperative complications. Pt was brought in stable condition to the PACU. Once stable in PACU, pt was brought to the floor. During hospital stay pt was followed by Medicine, Pt had an uneventful hospital course. Pt is stable for discharge to Home with Home Care Services and PT

## 2018-08-09 NOTE — PHYSICAL THERAPY INITIAL EVALUATION ADULT - CRITERIA FOR SKILLED THERAPEUTIC INTERVENTIONS
impairments found/therapy frequency/anticipated discharge recommendation/rehab potential/risk reduction/prevention/predicted duration of therapy intervention/functional limitations in following categories

## 2018-08-09 NOTE — DISCHARGE NOTE ADULT - CARE PLAN
Principal Discharge DX:	Status post right knee replacement  Goal:	Improve Function,  Pain  Assessment and plan of treatment:	Keep Prineo Dressing Clean, Dry and Intact. May shower with Prineo Dressing. Please do not scrub, soak, peel or pick at the prineo dressing. No creams, lotions, or oils over dressing. May shower and let water run over incision, no baths. Pat dry once out of shower. Dressing to be removed in office at follow up visit in 2 weeks.

## 2018-08-10 VITALS
OXYGEN SATURATION: 98 % | RESPIRATION RATE: 16 BRPM | SYSTOLIC BLOOD PRESSURE: 131 MMHG | HEART RATE: 64 BPM | TEMPERATURE: 96 F | DIASTOLIC BLOOD PRESSURE: 81 MMHG

## 2018-08-10 LAB
ANION GAP SERPL CALC-SCNC: 8 MMOL/L — SIGNIFICANT CHANGE UP (ref 5–17)
BUN SERPL-MCNC: 25 MG/DL — HIGH (ref 7–23)
CALCIUM SERPL-MCNC: 8.4 MG/DL — LOW (ref 8.5–10.1)
CHLORIDE SERPL-SCNC: 108 MMOL/L — SIGNIFICANT CHANGE UP (ref 96–108)
CO2 SERPL-SCNC: 27 MMOL/L — SIGNIFICANT CHANGE UP (ref 22–31)
CREAT SERPL-MCNC: 0.97 MG/DL — SIGNIFICANT CHANGE UP (ref 0.5–1.3)
GLUCOSE SERPL-MCNC: 130 MG/DL — HIGH (ref 70–99)
HCT VFR BLD CALC: 41.4 % — SIGNIFICANT CHANGE UP (ref 39–50)
HGB BLD-MCNC: 13.1 G/DL — SIGNIFICANT CHANGE UP (ref 13–17)
MCHC RBC-ENTMCNC: 28.1 PG — SIGNIFICANT CHANGE UP (ref 27–34)
MCHC RBC-ENTMCNC: 31.6 GM/DL — LOW (ref 32–36)
MCV RBC AUTO: 88.8 FL — SIGNIFICANT CHANGE UP (ref 80–100)
NRBC # BLD: 0 /100 WBCS — SIGNIFICANT CHANGE UP (ref 0–0)
PLATELET # BLD AUTO: 177 K/UL — SIGNIFICANT CHANGE UP (ref 150–400)
POTASSIUM SERPL-MCNC: 4.3 MMOL/L — SIGNIFICANT CHANGE UP (ref 3.5–5.3)
POTASSIUM SERPL-SCNC: 4.3 MMOL/L — SIGNIFICANT CHANGE UP (ref 3.5–5.3)
RBC # BLD: 4.66 M/UL — SIGNIFICANT CHANGE UP (ref 4.2–5.8)
RBC # FLD: 15.7 % — HIGH (ref 10.3–14.5)
SODIUM SERPL-SCNC: 143 MMOL/L — SIGNIFICANT CHANGE UP (ref 135–145)
SURGICAL PATHOLOGY FINAL REPORT - CH: SIGNIFICANT CHANGE UP
WBC # BLD: 15.08 K/UL — HIGH (ref 3.8–10.5)
WBC # FLD AUTO: 15.08 K/UL — HIGH (ref 3.8–10.5)

## 2018-08-10 RX ORDER — GARLIC 1000 MG
1 CAPSULE ORAL
Qty: 0 | Refills: 0 | COMMUNITY

## 2018-08-10 RX ORDER — DOCUSATE SODIUM 100 MG
1 CAPSULE ORAL
Qty: 0 | Refills: 0 | COMMUNITY
Start: 2018-08-10

## 2018-08-10 RX ORDER — CELECOXIB 200 MG/1
200 CAPSULE ORAL EVERY 12 HOURS
Qty: 0 | Refills: 0 | Status: DISCONTINUED | OUTPATIENT
Start: 2018-08-10 | End: 2018-08-10

## 2018-08-10 RX ORDER — FOLIC ACID 0.8 MG
1 TABLET ORAL
Qty: 30 | Refills: 0 | OUTPATIENT
Start: 2018-08-10 | End: 2018-09-08

## 2018-08-10 RX ORDER — CELECOXIB 200 MG/1
1 CAPSULE ORAL
Qty: 60 | Refills: 0 | OUTPATIENT
Start: 2018-08-10 | End: 2018-09-08

## 2018-08-10 RX ORDER — PANTOPRAZOLE SODIUM 20 MG/1
1 TABLET, DELAYED RELEASE ORAL
Qty: 30 | Refills: 0 | OUTPATIENT
Start: 2018-08-10 | End: 2018-09-08

## 2018-08-10 RX ORDER — ASPIRIN/CALCIUM CARB/MAGNESIUM 324 MG
1 TABLET ORAL
Qty: 60 | Refills: 0 | OUTPATIENT
Start: 2018-08-10 | End: 2018-09-08

## 2018-08-10 RX ORDER — ASCORBIC ACID 60 MG
1 TABLET,CHEWABLE ORAL
Qty: 0 | Refills: 0 | COMMUNITY
Start: 2018-08-10

## 2018-08-10 RX ORDER — FOLIC ACID 0.8 MG
1 TABLET ORAL
Qty: 0 | Refills: 0 | COMMUNITY
Start: 2018-08-10

## 2018-08-10 RX ORDER — ASPIRIN/CALCIUM CARB/MAGNESIUM 324 MG
1 TABLET ORAL
Qty: 0 | Refills: 0 | COMMUNITY
Start: 2018-08-10 | End: 2018-09-08

## 2018-08-10 RX ORDER — ASPIRIN/CALCIUM CARB/MAGNESIUM 324 MG
1 TABLET ORAL
Qty: 0 | Refills: 0 | COMMUNITY

## 2018-08-10 RX ORDER — OXYCODONE HYDROCHLORIDE 5 MG/1
1 TABLET ORAL
Qty: 30 | Refills: 0 | OUTPATIENT
Start: 2018-08-10 | End: 2018-08-14

## 2018-08-10 RX ORDER — POLYETHYLENE GLYCOL 3350 17 G/17G
17 POWDER, FOR SOLUTION ORAL
Qty: 0 | Refills: 0 | COMMUNITY
Start: 2018-08-10

## 2018-08-10 RX ORDER — ACETAMINOPHEN 500 MG
3 TABLET ORAL
Qty: 0 | Refills: 0 | COMMUNITY
Start: 2018-08-10

## 2018-08-10 RX ADMIN — Medication 1 TABLET(S): at 11:34

## 2018-08-10 RX ADMIN — Medication 50 MILLIGRAM(S): at 05:53

## 2018-08-10 RX ADMIN — Medication 975 MILLIGRAM(S): at 06:30

## 2018-08-10 RX ADMIN — Medication 500 MILLIGRAM(S): at 05:53

## 2018-08-10 RX ADMIN — Medication 975 MILLIGRAM(S): at 05:53

## 2018-08-10 RX ADMIN — Medication 1 MILLIGRAM(S): at 11:35

## 2018-08-10 RX ADMIN — PANTOPRAZOLE SODIUM 40 MILLIGRAM(S): 20 TABLET, DELAYED RELEASE ORAL at 11:34

## 2018-08-10 RX ADMIN — Medication 325 MILLIGRAM(S): at 05:53

## 2018-08-10 NOTE — PROGRESS NOTE ADULT - SUBJECTIVE AND OBJECTIVE BOX
63yMale s/p R TKA revision POD#2. Pt seen and examined in NAD. Pain controlled. Pt denies any new complaints. Pt denies CP/SOB/N/V/D/numbness/tingling/bowel or bladder dysfunction.     PE:   RLE: dressing c/d/i. +ROM ankle/toes. Calf: soft, compressible and nontender. DP/PT 2+ NVI.                           13.3   12.78 )-----------( 193      ( 09 Aug 2018 06:41 )             41.3       08-09    139  |  105  |  28<H>  ----------------------------<  158<H>  4.4   |  24  |  1.35<H>    Ca    8.5      09 Aug 2018 06:38          A/P: 63yMale s/p R TKA revision POD#2  Pain controlled  PT: WBAT   DVT ppx: SCDs/ASA 325mg BID    Wound care, Isometric exercises, incentive spirometry   Discharge: planning for home   All the above discussed and understood by pt 63yMale s/p R TKA revision POD#2. Pt seen and examined in NAD. Pain controlled. Pt denies any new complaints. Pt denies CP/SOB/N/V/D/numbness/tingling/bowel or bladder dysfunction.     PE:   RLE: dressing c/d/i. +ROM ankle/toes. Calf: soft, compressible and nontender. DP/PT 2+ NVI.                           13.3   12.78 )-----------( 193      ( 09 Aug 2018 06:41 )             41.3       08-09    139  |  105  |  28<H>  ----------------------------<  158<H>  4.4   |  24  |  1.35<H>    Ca    8.5      09 Aug 2018 06:38          A/P: 63yMale s/p R TKA revision POD#2  Creatinine improved and stable  Pain controlled  PT: WBAT   DVT ppx: SCDs/ASA 325mg BID    Wound care, Isometric exercises, incentive spirometry   Discharge: planning for home   All the above discussed and understood by pt 63yMale s/p R TKA revision POD#2. Pt seen and examined in NAD. Pain controlled. Pt denies any new complaints. Pt denies CP/SOB/N/V/D/numbness/tingling/bowel or bladder dysfunction.     PE:   RLE: dressing c/d/i. +ROM ankle/toes. Calf: soft, compressible and nontender. DP/PT 2+ NVI.                           13.3   12.78 )-----------( 193      ( 09 Aug 2018 06:41 )             41.3       08-09    139  |  105  |  28<H>  ----------------------------<  158<H>  4.4   |  24  |  1.35<H>    Ca    8.5      09 Aug 2018 06:38          A/P: 63yMale s/p R TKA revision POD#2  Creatinine improved and stable  Pain controlled  PT: WBAT   DVT ppx: SCDs/ASA 325mg BID    Wound care, Isometric exercises, incentive spirometry   Discharge: planning for home   Discussed with Dr. Alonzo - pt start of home care on Sunday 8/12/18 which is ok with Dr. Alonzo.   All the above discussed and understood by pt

## 2018-08-17 DIAGNOSIS — Z47.33 AFTERCARE FOLLOWING EXPLANTATION OF KNEE JOINT PROSTHESIS: ICD-10-CM

## 2018-10-26 NOTE — PHYSICAL THERAPY INITIAL EVALUATION ADULT - LIVES WITH, PROFILE
Food & Nutrition  Education    Diet Education:   Cardiac diet  Time Spent:  20 minutes  Learners:  Patient, parents      Nutrition Education provided with handouts:  Low sodium diet      Comments:  Pt buys most of her meals. Provided low sodium diet education with emphasis on strategies for making low sodium choices at restaurants. Pt verbally acknowledged.      All questions and concerns answered. Dietitian's contact information provided.       Follow-Up:  11/2/18    Please Re-consult as needed  Sonja Mejias RD      Thanks!       spouse

## 2018-11-07 NOTE — H&P PST ADULT - HISTORY OF PRESENT ILLNESS
63M [facility] ___ here for PST for scheduled No 63M [facility] htn, afib, hl, bph, divewrticulosis here for PST for scheduled Revision right total knee arthroplasty

## 2019-03-11 ENCOUNTER — OUTPATIENT (OUTPATIENT)
Dept: OUTPATIENT SERVICES | Facility: HOSPITAL | Age: 64
LOS: 1 days | End: 2019-03-11
Payer: COMMERCIAL

## 2019-03-11 VITALS
WEIGHT: 202.83 LBS | DIASTOLIC BLOOD PRESSURE: 87 MMHG | HEIGHT: 66 IN | HEART RATE: 59 BPM | SYSTOLIC BLOOD PRESSURE: 137 MMHG | RESPIRATION RATE: 16 BRPM | TEMPERATURE: 97 F

## 2019-03-11 DIAGNOSIS — E78.00 PURE HYPERCHOLESTEROLEMIA, UNSPECIFIED: ICD-10-CM

## 2019-03-11 DIAGNOSIS — Z86.010 PERSONAL HISTORY OF COLONIC POLYPS: ICD-10-CM

## 2019-03-11 DIAGNOSIS — Z98.890 OTHER SPECIFIED POSTPROCEDURAL STATES: Chronic | ICD-10-CM

## 2019-03-11 DIAGNOSIS — M79.605 PAIN IN LEFT LEG: Chronic | ICD-10-CM

## 2019-03-11 DIAGNOSIS — S72.92XA UNSPECIFIED FRACTURE OF LEFT FEMUR, INITIAL ENCOUNTER FOR CLOSED FRACTURE: Chronic | ICD-10-CM

## 2019-03-11 DIAGNOSIS — Z96.659 PRESENCE OF UNSPECIFIED ARTIFICIAL KNEE JOINT: Chronic | ICD-10-CM

## 2019-03-11 DIAGNOSIS — I10 ESSENTIAL (PRIMARY) HYPERTENSION: ICD-10-CM

## 2019-03-11 DIAGNOSIS — Z01.818 ENCOUNTER FOR OTHER PREPROCEDURAL EXAMINATION: ICD-10-CM

## 2019-03-11 DIAGNOSIS — Z29.9 ENCOUNTER FOR PROPHYLACTIC MEASURES, UNSPECIFIED: ICD-10-CM

## 2019-03-11 DIAGNOSIS — I48.0 PAROXYSMAL ATRIAL FIBRILLATION: ICD-10-CM

## 2019-03-11 LAB
ANION GAP SERPL CALC-SCNC: 11 MMOL/L — SIGNIFICANT CHANGE UP (ref 5–17)
APTT BLD: 29.3 SEC — SIGNIFICANT CHANGE UP (ref 27.5–36.3)
BASOPHILS # BLD AUTO: 0 K/UL — SIGNIFICANT CHANGE UP (ref 0–0.2)
BASOPHILS NFR BLD AUTO: 0.5 % — SIGNIFICANT CHANGE UP (ref 0–2)
BUN SERPL-MCNC: 19 MG/DL — SIGNIFICANT CHANGE UP (ref 8–20)
CALCIUM SERPL-MCNC: 9.8 MG/DL — SIGNIFICANT CHANGE UP (ref 8.6–10.2)
CHLORIDE SERPL-SCNC: 100 MMOL/L — SIGNIFICANT CHANGE UP (ref 98–107)
CO2 SERPL-SCNC: 26 MMOL/L — SIGNIFICANT CHANGE UP (ref 22–29)
CREAT SERPL-MCNC: 0.85 MG/DL — SIGNIFICANT CHANGE UP (ref 0.5–1.3)
EOSINOPHIL # BLD AUTO: 0.3 K/UL — SIGNIFICANT CHANGE UP (ref 0–0.5)
EOSINOPHIL NFR BLD AUTO: 3.6 % — SIGNIFICANT CHANGE UP (ref 0–5)
GLUCOSE SERPL-MCNC: 92 MG/DL — SIGNIFICANT CHANGE UP (ref 70–115)
HCT VFR BLD CALC: 48.5 % — SIGNIFICANT CHANGE UP (ref 42–52)
HGB BLD-MCNC: 15.9 G/DL — SIGNIFICANT CHANGE UP (ref 14–18)
INR BLD: 0.98 RATIO — SIGNIFICANT CHANGE UP (ref 0.88–1.16)
LYMPHOCYTES # BLD AUTO: 2 K/UL — SIGNIFICANT CHANGE UP (ref 1–4.8)
LYMPHOCYTES # BLD AUTO: 21.7 % — SIGNIFICANT CHANGE UP (ref 20–55)
MCHC RBC-ENTMCNC: 30.2 PG — SIGNIFICANT CHANGE UP (ref 27–31)
MCHC RBC-ENTMCNC: 32.8 G/DL — SIGNIFICANT CHANGE UP (ref 32–36)
MCV RBC AUTO: 92 FL — SIGNIFICANT CHANGE UP (ref 80–94)
MONOCYTES # BLD AUTO: 0.9 K/UL — HIGH (ref 0–0.8)
MONOCYTES NFR BLD AUTO: 10.2 % — HIGH (ref 3–10)
NEUTROPHILS # BLD AUTO: 5.9 K/UL — SIGNIFICANT CHANGE UP (ref 1.8–8)
NEUTROPHILS NFR BLD AUTO: 63.7 % — SIGNIFICANT CHANGE UP (ref 37–73)
PLATELET # BLD AUTO: 186 K/UL — SIGNIFICANT CHANGE UP (ref 150–400)
POTASSIUM SERPL-MCNC: 4.6 MMOL/L — SIGNIFICANT CHANGE UP (ref 3.5–5.3)
POTASSIUM SERPL-SCNC: 4.6 MMOL/L — SIGNIFICANT CHANGE UP (ref 3.5–5.3)
PROTHROM AB SERPL-ACNC: 11.3 SEC — SIGNIFICANT CHANGE UP (ref 10–12.9)
RBC # BLD: 5.27 M/UL — SIGNIFICANT CHANGE UP (ref 4.6–6.2)
RBC # FLD: 14.7 % — SIGNIFICANT CHANGE UP (ref 11–15.6)
SODIUM SERPL-SCNC: 137 MMOL/L — SIGNIFICANT CHANGE UP (ref 135–145)
WBC # BLD: 9.3 K/UL — SIGNIFICANT CHANGE UP (ref 4.8–10.8)
WBC # FLD AUTO: 9.3 K/UL — SIGNIFICANT CHANGE UP (ref 4.8–10.8)

## 2019-03-11 PROCEDURE — 85730 THROMBOPLASTIN TIME PARTIAL: CPT

## 2019-03-11 PROCEDURE — 80048 BASIC METABOLIC PNL TOTAL CA: CPT

## 2019-03-11 PROCEDURE — 85610 PROTHROMBIN TIME: CPT

## 2019-03-11 PROCEDURE — 85027 COMPLETE CBC AUTOMATED: CPT

## 2019-03-11 PROCEDURE — 93010 ELECTROCARDIOGRAM REPORT: CPT

## 2019-03-11 PROCEDURE — 36415 COLL VENOUS BLD VENIPUNCTURE: CPT

## 2019-03-11 PROCEDURE — 93005 ELECTROCARDIOGRAM TRACING: CPT

## 2019-03-11 RX ORDER — TAMSULOSIN HYDROCHLORIDE 0.4 MG/1
1 CAPSULE ORAL
Qty: 0 | Refills: 0 | COMMUNITY

## 2019-03-11 RX ORDER — METOPROLOL TARTRATE 50 MG
1 TABLET ORAL
Qty: 0 | Refills: 0 | COMMUNITY

## 2019-03-11 RX ORDER — ATORVASTATIN CALCIUM 80 MG/1
1 TABLET, FILM COATED ORAL
Qty: 0 | Refills: 0 | COMMUNITY

## 2019-03-11 RX ORDER — GARLIC 1000 MG
1 CAPSULE ORAL
Qty: 0 | Refills: 0 | COMMUNITY

## 2019-03-11 NOTE — H&P PST ADULT - PMH
BPH without urinary obstruction    HTN (hypertension)    Hypercholesteremia    Paroxysmal atrial fibrillation

## 2019-03-11 NOTE — H&P PST ADULT - PSH
Acute leg pain, left  s/p femur surgery - gisela  Femur fracture, left  repair  S/P inguinal hernia repair  Right  S/P knee replacement  bilateral

## 2019-03-11 NOTE — H&P PST ADULT - ASSESSMENT
OPIOID RISK TOOL    ELEANOR EACH BOX THAT APPLIES AND ADD TOTALS AT THE END    FAMILY HISTORY OF SUBSTANCE ABUSE                 FEMALE         MALE                                                Alcohol                             [  ]1 pt          [  ]3pts                                               Illegal Drugs                     [  ]2 pts        [  ]3pts                                               Rx Drugs                           [  ]4 pts        [  ]4 pts    PERSONAL HISTORY OF SUBSTANCE ABUSE                                                                                          Alcohol                             [  ]3 pts       [  ]3 pts                                               Illegal Drugs                     [  ]4 pts        [  ]4 pts                                               Rx Drugs                           [  ]5 pts        [  ]5 pts    AGE BETWEEN 16-45 YEARS                                      [  ]1 pt         [  ]1 pt    HISTORY OF PREADOLESCENT   SEXUAL ABUSE                                                             [  ]3 pts        [  ]0pts    PSYCHOLOGICAL DISEASE                     ADD, OCD, Bipolar, Schizophrenia        [  ]2 pts         [  ]2 pts                      Depression                                               [  ]1 pt           [  ]1 pt           SCORING TOTAL   (add numbers and type here)              (0)                                     A score of 3 or lower indicated LOW risk for future opioid abuse  A score of 4 to 7 indicated moderate risk for future opioid abuse  A score of 8 or higher indicates a high risk for opioid abuse Pt is a 63 y.o male with PMH of HTN, hypercholesteremia undergoing colonoscopy. Instructed to hold garlic tablet and multivitamins 1 week prior to surgery. Consult with cardiologist about holding aspirin. Take metoprolol am of surgery with sip of water.  LAUROI VTE 2.0 SCORE [CLOT updated 2019]    AGE RELATED RISK FACTORS                                                       MOBILITY RELATED FACTORS  [ ] Age 41-60 years                                            (1 Point)                    [ ] Bed rest                                                        (1 Point)  [ x] Age: 61-74 years                                           (2 Points)                  [ ] Plaster cast                                                   (2 Points)  [ ] Age= 75 years                                              (3 Points)                    [ ] Bed bound for more than 72 hours                 (2 Points)    DISEASE RELATED RISK FACTORS                                               GENDER SPECIFIC FACTORS  [ ] Edema in the lower extremities                       (1 Point)              [ ] Pregnancy                                                     (1 Point)  [ ] Varicose veins                                               (1 Point)                     [ ] Post-partum < 6 weeks                                   (1 Point)             [x ] BMI > 25 Kg/m2                                            (1 Point)                     [ ] Hormonal therapy  or oral contraception          (1 Point)                 [ ] Sepsis (in the previous month)                        (1 Point)               [ ] History of pregnancy complications                 (1 point)  [ ] Pneumonia or serious lung disease                                               [ ] Unexplained or recurrent                     (1 Point)           (in the previous month)                               (1 Point)  [ ] Abnormal pulmonary function test                     (1 Point)                 SURGERY RELATED RISK FACTORS  [ ] Acute myocardial infarction                              (1 Point)               [ ]  Section                                             (1 Point)  [ ] Congestive heart failure (in the previous month)  (1 Point)      [x ] Minor surgery                                                  (1 Point)   [ ] Inflammatory bowel disease                             (1 Point)               [ ] Arthroscopic surgery                                        (2 Points)  [ ] Central venous access                                      (2 Points)                [ ] General surgery lasting more than 45 minutes (2 points)  [ ] Malignancy- Present or previous                   (2 Points)                [ ] Elective arthroplasty                                         (5 points)    [ ] Stroke (in the previous month)                          (5 Points)                                                                                                                                                           HEMATOLOGY RELATED FACTORS                                                 TRAUMA RELATED RISK FACTORS  [ ] Prior episodes of VTE                                     (3 Points)                [ ] Fracture of the hip, pelvis, or leg                       (5 Points)  [ ] Positive family history for VTE                         (3 Points)             [ ] Acute spinal cord injury (in the previous month)  (5 Points)  [ ] Prothrombin 30511 A                                     (3 Points)               [ ] Paralysis  (less than 1 month)                             (5 Points)  [ ] Factor V Leiden                                             (3 Points)                  [ ] Multiple Trauma within 1 month                        (5 Points)  [ ] Lupus anticoagulants                                     (3 Points)                                                           [ ] Anticardiolipin antibodies                               (3 Points)                                                       [ ] High homocysteine in the blood                      (3 Points)                                             [ ] Other congenital or acquired thrombophilia      (3 Points)                                                [ ] Heparin induced thrombocytopenia                  (3 Points)                                     Total Score [  4        ]  OPIOID RISK TOOL    ELEANOR EACH BOX THAT APPLIES AND ADD TOTALS AT THE END    FAMILY HISTORY OF SUBSTANCE ABUSE                 FEMALE         MALE                                                Alcohol                             [  ]1 pt          [  ]3pts                                               Illegal Drugs                     [  ]2 pts        [  ]3pts                                               Rx Drugs                           [  ]4 pts        [  ]4 pts    PERSONAL HISTORY OF SUBSTANCE ABUSE                                                                                          Alcohol                             [  ]3 pts       [  ]3 pts                                               Illegal Drugs                     [  ]4 pts        [  ]4 pts                                               Rx Drugs                           [  ]5 pts        [  ]5 pts    AGE BETWEEN 16-45 YEARS                                      [  ]1 pt         [  ]1 pt    HISTORY OF PREADOLESCENT   SEXUAL ABUSE                                                             [  ]3 pts        [  ]0pts    PSYCHOLOGICAL DISEASE                     ADD, OCD, Bipolar, Schizophrenia        [  ]2 pts         [  ]2 pts                      Depression                                               [  ]1 pt           [  ]1 pt           SCORING TOTAL   (add numbers and type here)              (0)                                     A score of 3 or lower indicated LOW risk for future opioid abuse  A score of 4 to 7 indicated moderate risk for future opioid abuse  A score of 8 or higher indicates a high risk for opioid abuse

## 2019-03-12 PROBLEM — Z45.2 ENCOUNTER FOR ADJUSTMENT AND MANAGEMENT OF VASCULAR ACCESS DEVICE: Chronic | Status: INACTIVE | Noted: 2018-04-26 | Resolved: 2019-03-11

## 2019-03-12 PROBLEM — I48.91 UNSPECIFIED ATRIAL FIBRILLATION: Chronic | Status: INACTIVE | Noted: 2018-04-26 | Resolved: 2019-03-11

## 2019-03-12 PROBLEM — T14.90XA INJURY, UNSPECIFIED, INITIAL ENCOUNTER: Chronic | Status: INACTIVE | Noted: 2018-04-26 | Resolved: 2019-03-11

## 2019-03-18 ENCOUNTER — TRANSCRIPTION ENCOUNTER (OUTPATIENT)
Age: 64
End: 2019-03-18

## 2019-03-19 ENCOUNTER — RESULT REVIEW (OUTPATIENT)
Age: 64
End: 2019-03-19

## 2019-03-19 ENCOUNTER — OUTPATIENT (OUTPATIENT)
Dept: OUTPATIENT SERVICES | Facility: HOSPITAL | Age: 64
LOS: 1 days | End: 2019-03-19
Payer: COMMERCIAL

## 2019-03-19 DIAGNOSIS — Z98.890 OTHER SPECIFIED POSTPROCEDURAL STATES: Chronic | ICD-10-CM

## 2019-03-19 DIAGNOSIS — S72.92XA UNSPECIFIED FRACTURE OF LEFT FEMUR, INITIAL ENCOUNTER FOR CLOSED FRACTURE: Chronic | ICD-10-CM

## 2019-03-19 DIAGNOSIS — Z86.010 PERSONAL HISTORY OF COLONIC POLYPS: ICD-10-CM

## 2019-03-19 DIAGNOSIS — M79.605 PAIN IN LEFT LEG: Chronic | ICD-10-CM

## 2019-03-19 DIAGNOSIS — Z96.659 PRESENCE OF UNSPECIFIED ARTIFICIAL KNEE JOINT: Chronic | ICD-10-CM

## 2019-03-19 DIAGNOSIS — Z01.818 ENCOUNTER FOR OTHER PREPROCEDURAL EXAMINATION: ICD-10-CM

## 2019-03-19 PROCEDURE — 88305 TISSUE EXAM BY PATHOLOGIST: CPT | Mod: 26

## 2019-03-21 LAB — SURGICAL PATHOLOGY STUDY: SIGNIFICANT CHANGE UP

## 2019-04-05 PROCEDURE — 45380 COLONOSCOPY AND BIOPSY: CPT | Mod: PT,59

## 2019-04-05 PROCEDURE — 88305 TISSUE EXAM BY PATHOLOGIST: CPT

## 2019-04-05 PROCEDURE — 45385 COLONOSCOPY W/LESION REMOVAL: CPT | Mod: PT

## 2019-09-09 PROBLEM — E78.00 PURE HYPERCHOLESTEROLEMIA, UNSPECIFIED: Chronic | Status: ACTIVE | Noted: 2019-03-11

## 2019-09-09 PROBLEM — I48.0 PAROXYSMAL ATRIAL FIBRILLATION: Chronic | Status: ACTIVE | Noted: 2019-03-11

## 2019-11-06 NOTE — PROGRESS NOTE ADULT - PROBLEM SELECTOR PROBLEM 2
Status post right knee replacement
PICC (peripherally inserted central catheter) in place

## 2019-11-15 ENCOUNTER — APPOINTMENT (OUTPATIENT)
Dept: PULMONOLOGY | Facility: CLINIC | Age: 64
End: 2019-11-15
Payer: MEDICARE

## 2019-11-15 VITALS
SYSTOLIC BLOOD PRESSURE: 122 MMHG | DIASTOLIC BLOOD PRESSURE: 70 MMHG | BODY MASS INDEX: 34.32 KG/M2 | HEIGHT: 65 IN | HEART RATE: 86 BPM | WEIGHT: 206 LBS | OXYGEN SATURATION: 96 %

## 2019-11-15 DIAGNOSIS — Z82.3 FAMILY HISTORY OF STROKE: ICD-10-CM

## 2019-11-15 DIAGNOSIS — I48.91 UNSPECIFIED ATRIAL FIBRILLATION: ICD-10-CM

## 2019-11-15 DIAGNOSIS — Z87.438 PERSONAL HISTORY OF OTHER DISEASES OF MALE GENITAL ORGANS: ICD-10-CM

## 2019-11-15 DIAGNOSIS — Z86.39 PERSONAL HISTORY OF OTHER ENDOCRINE, NUTRITIONAL AND METABOLIC DISEASE: ICD-10-CM

## 2019-11-15 PROCEDURE — 99204 OFFICE O/P NEW MOD 45 MIN: CPT

## 2019-11-15 NOTE — CONSULT LETTER
[Dear  ___] : Dear  [unfilled], [Consult Letter:] : I had the pleasure of evaluating your patient, [unfilled]. [Please see my note below.] : Please see my note below. [Consult Closing:] : Thank you very much for allowing me to participate in the care of this patient.  If you have any questions, please do not hesitate to contact me. [Sincerely,] : Sincerely, [FreeTextEntry3] : Jaylon Galindo MD FCCP\par Pulmonary/Critical Care/Sleep Medicine\par Department of Internal Medicine\par \par [facility] [employer]\par

## 2019-11-15 NOTE — DISCUSSION/SUMMARY
[Obstructive Sleep Apnea] : obstructive sleep apnea [Alcohol Avoidance] : alcohol avoidance [Sleep Study] : sleep study [Sedative Avoidance] : sedative avoidance [Weight Loss Program] : weight loss program [Patient] : discussed with the patient [de-identified] : The pathophysiology of sleep was explained to the patient in detail. Inclusive of this was the reasoning behind and the expected response to positive airway pressure therapy. Compliance was outlined including further followup

## 2019-11-15 NOTE — HISTORY OF PRESENT ILLNESS
[Obstructive Sleep Apnea] : obstructive sleep apnea [ESS: ___] : ESS score [unfilled] [To Bed: ___] : ~he/she~ goes to bed at [unfilled] [Arises: ___] : arises at [unfilled] [Sleep Onset Latency: ___ minutes] : sleep onset latency of [unfilled] minutes reported [Nocturnal Awakenings: ___] : ~he/she~ typically has [unfilled] nocturnal awakenings [Snoring] : no snoring [Frequent Nocturnal Awakening] : no nocturnal awakening [Witnessed Apneas] : no witnessed sleep apnea [Daytime Somnolence] : no daytime somnolence [Unintentional Sleep while Active] : no unintentional sleep while active [Unintentional Sleep While Inactive] : no unintentional sleep while inactive [Awakes Unrefreshed] : does not awake unrefreshed [Awakes with Headache] : no headache upon awakening [Awakening With Dry Mouth] : no dry mouth upon awakening [Recent  Weight Gain] : no recent weight gain [Unusual Sleep Behavior] : no unusual sleep behavior [Hypersomnolence] : no hypersomnolence [Cataplexy] : no cataplexy [Sleep Paralysis] : no sleep paralysis [Hypnagogic Hallucinations] : no hallucinations when falling asleep [Hypnopompic Hallucinations] : no hallucinations when awakening

## 2019-11-15 NOTE — PHYSICAL EXAM
[General Appearance - Well Developed] : well developed [Normal Appearance] : normal appearance [General Appearance - Well Nourished] : well nourished [Well Groomed] : well groomed [No Deformities] : no deformities [Normal Conjunctiva] : the conjunctiva exhibited no abnormalities [General Appearance - In No Acute Distress] : no acute distress [Eyelids - No Xanthelasma] : the eyelids demonstrated no xanthelasmas [Normal Oropharynx] : normal oropharynx [IV] : IV [Neck Cervical Mass (___cm)] : no neck mass was observed [Neck Appearance] : the appearance of the neck was normal [Jugular Venous Distention Increased] : there was no jugular-venous distention [Thyroid Diffuse Enlargement] : the thyroid was not enlarged [Thyroid Nodule] : there were no palpable thyroid nodules [Neck Circumference: ___] : neck circumference is [unfilled] [Heart Rate And Rhythm] : heart rate and rhythm were normal [Heart Sounds] : normal S1 and S2 [Murmurs] : no murmurs present [Respiration, Rhythm And Depth] : normal respiratory rhythm and effort [Exaggerated Use Of Accessory Muscles For Inspiration] : no accessory muscle use [Auscultation Breath Sounds / Voice Sounds] : lungs were clear to auscultation bilaterally [Abdomen Tenderness] : non-tender [Abdomen Soft] : soft [Abdomen Mass (___ Cm)] : no abdominal mass palpated [Abnormal Walk] : normal gait [Gait - Sufficient For Exercise Testing] : the gait was sufficient for exercise testing [Nail Clubbing] : no clubbing of the fingernails [Cyanosis, Localized] : no localized cyanosis [Skin Color & Pigmentation] : normal skin color and pigmentation [Petechial Hemorrhages (___cm)] : no petechial hemorrhages [Skin Turgor] : normal skin turgor [Deep Tendon Reflexes (DTR)] : deep tendon reflexes were 2+ and symmetric [] : no rash [No Focal Deficits] : no focal deficits [Sensation] : the sensory exam was normal to light touch and pinprick [FreeTextEntry1] : obese

## 2019-12-04 ENCOUNTER — OUTPATIENT (OUTPATIENT)
Dept: OUTPATIENT SERVICES | Facility: HOSPITAL | Age: 64
LOS: 1 days | End: 2019-12-04
Payer: MEDICARE

## 2019-12-04 DIAGNOSIS — S72.92XA UNSPECIFIED FRACTURE OF LEFT FEMUR, INITIAL ENCOUNTER FOR CLOSED FRACTURE: Chronic | ICD-10-CM

## 2019-12-04 DIAGNOSIS — G47.33 OBSTRUCTIVE SLEEP APNEA (ADULT) (PEDIATRIC): ICD-10-CM

## 2019-12-04 DIAGNOSIS — M79.605 PAIN IN LEFT LEG: Chronic | ICD-10-CM

## 2019-12-04 DIAGNOSIS — Z98.890 OTHER SPECIFIED POSTPROCEDURAL STATES: Chronic | ICD-10-CM

## 2019-12-04 DIAGNOSIS — Z96.659 PRESENCE OF UNSPECIFIED ARTIFICIAL KNEE JOINT: Chronic | ICD-10-CM

## 2019-12-04 PROCEDURE — 95806 SLEEP STUDY UNATT&RESP EFFT: CPT | Mod: 26

## 2019-12-04 PROCEDURE — 95806 SLEEP STUDY UNATT&RESP EFFT: CPT

## 2019-12-04 PROCEDURE — G0399: CPT

## 2019-12-20 ENCOUNTER — APPOINTMENT (OUTPATIENT)
Dept: PULMONOLOGY | Facility: CLINIC | Age: 64
End: 2019-12-20
Payer: MEDICARE

## 2019-12-20 VITALS
OXYGEN SATURATION: 96 % | HEIGHT: 65 IN | WEIGHT: 212 LBS | HEART RATE: 74 BPM | DIASTOLIC BLOOD PRESSURE: 70 MMHG | SYSTOLIC BLOOD PRESSURE: 110 MMHG | BODY MASS INDEX: 35.32 KG/M2

## 2019-12-20 DIAGNOSIS — G47.33 OBSTRUCTIVE SLEEP APNEA (ADULT) (PEDIATRIC): ICD-10-CM

## 2019-12-20 PROCEDURE — 99213 OFFICE O/P EST LOW 20 MIN: CPT

## 2019-12-20 RX ORDER — METOPROLOL SUCCINATE 50 MG/1
50 TABLET, EXTENDED RELEASE ORAL
Refills: 0 | Status: ACTIVE | COMMUNITY

## 2019-12-20 RX ORDER — APIXABAN 5 MG/1
5 TABLET, FILM COATED ORAL
Refills: 0 | Status: ACTIVE | COMMUNITY

## 2019-12-20 NOTE — CONSULT LETTER
[Dear  ___] : Dear  [unfilled], [Consult Letter:] : I had the pleasure of evaluating your patient, [unfilled]. [Please see my note below.] : Please see my note below. [Consult Closing:] : Thank you very much for allowing me to participate in the care of this patient.  If you have any questions, please do not hesitate to contact me. [Sincerely,] : Sincerely, [FreeTextEntry3] : Jaylon Galindo MD FCCP\par Pulmonary/Critical Care/Sleep Medicine\par Department of Internal Medicine\par \par [facility] [employer]\par

## 2019-12-20 NOTE — PROCEDURE
[FreeTextEntry1] : Portable monitor sleep study performed on 12/4/19 demonstrated a respiratory event index of 28.1

## 2019-12-20 NOTE — DISCUSSION/SUMMARY
[FreeTextEntry1] : Patient was advised to initiate CPAP or an oral appliance, following CPAP titration. Due to  illness in his family as well as his unwillingness to initiate auto set CPAP, no further therapy is being instituted. Patient has been advised if he does change his mind within the next year, these results will still be applicable. [Obstructive Sleep Apnea] : obstructive sleep apnea [Moderate] : moderate in severity [CPAP Titration] : CPAP titration [Alcohol Avoidance] : alcohol avoidance [Sedative Avoidance] : sedative avoidance [Weight Loss Program] : weight loss program [Patient] : discussed with the patient [de-identified] : The pathophysiology of sleep was explained to the patient in detail. Inclusive of this was the reasoning behind and the expected response to positive airway pressure therapy. Compliance was outlined including further followup

## 2019-12-20 NOTE — PHYSICAL EXAM
[General Appearance - Well Developed] : well developed [Normal Appearance] : normal appearance [Well Groomed] : well groomed [General Appearance - Well Nourished] : well nourished [No Deformities] : no deformities [General Appearance - In No Acute Distress] : no acute distress [Normal Conjunctiva] : the conjunctiva exhibited no abnormalities [Eyelids - No Xanthelasma] : the eyelids demonstrated no xanthelasmas [Normal Oropharynx] : normal oropharynx [IV] : IV [Neck Appearance] : the appearance of the neck was normal [Neck Cervical Mass (___cm)] : no neck mass was observed [Jugular Venous Distention Increased] : there was no jugular-venous distention [Thyroid Diffuse Enlargement] : the thyroid was not enlarged [Thyroid Nodule] : there were no palpable thyroid nodules [Neck Circumference: ___] : neck circumference is [unfilled] [Heart Rate And Rhythm] : heart rate and rhythm were normal [Heart Sounds] : normal S1 and S2 [Murmurs] : no murmurs present [Respiration, Rhythm And Depth] : normal respiratory rhythm and effort [Exaggerated Use Of Accessory Muscles For Inspiration] : no accessory muscle use [Auscultation Breath Sounds / Voice Sounds] : lungs were clear to auscultation bilaterally [Abdomen Soft] : soft [Abdomen Tenderness] : non-tender [Abdomen Mass (___ Cm)] : no abdominal mass palpated [FreeTextEntry1] : obese [Abnormal Walk] : normal gait [Gait - Sufficient For Exercise Testing] : the gait was sufficient for exercise testing [Cyanosis, Localized] : no localized cyanosis [Nail Clubbing] : no clubbing of the fingernails [Petechial Hemorrhages (___cm)] : no petechial hemorrhages [Deep Tendon Reflexes (DTR)] : deep tendon reflexes were 2+ and symmetric [Sensation] : the sensory exam was normal to light touch and pinprick [No Focal Deficits] : no focal deficits [Skin Color & Pigmentation] : normal skin color and pigmentation [Skin Turgor] : normal skin turgor [] : no rash

## 2019-12-20 NOTE — HISTORY OF PRESENT ILLNESS
[Obstructive Sleep Apnea] : obstructive sleep apnea [Snoring] : no snoring [Witnessed Apneas] : no witnessed sleep apnea [Frequent Nocturnal Awakening] : no nocturnal awakening [Daytime Somnolence] : no daytime somnolence [ESS: ___] : ESS score [unfilled] [Unintentional Sleep while Active] : no unintentional sleep while active [Unintentional Sleep While Inactive] : no unintentional sleep while inactive [Awakes Unrefreshed] : does not awake unrefreshed [Awakes with Headache] : no headache upon awakening [Awakening With Dry Mouth] : no dry mouth upon awakening [Recent  Weight Gain] : no recent weight gain [Unusual Sleep Behavior] : no unusual sleep behavior [Hypersomnolence] : no hypersomnolence [Cataplexy] : no cataplexy [Sleep Paralysis] : no sleep paralysis [Hypnagogic Hallucinations] : no hallucinations when falling asleep [Hypnopompic Hallucinations] : no hallucinations when awakening [To Bed: ___] : ~he/she~ goes to bed at [unfilled] [Sleep Onset Latency: ___ minutes] : sleep onset latency of [unfilled] minutes reported [Arises: ___] : arises at [unfilled] [Nocturnal Awakenings: ___] : ~he/she~ typically has [unfilled] nocturnal awakenings [FreeTextEntry1] : Patient symptoms are unchanged. He underwent a home sleep study with the results reviewed today

## 2020-07-10 NOTE — OCCUPATIONAL THERAPY INITIAL EVALUATION ADULT - RANGE OF MOTION EXAMINATION, UPPER EXTREMITY
bilateral UE Active ROM was WFL  (within functional limits)/bilateral UE Passive ROM was WFL  (within functional limits)
LLE/weight-bearing as tolerated

## 2020-07-28 NOTE — PRE-OP CHECKLIST - SIDE RAILS UP
Use Enhanced Medication Counseling?: No Azithromycin Pregnancy And Lactation Text: This medication is considered safe during pregnancy and is also secreted in breast milk. Topical Sulfur Applications Counseling: Topical Sulfur Counseling: Patient counseled that this medication may cause skin irritation or allergic reactions.  In the event of skin irritation, the patient was advised to reduce the amount of the drug applied or use it less frequently.   The patient verbalized understanding of the proper use and possible adverse effects of topical sulfur application.  All of the patient's questions and concerns were addressed. Dapsone Counseling: I discussed with the patient the risks of dapsone including but not limited to hemolytic anemia, agranulocytosis, rashes, methemoglobinemia, kidney failure, peripheral neuropathy, headaches, GI upset, and liver toxicity.  Patients who start dapsone require monitoring including baseline LFTs and weekly CBCs for the first month, then every month thereafter.  The patient verbalized understanding of the proper use and possible adverse effects of dapsone.  All of the patient's questions and concerns were addressed. Birth Control Pills Counseling: Birth Control Pill Counseling: I discussed with the patient the potential side effects of OCPs including but not limited to increased risk of stroke, heart attack, thrombophlebitis, deep venous thrombosis, hepatic adenomas, breast changes, GI upset, headaches, and depression.  The patient verbalized understanding of the proper use and possible adverse effects of OCPs. All of the patient's questions and concerns were addressed. Dapsone Pregnancy And Lactation Text: This medication is Pregnancy Category C and is not considered safe during pregnancy or breast feeding. Topical Clindamycin Counseling: Patient counseled that this medication may cause skin irritation or allergic reactions.  In the event of skin irritation, the patient was advised to reduce the amount of the drug applied or use it less frequently.   The patient verbalized understanding of the proper use and possible adverse effects of clindamycin.  All of the patient's questions and concerns were addressed. Detail Level: Zone Topical Clindamycin Pregnancy And Lactation Text: This medication is Pregnancy Category B and is considered safe during pregnancy. It is unknown if it is excreted in breast milk. n/a Erythromycin Pregnancy And Lactation Text: This medication is Pregnancy Category B and is considered safe during pregnancy. It is also excreted in breast milk. Topical Sulfur Applications Pregnancy And Lactation Text: This medication is Pregnancy Category C and has an unknown safety profile during pregnancy. It is unknown if this topical medication is excreted in breast milk. Doxycycline Counseling:  Patient counseled regarding possible photosensitivity and increased risk for sunburn.  Patient instructed to avoid sunlight, if possible.  When exposed to sunlight, patients should wear protective clothing, sunglasses, and sunscreen.  The patient was instructed to call the office immediately if the following severe adverse effects occur:  hearing changes, easy bruising/bleeding, severe headache, or vision changes.  The patient verbalized understanding of the proper use and possible adverse effects of doxycycline.  All of the patient's questions and concerns were addressed. Tazorac Counseling:  Patient advised that medication is irritating and drying.  Patient may need to apply sparingly and wash off after an hour before eventually leaving it on overnight.  The patient verbalized understanding of the proper use and possible adverse effects of tazorac.  All of the patient's questions and concerns were addressed. Topical Retinoid counseling:  Patient advised to apply a pea-sized amount only at bedtime and wait 30 minutes after washing their face before applying.  If too drying, patient may add a non-comedogenic moisturizer. The patient verbalized understanding of the proper use and possible adverse effects of retinoids.  All of the patient's questions and concerns were addressed. Topical Retinoid Pregnancy And Lactation Text: This medication is Pregnancy Category C. It is unknown if this medication is excreted in breast milk. Erythromycin Counseling:  I discussed with the patient the risks of erythromycin including but not limited to GI upset, allergic reaction, drug rash, diarrhea, increase in liver enzymes, and yeast infections. Bactrim Pregnancy And Lactation Text: This medication is Pregnancy Category D and is known to cause fetal risk.  It is also excreted in breast milk. High Dose Vitamin A Pregnancy And Lactation Text: High dose vitamin A therapy is contraindicated during pregnancy and breast feeding. Bactrim Counseling:  I discussed with the patient the risks of sulfa antibiotics including but not limited to GI upset, allergic reaction, drug rash, diarrhea, dizziness, photosensitivity, and yeast infections.  Rarely, more serious reactions can occur including but not limited to aplastic anemia, agranulocytosis, methemoglobinemia, blood dyscrasias, liver or kidney failure, lung infiltrates or desquamative/blistering drug rashes. Tazorac Pregnancy And Lactation Text: This medication is not safe during pregnancy. It is unknown if this medication is excreted in breast milk. Birth Control Pills Pregnancy And Lactation Text: This medication should be avoided if pregnant and for the first 30 days post-partum. Isotretinoin Counseling: Patient should get monthly blood tests, not donate blood, not drive at night if vision affected, not share medication, and not undergo elective surgery for 6 months after tx completed. Side effects reviewed, pt to contact office should one occur. High Dose Vitamin A Counseling: Side effects reviewed, pt to contact office should one occur. Benzoyl Peroxide Pregnancy And Lactation Text: This medication is Pregnancy Category C. It is unknown if benzoyl peroxide is excreted in breast milk. Isotretinoin Pregnancy And Lactation Text: This medication is Pregnancy Category X and is considered extremely dangerous during pregnancy. It is unknown if it is excreted in breast milk. Benzoyl Peroxide Counseling: Patient counseled that medicine may cause skin irritation and bleach clothing.  In the event of skin irritation, the patient was advised to reduce the amount of the drug applied or use it less frequently.   The patient verbalized understanding of the proper use and possible adverse effects of benzoyl peroxide.  All of the patient's questions and concerns were addressed. Tetracycline Counseling: Patient counseled regarding possible photosensitivity and increased risk for sunburn.  Patient instructed to avoid sunlight, if possible.  When exposed to sunlight, patients should wear protective clothing, sunglasses, and sunscreen.  The patient was instructed to call the office immediately if the following severe adverse effects occur:  hearing changes, easy bruising/bleeding, severe headache, or vision changes.  The patient verbalized understanding of the proper use and possible adverse effects of tetracycline.  All of the patient's questions and concerns were addressed. Patient understands to avoid pregnancy while on therapy due to potential birth defects. Minocycline Pregnancy And Lactation Text: This medication is Pregnancy Category D and not consider safe during pregnancy. It is also excreted in breast milk. Sarecycline Counseling: Patient advised regarding possible photosensitivity and discoloration of the teeth, skin, lips, tongue and gums.  Patient instructed to avoid sunlight, if possible.  When exposed to sunlight, patients should wear protective clothing, sunglasses, and sunscreen.  The patient was instructed to call the office immediately if the following severe adverse effects occur:  hearing changes, easy bruising/bleeding, severe headache, or vision changes.  The patient verbalized understanding of the proper use and possible adverse effects of sarecycline.  All of the patient's questions and concerns were addressed. Spironolactone Pregnancy And Lactation Text: This medication can cause feminization of the male fetus and should be avoided during pregnancy. The active metabolite is also found in breast milk. Minocycline Counseling: Patient advised regarding possible photosensitivity and discoloration of the teeth, skin, lips, tongue and gums.  Patient instructed to avoid sunlight, if possible.  When exposed to sunlight, patients should wear protective clothing, sunglasses, and sunscreen.  The patient was instructed to call the office immediately if the following severe adverse effects occur:  hearing changes, easy bruising/bleeding, severe headache, or vision changes.  The patient verbalized understanding of the proper use and possible adverse effects of minocycline.  All of the patient's questions and concerns were addressed. Doxycycline Pregnancy And Lactation Text: This medication is Pregnancy Category D and not consider safe during pregnancy. It is also excreted in breast milk but is considered safe for shorter treatment courses. Azithromycin Counseling:  I discussed with the patient the risks of azithromycin including but not limited to GI upset, allergic reaction, drug rash, diarrhea, and yeast infections. Spironolactone Counseling: Patient advised regarding risks of diarrhea, abdominal pain, hyperkalemia, birth defects (for female patients), liver toxicity and renal toxicity. The patient may need blood work to monitor liver and kidney function and potassium levels while on therapy. The patient verbalized understanding of the proper use and possible adverse effects of spironolactone.  All of the patient's questions and concerns were addressed.

## 2021-08-12 NOTE — PATIENT PROFILE ADULT. - TEACHING/LEARNING EDUCATIONAL LEVEL
career/technical training
direct patient care (not related to procedure), additional history taking, interpretation of diagnostic studies, documentation, consultation with other physicians

## 2022-02-01 NOTE — OCCUPATIONAL THERAPY INITIAL EVALUATION ADULT - FINE MOTOR COORDINATION, LEFT HAND THUMB/FINGER OPPOSITION SKILLS, OT EVAL
Instructions: This plan will send the code FBSE to the PM system.  DO NOT or CHANGE the price. Price (Do Not Change): 0.00 Detail Level: Generalized normal performance

## 2022-02-17 ENCOUNTER — APPOINTMENT (OUTPATIENT)
Dept: CARDIOLOGY | Facility: CLINIC | Age: 67
End: 2022-02-17

## 2022-02-22 NOTE — PHYSICAL THERAPY INITIAL EVALUATION ADULT - PERSONAL SAFETY AND JUDGMENT, REHAB EVAL
intact Methotrexate Pregnancy And Lactation Text: This medication is Pregnancy Category X and is known to cause fetal harm. This medication is excreted in breast milk.

## 2022-05-25 NOTE — PATIENT PROFILE ADULT. - PSH
Yes Acute leg pain, left  s/p femur surgery - gisela  S/P inguinal hernia repair  Right  S/P knee replacement  bilateral

## 2023-06-20 NOTE — H&P PST ADULT - VASCULAR
LIPASE 20.0   BILITOT 0.9   ALKPHOS 88     PT/INR: No results for input(s): PROTIME, INR in the last 72 hours. APTT: No results for input(s): APTT in the last 72 hours. UA:  Recent Labs     06/18/23  1200   COLORU Yellow   PHUR 7.5   WBCUA 3-5   RBCUA 0-2   MUCUS 2+*   BACTERIA 1+*   CLARITYU SL CLOUDY*   SPECGRAV 1.010   LEUKOCYTESUR SMALL*   UROBILINOGEN 0.2   BILIRUBINUR Negative   BLOODU Negative   GLUCOSEU Negative   AMORPHOUS 2+     CULTURES    C Diff neg    GI pathogen:  No Shigella spp/EIEC DNA detected   No Shiga toxin-producing gene(s) detected   No Campylobacter spp. (jejuni and coli)DNA detected   No Salmonella spp. DNA detected   No Vibrio vulnificus/parahaemolyticus/cholerae DNA detected   No Plesiomonas shigelloides DNA detected   No Enterotoxigenic E. coli (ETEC) DNA detected   No Yersinia enterocolitica DNA detected   Normal Range:  None detected       CT ABDOMEN PELVIS W IV CONTRAST Additional Contrast? None   Final Result   1. Mild-to-moderate colitis noted involving the descending colon, with   increased fluid throughout colon. 2. Bibasilar mild atelectasis versus infiltrates. 3. Compression fractures in the lumbar spine, the L1 and L2 compression   fractures may be relatively recent. Underlying moderate lumbar degenerative   changes. 4. Mild hyperplasia left adrenal gland. Discharge Medications     Medication List        CHANGE how you take these medications      atenolol 50 MG tablet  Commonly known as: TENORMIN  Start taking 25 mg once a day after blood pressure causes systolic of 887. May increase up to 50 mg if blood pressure still over 140.   What changed:   how much to take  how to take this  when to take this  additional instructions            CONTINUE taking these medications      alendronate 70 MG tablet  Commonly known as: FOSAMAX     MULTIVITAMIN PO     pantoprazole 40 MG tablet  Commonly known as: PROTONIX            STOP taking these medications Equal and normal pulses (carotid, femoral, dorsalis pedis)

## 2023-06-29 NOTE — H&P PST ADULT - PSH
Acute leg pain, left  s/p femur surgery - gisela  S/P knee replacement  bilateral
Statement Selected

## 2023-08-08 NOTE — H&P PST ADULT - OPHTHALMOLOGIC
Just now (12:16 PM)     LC  Will review with Dr. Cohen         Note         Amanda Parikh, RN routed conversation to YANE Cohen Car Nurse Msg Pool 17 minutes ago (11:58 AM)     Amanda Parikh, RN 17 minutes ago (11:58 AM)     DZ  Patient is scheduled for port placement on 8/24/23 under MAC anesthesia. Patient is taking cilostazol (PLETAL) 50 MG twice daily and aspirin 325 daily. Do you these medications need to be held prior?          Note           
Message sent to Dr. Cohen  
Notified patient of below and verbalized understanding. Patient has no further questions and or concerns at this time.   
Per Dr. Noel collins for patient to HOLD Pletal and aspirin for 5 days prior to procedure  
negative

## 2024-02-27 NOTE — PHYSICAL THERAPY INITIAL EVALUATION ADULT - PERTINENT HX OF CURRENT PROBLEM, REHAB EVAL
Pt is a 63yo M who presents for pre-op assessment due to scheduled R TKA revision/removal of hardware on 5/3 [Dizziness] : dizziness [Unsteady Walking] : ataxia [Negative] : Heme/Lymph [Itching] : no itching [Mole Changes] : no mole changes [Nail Changes] : no nail changes [Skin Rash] : no skin rash [Hair Changes] : no hair changes [Headache] : no headache [Fainting] : no fainting [Confusion] : no confusion [Memory Loss] : no memory loss [de-identified] : right great toe and third toe

## 2024-04-08 NOTE — PROGRESS NOTE ADULT - SUBJECTIVE AND OBJECTIVE BOX
Patient is a 62y old  Male who presents with a chief complaint of Right TKA Septic Arthritis (04 May 2018 15:37)      OVERNIGHT EVENTS: none    REVIEW OF SYSTEMS: denies chest pain/SOB, diaphoresis, no F/C, cough, dizziness, headache, blurry vision, nausea, vomiting, abdominal pain. All others review of systems negative     MEDICATIONS  (STANDING):  ascorbic acid 500 milliGRAM(s) Oral two times a day  atorvastatin Oral Tab/Cap - Peds 10 milliGRAM(s) Oral daily  cefTRIAXone   IVPB 2 Gram(s) IV Intermittent every 24 hours  celecoxib 200 milliGRAM(s) Oral every 12 hours  DAPTOmycin IVPB 500 milliGRAM(s) IV Intermittent every 24 hours  docusate sodium 100 milliGRAM(s) Oral three times a day  folic acid 1 milliGRAM(s) Oral daily  metoprolol tartrate 25 milliGRAM(s) Oral two times a day  multivitamin 1 Tablet(s) Oral daily  pantoprazole    Tablet 40 milliGRAM(s) Oral daily  polyethylene glycol 3350 17 Gram(s) Oral daily  rivaroxaban 10 milliGRAM(s) Oral daily  sodium chloride 0.9%. 1000 milliLiter(s) (110 mL/Hr) IV Continuous <Continuous>  tamsulosin 0.4 milliGRAM(s) Oral daily    MEDICATIONS  (PRN):  acetaminophen   Tablet 650 milliGRAM(s) Oral every 6 hours PRN For Temp over 38.3 C (100.94 F)  aluminum hydroxide/magnesium hydroxide/simethicone Suspension 30 milliLiter(s) Oral four times a day PRN Indigestion  bisacodyl Suppository 10 milliGRAM(s) Rectal daily PRN If no bowel movement by postoperative day #2  HYDROmorphone  Injectable 1 milliGRAM(s) IV Push every 4 hours PRN breakthrough  magnesium hydroxide Suspension 30 milliLiter(s) Oral daily PRN Constipation  ondansetron   Disintegrating Tablet 4 milliGRAM(s) Oral every 6 hours PRN Nausea and/or Vomiting  oxyCODONE    IR 5 milliGRAM(s) Oral every 4 hours PRN Pain 1 - 5  oxyCODONE    IR 10 milliGRAM(s) Oral every 4 hours PRN Pain 6 - 10  senna 2 Tablet(s) Oral at bedtime PRN Constipation      Allergies    latex (Rash)  No Known Drug Allergies    Intolerances        T(F): 96.1 (05-07-18 @ 11:25), Max: 98 (05-07-18 @ 11:00)  HR: 75 (05-07-18 @ 11:25) (66 - 96)  BP: 126/76 (05-07-18 @ 11:25) (91/54 - 128/70)  RR: 18 (05-07-18 @ 11:25) (16 - 18)  SpO2: 100% (05-07-18 @ 11:25) (96% - 100%)  Wt(kg): --    PHYSICAL EXAM:  GENERAL: NAD, well-groomed, well-developed  HEAD:  Atraumatic, Normocephalic  EYES: EOMI, PERRLA, conjunctiva and sclera clear  ENMT: No tonsillar erythema, exudates, or enlargement; Moist mucous membranes, Good dentition, No lesions  NECK: Supple, No JVD, Normal thyroid  NERVOUS SYSTEM:  Alert & Oriented X3, Good concentration; Motor Strength 5/5 B/L upper and lower extremities; DTRs 2+ intact and symmetric  CHEST/LUNG: Clear to percussion bilaterally; No rales, rhonchi, wheezing, or rubs BL  HEART: Regular rate and rhythm; No murmurs, rubs, or gallops  ABDOMEN: Soft, Nontender, Nondistended; Bowel sounds present  EXTREMITIES:  RLE immobilizer with LISA drain   LYMPH: No lymphadenopathy noted  SKIN: No rashes or lesions    LABS:                        11.0   6.16  )-----------( 190      ( 06 May 2018 06:48 )             36.6     05-06    141  |  106  |  17  ----------------------------<  112<H>  3.7   |  26  |  0.99    Ca    9.6      06 May 2018 06:43          Cultures;   Culture Results:   No growth to date. (05.04.18 @ 01:13)      CAPILLARY BLOOD GLUCOSE        Lipid panel:           RADIOLOGY & ADDITIONAL TESTS:    Imaging Personally Reviewed:  [ x] YES    < from: Xray Knee 1 or 2 Views, Right (05.03.18 @ 14:35) >  Status post right total knee arthroplasty with right knee joint effusion.    < end of copied text >      Consultant(s) Notes Reviewed:  [x ] YES     Care Discussed with [x ] Consultants [X ] Patient [ ] Family  [x ]    [x ]  Other; RN [Follow-up Visit ___] : a follow-up visit  for [unfilled] [Spouse] : spouse

## 2024-08-27 ENCOUNTER — INPATIENT (INPATIENT)
Facility: HOSPITAL | Age: 69
LOS: 8 days | Discharge: NURSING, REHAB OR LONG-TERM CARE | DRG: 534 | End: 2024-09-05
Attending: ORTHOPAEDIC SURGERY | Admitting: ORTHOPAEDIC SURGERY
Payer: MEDICARE

## 2024-08-27 VITALS
RESPIRATION RATE: 20 BRPM | HEART RATE: 79 BPM | TEMPERATURE: 97 F | HEIGHT: 68 IN | SYSTOLIC BLOOD PRESSURE: 158 MMHG | DIASTOLIC BLOOD PRESSURE: 98 MMHG | OXYGEN SATURATION: 96 % | WEIGHT: 220.02 LBS

## 2024-08-27 DIAGNOSIS — Z96.659 PRESENCE OF UNSPECIFIED ARTIFICIAL KNEE JOINT: Chronic | ICD-10-CM

## 2024-08-27 DIAGNOSIS — Z98.890 OTHER SPECIFIED POSTPROCEDURAL STATES: Chronic | ICD-10-CM

## 2024-08-27 DIAGNOSIS — S72.92XA UNSPECIFIED FRACTURE OF LEFT FEMUR, INITIAL ENCOUNTER FOR CLOSED FRACTURE: Chronic | ICD-10-CM

## 2024-08-27 DIAGNOSIS — M79.605 PAIN IN LEFT LEG: Chronic | ICD-10-CM

## 2024-08-27 LAB
ALBUMIN SERPL ELPH-MCNC: 3.3 G/DL — SIGNIFICANT CHANGE UP (ref 3.3–5.2)
ALP SERPL-CCNC: 91 U/L — SIGNIFICANT CHANGE UP (ref 40–120)
ALT FLD-CCNC: 15 U/L — SIGNIFICANT CHANGE UP
ANION GAP SERPL CALC-SCNC: 14 MMOL/L — SIGNIFICANT CHANGE UP (ref 5–17)
APTT BLD: 32 SEC — SIGNIFICANT CHANGE UP (ref 24.5–35.6)
AST SERPL-CCNC: 27 U/L — SIGNIFICANT CHANGE UP
BASOPHILS # BLD AUTO: 0.05 K/UL — SIGNIFICANT CHANGE UP (ref 0–0.2)
BASOPHILS NFR BLD AUTO: 0.4 % — SIGNIFICANT CHANGE UP (ref 0–2)
BILIRUB SERPL-MCNC: 1.2 MG/DL — SIGNIFICANT CHANGE UP (ref 0.4–2)
BUN SERPL-MCNC: 18.1 MG/DL — SIGNIFICANT CHANGE UP (ref 8–20)
CALCIUM SERPL-MCNC: 9.3 MG/DL — SIGNIFICANT CHANGE UP (ref 8.4–10.5)
CHLORIDE SERPL-SCNC: 101 MMOL/L — SIGNIFICANT CHANGE UP (ref 96–108)
CO2 SERPL-SCNC: 23 MMOL/L — SIGNIFICANT CHANGE UP (ref 22–29)
CREAT SERPL-MCNC: 0.76 MG/DL — SIGNIFICANT CHANGE UP (ref 0.5–1.3)
EGFR: 97 ML/MIN/1.73M2 — SIGNIFICANT CHANGE UP
EOSINOPHIL # BLD AUTO: 0.06 K/UL — SIGNIFICANT CHANGE UP (ref 0–0.5)
EOSINOPHIL NFR BLD AUTO: 0.5 % — SIGNIFICANT CHANGE UP (ref 0–6)
GLUCOSE SERPL-MCNC: 123 MG/DL — HIGH (ref 70–99)
HCT VFR BLD CALC: 47.8 % — SIGNIFICANT CHANGE UP (ref 39–50)
HGB BLD-MCNC: 16.2 G/DL — SIGNIFICANT CHANGE UP (ref 13–17)
IMM GRANULOCYTES NFR BLD AUTO: 0.3 % — SIGNIFICANT CHANGE UP (ref 0–0.9)
INR BLD: 1.34 RATIO — HIGH (ref 0.85–1.18)
LYMPHOCYTES # BLD AUTO: 1 K/UL — SIGNIFICANT CHANGE UP (ref 1–3.3)
LYMPHOCYTES # BLD AUTO: 8.5 % — LOW (ref 13–44)
MCHC RBC-ENTMCNC: 31.5 PG — SIGNIFICANT CHANGE UP (ref 27–34)
MCHC RBC-ENTMCNC: 33.9 GM/DL — SIGNIFICANT CHANGE UP (ref 32–36)
MCV RBC AUTO: 92.8 FL — SIGNIFICANT CHANGE UP (ref 80–100)
MONOCYTES # BLD AUTO: 1.03 K/UL — HIGH (ref 0–0.9)
MONOCYTES NFR BLD AUTO: 8.8 % — SIGNIFICANT CHANGE UP (ref 2–14)
NEUTROPHILS # BLD AUTO: 9.55 K/UL — HIGH (ref 1.8–7.4)
NEUTROPHILS NFR BLD AUTO: 81.5 % — HIGH (ref 43–77)
PLATELET # BLD AUTO: 167 K/UL — SIGNIFICANT CHANGE UP (ref 150–400)
POTASSIUM SERPL-MCNC: 5 MMOL/L — SIGNIFICANT CHANGE UP (ref 3.5–5.3)
POTASSIUM SERPL-SCNC: 5 MMOL/L — SIGNIFICANT CHANGE UP (ref 3.5–5.3)
PROT SERPL-MCNC: 6.8 G/DL — SIGNIFICANT CHANGE UP (ref 6.6–8.7)
PROTHROM AB SERPL-ACNC: 14.7 SEC — HIGH (ref 9.5–13)
RBC # BLD: 5.15 M/UL — SIGNIFICANT CHANGE UP (ref 4.2–5.8)
RBC # FLD: 14 % — SIGNIFICANT CHANGE UP (ref 10.3–14.5)
SODIUM SERPL-SCNC: 138 MMOL/L — SIGNIFICANT CHANGE UP (ref 135–145)
WBC # BLD: 11.73 K/UL — HIGH (ref 3.8–10.5)
WBC # FLD AUTO: 11.73 K/UL — HIGH (ref 3.8–10.5)

## 2024-08-27 PROCEDURE — 73590 X-RAY EXAM OF LOWER LEG: CPT | Mod: 26,LT

## 2024-08-27 PROCEDURE — 93010 ELECTROCARDIOGRAM REPORT: CPT

## 2024-08-27 PROCEDURE — 71045 X-RAY EXAM CHEST 1 VIEW: CPT | Mod: 26

## 2024-08-27 PROCEDURE — 99285 EMERGENCY DEPT VISIT HI MDM: CPT | Mod: GC

## 2024-08-27 PROCEDURE — 73552 X-RAY EXAM OF FEMUR 2/>: CPT | Mod: 26,LT

## 2024-08-27 PROCEDURE — 73564 X-RAY EXAM KNEE 4 OR MORE: CPT | Mod: 26,LT

## 2024-08-27 RX ORDER — ACETAMINOPHEN 325 MG/1
650 TABLET ORAL ONCE
Refills: 0 | Status: COMPLETED | OUTPATIENT
Start: 2024-08-27 | End: 2024-08-27

## 2024-08-27 RX ORDER — CHLORHEXIDINE GLUCONATE 40 MG/ML
1 SOLUTION TOPICAL
Refills: 0 | Status: DISCONTINUED | OUTPATIENT
Start: 2024-08-27 | End: 2024-08-29

## 2024-08-27 RX ORDER — OXYCODONE AND ACETAMINOPHEN 7.5; 325 MG/1; MG/1
1 TABLET ORAL ONCE
Refills: 0 | Status: DISCONTINUED | OUTPATIENT
Start: 2024-08-27 | End: 2024-08-27

## 2024-08-27 RX ORDER — POVIDONE-IODINE 10 %
1 SOLUTION, NON-ORAL TOPICAL ONCE
Refills: 0 | Status: DISCONTINUED | OUTPATIENT
Start: 2024-08-27 | End: 2024-08-29

## 2024-08-27 RX ORDER — MUPIROCIN 2 %
1 OINTMENT (GRAM) TOPICAL
Refills: 0 | Status: DISCONTINUED | OUTPATIENT
Start: 2024-08-27 | End: 2024-08-29

## 2024-08-27 RX ADMIN — ACETAMINOPHEN 650 MILLIGRAM(S): 325 TABLET ORAL at 20:33

## 2024-08-27 NOTE — ED ADULT NURSE NOTE - OBJECTIVE STATEMENT
A&O x 4, presents to ed with trip and fall while playing a sport , pain to left knee, pain to knee, splinted by ems. h/o knee replacement. + able to wiggle toes. Breathing is spontaneous even and unlabored. Denies LOC, head trauma. Bed is in lowest position and side rails up. Safety precautions maintained.

## 2024-08-27 NOTE — ED ADULT NURSE NOTE - NSICDXPASTSURGICALHX_GEN_ALL_CORE_FT
PAST SURGICAL HISTORY:  Acute leg pain, left s/p femur surgery - gisela    Femur fracture, left repair    S/P inguinal hernia repair Right    S/P knee replacement bilateral

## 2024-08-27 NOTE — ED ADULT NURSE NOTE - NSFALLRISKINTERV_ED_ALL_ED

## 2024-08-27 NOTE — ED PROVIDER NOTE - PHYSICAL EXAMINATION
Gen: No fever, no change in activity level  ENT: No congestion, no rhinorrhea  Resp: No cough, no trouble breathing  Cardiovascular: No chest pain, no palpitation  Gastrointestinal: No nausea, no vomiting, no diarrhea  :  No change in urine output; no dysuria, no hematuria  MS: tenderness over left knee, decreased sensation over bilateral lower leg  Skin: No rashes  Neuro: No headache; no abnormal movements  Remainder negative, except as per the HPI

## 2024-08-27 NOTE — ED ADULT NURSE NOTE - NSICDXFAMILYHX_GEN_ALL_CORE_FT
FAMILY HISTORY:  Father  Still living? No  Family history of stroke (cerebrovascular), Age at diagnosis: 61-70

## 2024-08-27 NOTE — ED PROVIDER NOTE - CLINICAL SUMMARY MEDICAL DECISION MAKING FREE TEXT BOX
patient had a mechanical fall from standing no head trauma, has knee pain and tenderness, will order xray of left knee and lower leg, provide pain management.

## 2024-08-27 NOTE — CONSULT NOTE ADULT - SUBJECTIVE AND OBJECTIVE BOX
Pt Name: DIMAS HARDY    MRN: [id number]    Patient is a 69y Male presenting to the emergency department with a chief complaint of Left knee pain.  Patient was playing [employer] ball when he missteped he rolled over some bushes on to his left side. Following the fall pain felt immediate pain in left knee and unable to weight bear on LLE. Patient is s/p L TKA in 2014, R TKA in 2013 with subsequent revision 2/2 to infection of right knee in 2018 all with with Dr. Alonzo. Patient also has history of Left hip IMN. Of note patient presents with chronic wounds to Left shin for which he typically sees a wound care physician and vascular surgeon for monitoring, has associated neuropathy at baseline.       REVIEW OF SYSTEMS    General: Alert, responsive, in NAD    Skin/Breast: No rashes, no pruritis     Musculoskeletal: SEE HPI.    Neurological: No sensory or motor changes.       PAST MEDICAL & SURGICAL HISTORY:  HTN (hypertension)      BPH without urinary obstruction      Paroxysmal atrial fibrillation      Hypercholesteremia      S/P knee replacement  bilateral      Acute leg pain, left  s/p femur surgery - gisela      S/P inguinal hernia repair  Right      Femur fracture, left  repair          Allergies: adhesives (Unknown)  sulfa drugs (Unknown)  latex (Rash)      Medications: chlorhexidine 2% Cloths 1 Application(s) Topical <User Schedule>  mupirocin 2% Ointment 1 Application(s) Both Nostrils two times a day  povidone iodine 10% Nasal Swab 1 Application(s) Both Nostrils once      FAMILY HISTORY:  Family history of stroke (cerebrovascular) (Father)    : non-contributory    Social History:         Vital Signs Last 24 Hrs  T(C): 36.3 (27 Aug 2024 15:50), Max: 36.3 (27 Aug 2024 15:50)  T(F): 97.3 (27 Aug 2024 15:50), Max: 97.3 (27 Aug 2024 15:50)  HR: 79 (27 Aug 2024 15:50) (79 - 79)  BP: 158/98 (27 Aug 2024 15:50) (158/98 - 158/98)  BP(mean): --  RR: 20 (27 Aug 2024 15:50) (20 - 20)  SpO2: 96% (27 Aug 2024 15:50) (96% - 96%)    Parameters below as of 27 Aug 2024 15:50  Patient On (Oxygen Delivery Method): room air      Daily Height in cm: 172.72 (27 Aug 2024 15:50)    Daily       PHYSICAL EXAM:      Appearance: Alert, responsive, in no acute distress.    Neurological: Sensation is grossly intact to light touch. baseline neuropathy of b/l LE    Skin: surgical incision to Right knee well healed, surgical incision to left knee well healed, chronic wounds to LLE over anterior shin present,    Vascular: 2+ distal pulses. Cap refill < 2 sec. chronic venous changes present to LE,  +superficial ulcerations to left shin     Musculoskeletal:         Left Lower Extremity: Hip: NTTP, HF intact. unable to SLR 2/2 to fracture / pain,  Knee: +TTP to distal femur, thigh compartments soft and compressible, unable to access ROM and KF/KE, Ankle: NTTP, FROM. DF/PF/EHL/FHL intact. Compartments soft compressible. Sensation intact to light touch. +palpable DP pulse       Right Lower Extremity: Hip: NTTP, FROM. HF/HE intact. Knee: NTTP, FROM. KE/KF intact. Ankle: NTTP, FROM. DF/PF/EHL/FHL intact. Compartments soft compressible. Sensation intact to light touch.     Imaging Studies:  PRELIMINARY PA READ  Xray left femur reveals left hip IMN, partially visualized periprosthetic fracture of femoral component left knee   Xray left knee reveals periprosthetic fracture of femoral component left knee     A/P:  Pt is a  69y Male with  found to have periprosthetic fracture of Left TKA    PLAN:   * Pain control   * Bulky morales and knee immobilizer placed to LLE, keep on at all times  * CT LLE ordered  * Bedrest   * NPO for now   * Recommend Vascular consult for LLE wounds  * Plan to be finalized following CT and discussion with Ortho trauma attending vs joint attending   * Case, images, plan discussed with Dr. Clarke ortho attending

## 2024-08-27 NOTE — ED PROVIDER NOTE - ATTENDING CONTRIBUTION TO CARE
69y M w/ hx Afib on Eliquis, HTN, HLD; presents for knee injury. Pt says he tripped and twisted his left knee earlier today. Says he landed on his back side. Denies head strike or other injury. On exam pt in no acute distress. +Mild tenderness to left knee with no obvious deformity, no tenderness over ankle. +Chronic superficial wound to left shin without active drainage or surrounding erythema/warmth. 2+ DP pulse. Will check X-rays, consider CT scan.

## 2024-08-27 NOTE — ED ADULT NURSE NOTE - NSICDXPASTMEDICALHX_GEN_ALL_CORE_FT
PAST MEDICAL HISTORY:  BPH without urinary obstruction     HTN (hypertension)     Hypercholesteremia     Paroxysmal atrial fibrillation

## 2024-08-27 NOTE — ED PROVIDER NOTE - PROGRESS NOTE DETAILS
Lucas: Ortho was consulted for left periprosthetic distal femur fracture. Requesting vascular consult due to chronic wound to left shin -- advising no acute interventions. Signed out to Dr. Miles pending final ortho recs. received on s/o pending ortho recs. discussed with ortho plan to admit for OR tomorrow

## 2024-08-27 NOTE — CONSULT NOTE ADULT - SUBJECTIVE AND OBJECTIVE BOX
Patient is a 69y Male presenting to the emergency department with a chief complaint of Left knee pain.  Patient was playing [employer] ball when he missteped he rolled over some bushes on to his left side. Following the fall pain felt immediate pain in left knee and unable to weight bear on LLE. Patient is s/p L TKA in 2014, R TKA in 2013 with subsequent revision 2/2 to infection of right knee in 2018 all with with Dr. Alonzo. Patient also has history of Left hip IMN. Of note patient presents with chronic wounds to Left shin for which he typically sees a wound care physician and vascular surgeon for monitoring that his LLE wound waxes and wanes but is currently improving. Denies any pain, wound discharge or any other symptoms. Aside from venous insufficiency has no other vascular surgical issues.     Patient seen and examined at bedside. Pain improved since splinting by ortho, no complaints otherwise.    MEDICATIONS  (STANDING):  chlorhexidine 2% Cloths 1 Application(s) Topical <User Schedule>  mupirocin 2% Ointment 1 Application(s) Both Nostrils two times a day  povidone iodine 10% Nasal Swab 1 Application(s) Both Nostrils once    LOS:     VITALS:   T(C): 36.3 (08-27-24 @ 15:50), Max: 36.3 (08-27-24 @ 15:50)  HR: 79 (08-27-24 @ 15:50) (79 - 79)  BP: 158/98 (08-27-24 @ 15:50) (158/98 - 158/98)  RR: 20 (08-27-24 @ 15:50) (20 - 20)  SpO2: 96% (08-27-24 @ 15:50) (96% - 96%)    GENERAL: NAD, lying in bed comfortably  HEAD:  Atraumatic, Normocephalic  EYES: Cnjunctiva and sclera clear  ENT: Moist mucous membranes  NECK: Supple, No JVD  CHEST/LUNG: . Unlabored respirations  HEART: Regular rate and rhythm  ABDOMEN: , soft, nt and nd  EXTREMITIES: LLE splinted by ortho, palp DP, PT not palpable due to edema at the ankle. Sensation intact and foot wwp. One skin tear to anterior lateral left shin measuring 3x3 cm. Granulating venous stasis ulcer measuring 2x3x.5 cm to anterior shin without evidence of infection. b/l LE edema L>R.  NERVOUS SYSTEM:  A&Ox3, no focal deficits   SKIN: No rashes or lesions

## 2024-08-27 NOTE — ED PROVIDER NOTE - NS ED ROS FT
Gen: No fever, no change in activity level  ENT: No congestion, no rhinorrhea  Resp: No cough, no trouble breathing  Cardiovascular: No chest pain, no palpitation  Gastrointestinal: No nausea, no vomiting, no diarrhea  :  No change in urine output; no dysuria, no hematuria  MS: + left knee pain  Skin: No rashes  Neuro: No headache; no abnormal movements  Remainder negative, except as per the HPI

## 2024-08-27 NOTE — ED PROVIDER NOTE - OBJECTIVE STATEMENT
70 yo M PMHx Afib, HLD presents to the ED after a fall. Patient was playing [employer] ball when he missteped he rolled over some bushes on to his left side. No head trauma, no dizziness before the fall, no abnormal movements. No chest pain, no SOB, no Nausea, no vomitting. Also note patient has a wound on lower left leg not associated with fall. and has lower extremity neuropathy

## 2024-08-27 NOTE — CONSULT NOTE ADULT - ASSESSMENT
Assessment: Patient is a 70 y/o male presenting with left femur fx as well as chronic LLE venous insufficiency wounds for which vascular was consulted.    Plan:  -No acute vascular surgical intervention  -No contraindications to surgery from vascular perspective, no evidence of wound infection   -Can f/u with established vascular surgeon and wound care as outpatient   -Remainder of care per primary team    Discussed with attending surgeon Dr. Roman

## 2024-08-28 DIAGNOSIS — S72.402A UNSPECIFIED FRACTURE OF LOWER END OF LEFT FEMUR, INITIAL ENCOUNTER FOR CLOSED FRACTURE: ICD-10-CM

## 2024-08-28 DIAGNOSIS — M97.8XXA PERIPROSTHETIC FRACTURE AROUND OTHER INTERNAL PROSTHETIC JOINT, INITIAL ENCOUNTER: ICD-10-CM

## 2024-08-28 LAB
BLD GP AB SCN SERPL QL: SIGNIFICANT CHANGE UP
MRSA PCR RESULT.: SIGNIFICANT CHANGE UP
S AUREUS DNA NOSE QL NAA+PROBE: SIGNIFICANT CHANGE UP

## 2024-08-28 PROCEDURE — 99223 1ST HOSP IP/OBS HIGH 75: CPT | Mod: 57

## 2024-08-28 PROCEDURE — 73700 CT LOWER EXTREMITY W/O DYE: CPT | Mod: 26,LT,MC

## 2024-08-28 PROCEDURE — 99223 1ST HOSP IP/OBS HIGH 75: CPT

## 2024-08-28 RX ORDER — METOPROLOL TARTRATE 100 MG/1
25 TABLET ORAL ONCE
Refills: 0 | Status: DISCONTINUED | OUTPATIENT
Start: 2024-08-28 | End: 2024-08-29

## 2024-08-28 RX ORDER — APIXABAN 5 MG/1
1 TABLET, FILM COATED ORAL
Refills: 0 | DISCHARGE

## 2024-08-28 RX ORDER — METOPROLOL TARTRATE 100 MG/1
25 TABLET ORAL
Refills: 0 | Status: DISCONTINUED | OUTPATIENT
Start: 2024-08-28 | End: 2024-08-29

## 2024-08-28 RX ORDER — ASPIRIN 81 MG
1 TABLET, DELAYED RELEASE (ENTERIC COATED) ORAL
Refills: 0 | DISCHARGE

## 2024-08-28 RX ORDER — ENOXAPARIN SODIUM 100 MG/ML
40 INJECTION SUBCUTANEOUS ONCE
Refills: 0 | Status: COMPLETED | OUTPATIENT
Start: 2024-08-28 | End: 2024-08-28

## 2024-08-28 RX ORDER — CEFAZOLIN SODIUM 2 G/100ML
2000 INJECTION, SOLUTION INTRAVENOUS ONCE
Refills: 0 | Status: DISCONTINUED | OUTPATIENT
Start: 2024-08-29 | End: 2024-08-29

## 2024-08-28 RX ORDER — METOPROLOL TARTRATE 100 MG/1
50 TABLET ORAL
Refills: 0 | Status: DISCONTINUED | OUTPATIENT
Start: 2024-08-28 | End: 2024-08-28

## 2024-08-28 RX ORDER — TAMSULOSIN HYDROCHLORIDE 0.4 MG/1
0.4 CAPSULE ORAL AT BEDTIME
Refills: 0 | Status: DISCONTINUED | OUTPATIENT
Start: 2024-08-28 | End: 2024-08-29

## 2024-08-28 RX ORDER — SODIUM CHLORIDE 9 MG/ML
1000 INJECTION INTRAMUSCULAR; INTRAVENOUS; SUBCUTANEOUS
Refills: 0 | Status: DISCONTINUED | OUTPATIENT
Start: 2024-08-28 | End: 2024-08-29

## 2024-08-28 RX ORDER — METOPROLOL TARTRATE 100 MG/1
5 TABLET ORAL EVERY 6 HOURS
Refills: 0 | Status: DISCONTINUED | OUTPATIENT
Start: 2024-08-28 | End: 2024-08-29

## 2024-08-28 RX ORDER — METOPROLOL TARTRATE 100 MG/1
1 TABLET ORAL
Refills: 0 | DISCHARGE

## 2024-08-28 RX ADMIN — Medication 20 MILLIGRAM(S): at 21:40

## 2024-08-28 RX ADMIN — METOPROLOL TARTRATE 25 MILLIGRAM(S): 100 TABLET ORAL at 18:22

## 2024-08-28 RX ADMIN — Medication 4 MILLIGRAM(S): at 09:44

## 2024-08-28 RX ADMIN — Medication 4 MILLIGRAM(S): at 02:46

## 2024-08-28 RX ADMIN — ENOXAPARIN SODIUM 40 MILLIGRAM(S): 100 INJECTION SUBCUTANEOUS at 18:22

## 2024-08-28 RX ADMIN — TAMSULOSIN HYDROCHLORIDE 0.4 MILLIGRAM(S): 0.4 CAPSULE ORAL at 21:40

## 2024-08-28 RX ADMIN — METOPROLOL TARTRATE 5 MILLIGRAM(S): 100 TABLET ORAL at 18:53

## 2024-08-28 RX ADMIN — Medication 4 MILLIGRAM(S): at 21:58

## 2024-08-28 RX ADMIN — Medication 4 MILLIGRAM(S): at 22:58

## 2024-08-28 RX ADMIN — Medication 1 APPLICATION(S): at 21:41

## 2024-08-28 NOTE — H&P ADULT - NS ATTEND AMEND GEN_ALL_CORE FT
Orthopaedic Trauma Surgeon Addendum:    I have reviewed the physician assistant note and agree with the history, exam, and plan of care, except as noted.    Orthopedic Surgery is ready to proceed with surgery pending medical optimization and adequate Operating Room availability. Risks of surgical delay discussed with other providers and staff. Please call with any questions or concerns.     Amari Campbell MD  Orthopaedic Trauma Surgeon  [facility]

## 2024-08-28 NOTE — CONSULT NOTE ADULT - ASSESSMENT
Assessment  s/p mechanical fall no LOC  Periprosthetic femur fracture awaiting repair  Chronic AF with CVR on beta blocker/NOAC  Coronary artery calcification with normal perfusion on PET  Aortic sclerosis mild MR normal EF      Rec  Resume outpt lopressor 25 BID as AF rate poorly controlled at present  Monitor on tele pre and postop  Cont low dose asa/statin  Hold NOAC preop, resume postop when cleared by ortho  Cardiac status stable for planned femur fracture repair in am  Will follow

## 2024-08-28 NOTE — H&P ADULT - NSHPPHYSICALEXAM_GEN_ALL_CORE
Appearance: Alert, responsive, in no acute distress.    Neurological: Sensation is grossly intact to light touch. baseline neuropathy of b/l LE    Skin: surgical incision to Right knee well healed, surgical incision to left knee well healed, chronic wounds to LLE over anterior shin present,    Vascular: 2+ distal pulses. Cap refill < 2 sec. chronic venous changes present to LE,  +superficial ulcerations to left shin     Musculoskeletal:         Left Lower Extremity: Hip: NTTP, HF intact. unable to SLR 2/2 to fracture / pain,  Knee: +TTP to distal femur, thigh compartments soft and compressible, unable to access ROM and KF/KE, Ankle: NTTP, FROM. DF/PF/EHL/FHL intact. Compartments soft compressible. Sensation intact to light touch. +palpable DP pulse

## 2024-08-28 NOTE — PATIENT PROFILE ADULT - NSFALLSECTIONLABEL_GEN_A_CORE
Social History Narrative    No narrative on file       No Known Allergies  Current Outpatient Prescriptions on File Prior to Visit   Medication Sig Dispense Refill    baclofen (LIORESAL) 10 MG tablet TAKE 1 TABLET BY MOUTH TWICE DAILY AS NEEDED FOR MUSCLE SPASM. STOP TIZANIDINE 60 tablet 0    diclofenac (VOLTAREN) 50 MG EC tablet Take 1 tablet by mouth 2 times daily With food 60 tablet 1    Blood Pressure KIT Use to check blood pressure once a day and as needed for symptoms of high and low blood pressure. 1 kit 0    allopurinol (ZYLOPRIM) 100 MG tablet Take 1 tablet by mouth daily for kidney stone prevention 90 tablet 0    amitriptyline (ELAVIL) 25 MG tablet Take 2 tablets by mouth nightly for chronic pain, insomnia, mood 90 tablet 0    hydrochlorothiazide (HYDRODIURIL) 25 MG tablet Take 1 tablet by mouth daily for hypertension 90 tablet 1    fluticasone (FLONASE) 50 MCG/ACT nasal spray 1 spray by Nasal route daily for allergic rhinitis 1 Bottle 1    melatonin 5 MG TABS tablet Take 1 tablet by mouth nightly as needed (for insomnia) 90 tablet 0     No current facility-administered medications on file prior to visit. Review of Systems  A 14 point review of systems was completed by the patient on 8/1/2018 and is available in the media section of the scanned medical record and was reviewed on 8/1/2018. The review is negative with the exception of those things mentioned in the HPI and Past Medical History    Vital Signs:  Vitals:    08/01/18 1157   BP: 133/89   Pulse: 70       General Exam:   Constitutional: Patient is adequately groomed with no evidence of malnutrition  DTRs: Deep tendon reflexes are intact  Mental Status: The patient is oriented to time, place and person. The patient's mood and affect are appropriate. Lymphatic: The lymphatic examination bilaterally reveals all areas to be without enlargement or induration. Vascular: Examination reveals no swelling or calf tenderness. Peripheral pulses are palpable and 2+. Neurological: The patient has good coordination. There is no weakness or sensory deficit.      right Shoulder Examination:    Inspection:  No gross deformities, no signs of infection    Palpation:  He has no crepitus, He has pain over the proximal grove, He has tenderness over the rotator cuff    Active Range of Motion: 150/150/70 and T12 with pain at end range motions only. Passive Range of Motion:  160/160    Strength:  -5/5 resisted external rotation, 4+/5 Supraspinatus    Special Tests:  Positive speeds and Hawkains. Neurovascular: sensation to light touch intact,palpable radial pulses 2+      Comparison left Shoulder Examination:   Examination of the contralateral extremity does not show any tenderness, deformity or injury. Range of motion is unremarkable. There is no gross instability. There are no rashes, ulcerations or lesions. Strength and tone are normal.        Self assessment questionnaires were completed today. Radiology:     Plain radiographs of the right shoulder, comprising 3 views: AP in and out and axillary lateral were  obtained and reviewed in the office: no bony abnormalities seen, no fractures, dislocation seen. 2           MRI right shoulder 1/24/18    1.  Mild supraspinatus and infraspinatus tendinosis.  No rotator cuff tear. 2.  Prior biceps tenodesis. 3.  Postsurgical change anterior inferior labrum.  No recurrent labral tear visualized. Result Narrative   MRI RIGHT SHOULDER:    CLINICAL INDICATION: Right shoulder pain.  Possible rotator cuff tear. TECHNIQUE: Multiplanar, multi-sequential MRI of the shoulder was performed without contrast.  COMPARISON: Right shoulder MRI 11/02/2016 and right shoulder series 07/26/2017    FINDINGS:    TENDONS: Mild supraspinatus tendinosis.  No tear.  Mild infraspinatus tendinosis.  No tear. The teres minor tendon is intact.  The subscapularis tendon is intact.  There has been a biceps tenodesis since .

## 2024-08-28 NOTE — PATIENT PROFILE ADULT - DO YOU NEED ADDITIONAL SERVICES TO MANAGE ANY OF THESE MEDICAL CONDITIONS AT HOME?
-patient with diastolic CHF, MV replacement and TV replacement  -pulmonary vascular congestion noted on chest xray  -diuresis -patient with diastolic CHF, MV replacement and TV replacement  -pulmonary vascular congestion noted on chest xray  -diuresis  - -patient with diastolic CHF, MV replacement and TV replacement  -pulmonary vascular congestion noted on chest xray  -diuresis -patient with diastolic CHF, MV replacement and TV replacement  -pulmonary vascular congestion noted on chest xray  -diuresis -patient with diastolic CHF, MV replacement and TV replacement  -pulmonary vascular congestion noted on chest xray  -diuresis  - -patient with diastolic CHF, MV replacement and TV replacement  -pulmonary vascular congestion noted on chest xray  -diuresis  - ct chest as per ID / pulm -patient with diastolic CHF, MV replacement and TV replacement  -pulmonary vascular congestion noted on chest xray  -diuresis -patient with diastolic CHF, MV replacement and TV replacement  -pulmonary vascular congestion noted on chest xray  -diuresis  - ct chest as per ID / pulm -patient with diastolic CHF, MV replacement and TV replacement  -pulmonary vascular congestion noted on chest xray  -diuresis  - -patient with diastolic CHF, MV replacement and TV replacement  -pulmonary vascular congestion noted on chest xray  -diuresis -patient with diastolic CHF, MV replacement and TV replacement  -pulmonary vascular congestion noted on chest xray  -diuresis -patient with diastolic CHF, MV replacement and TV replacement  -pulmonary vascular congestion noted on chest xray  -diuresis  - -patient with diastolic CHF, MV replacement and TV replacement  -pulmonary vascular congestion noted on chest xray  -diuresis  - -patient with diastolic CHF, MV replacement and TV replacement  -pulmonary vascular congestion noted on chest xray  -diuresis -patient with diastolic CHF, MV replacement and TV replacement  -pulmonary vascular congestion noted on chest xray  -diuresis -patient with diastolic CHF, MV replacement and TV replacement  -pulmonary vascular congestion noted on chest xray  -diuresis -patient with diastolic CHF, MV replacement and TV replacement  -pulmonary vascular congestion noted on chest xray  -diuresis -patient with diastolic CHF, MV replacement and TV replacement  -pulmonary vascular congestion noted on chest xray  -diuresis -patient with diastolic CHF, MV replacement and TV replacement  -pulmonary vascular congestion noted on chest xray  -diuresis -patient with diastolic CHF, MV replacement and TV replacement  -pulmonary vascular congestion noted on chest xray  -diuresis -patient with diastolic CHF, MV replacement and TV replacement  -pulmonary vascular congestion noted on chest xray  -diuresis  - no

## 2024-08-28 NOTE — PATIENT PROFILE ADULT - FUNCTIONAL ASSESSMENT - DAILY ACTIVITY 1.
Pt not seen--cross cover page    Notified of AM BP of 88/56.  Lower than his baseline, though baseline appears about 110/60.  Will give coreg to avoid tachycardia in setting of afib.  Plan to hold AM lisinopril.  Rechecked BP.    Recheck 10:06 AM was 102/66.  Give usual coreg, hold lisinopril today, and give 1/2 usual dose torsemide (50mg)      Maryellen Gottlieb MD  3/26/2017  9:29 AM     4 = No assist / stand by assistance

## 2024-08-28 NOTE — CONSULT NOTE ADULT - SUBJECTIVE AND OBJECTIVE BOX
70 y/o Male with Hx of HTN, HLD, BPH, Prosomal Afib, he came in here in here for Left knee pain. Patient was playing ball when he missteped he rolled over some bushes on to his left side. Following the fall pain felt immediate pain in left knee and unable to weight bear on LLE, he is s/p L TKA in 2014, R TKA in 2013 with subsequent revision 2/2 to infection of right knee in 2018 all with with Dr. Alonzo. 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  Patient will be admitted for ORIF of femur tomorrow, medicine consulted for medical optimization.       Allergies:  	latex: Latex, Rash  	sulfa drugs: Drug Category, Unknown  	adhesives: Miscellaneous, Unknown    Home Medications:   * Incomplete Medication History as of 11-Mar-2019 11:58 documented in Structured Notes  · 	aspirin 325 mg oral delayed release tablet: 1 tab(s) orally once a day  · 	ascorbic acid 500 mg oral tablet: 1 tab(s) orally once a day  · 	Multiple Vitamins oral tablet: 1 tab(s) orally once a day  · 	Metoprolol Tartrate 50 mg oral tablet: 1 tab(s) orally 2 times a day  · 	atorvastatin 10 mg oral tablet: 1 tab(s) orally once a day  · 	tamsulosin 0.4 mg oral capsule: 1 cap(s) orally once a day  · 	Garlic oral tablet: 1 tab(s) orally once a day      PAST MEDICAL HISTORY:  BPH without urinary obstruction     HTN (hypertension)     Hypercholesteremia     Paroxysmal atrial fibrillation.     PAST SURGICAL HISTORY:  Acute leg pain, left s/p femur surgery - gisela    Femur fracture, left repair    S/P inguinal hernia repair Right    S/P knee replacement bilateral.     FAMILY HISTORY:  Father  Still living? No  Family history of stroke (cerebrovascular), Age at diagnosis: 61-70.    Social History:  Not a smoker, drinker using any drugs         Vital Signs Last 24 Hrs  T(C): 36.7 (28 Aug 2024 07:29), Max: 36.9 (27 Aug 2024 23:49)  T(F): 98.1 (28 Aug 2024 07:29), Max: 98.5 (27 Aug 2024 23:49)  HR: 82 (28 Aug 2024 07:29) (79 - 97)  BP: 121/82 (28 Aug 2024 07:29) (104/73 - 158/98)  BP(mean): 95 (27 Aug 2024 23:49) (95 - 95)  RR: 18 (28 Aug 2024 07:29) (16 - 20)  SpO2: 95% (28 Aug 2024 07:29) (95% - 97%)    Parameters below as of 28 Aug 2024 07:29  Patient On (Oxygen Delivery Method): room air        PHYSICAL EXAM:    GENERAL: Elderly male looking comfortable   HEENT: PERRL, +EOMI  NECK: soft, Supple, No JVD  CHEST/LUNG: Clear to auscultate bilaterally; No wheezing  HEART: S1S2+, Regular rate and rhythm; No murmurs  ABDOMEN: Soft, Nontender, Nondistended; Bowel sounds present  EXTREMITIES:  1+ Peripheral Pulses, No edema, limited left leg movement due to pain   SKIN: chronic left leg wound with dressings on   NEURO: AAOX3  PSYCH: normal mood      LABS:                        16.2   11.73 )-----------( 167      ( 27 Aug 2024 22:30 )             47.8     08-27    138  |  101  |  18.1  ----------------------------<  123<H>  5.0   |  23.0  |  0.76    Ca    9.3      27 Aug 2024 22:30    TPro  6.8  /  Alb  3.3  /  TBili  1.2  /  DBili  x   /  AST  27  /  ALT  15  /  AlkPhos  91  08-27    PT/INR - ( 27 Aug 2024 22:30 )   PT: 14.7 sec;   INR: 1.34 ratio         PTT - ( 27 Aug 2024 22:30 )  PTT:32.0 sec    I&O's Summary      MEDICATIONS  (STANDING):  atorvastatin 20 milliGRAM(s) Oral at bedtime  chlorhexidine 2% Cloths 1 Application(s) Topical <User Schedule>  enoxaparin Injectable 40 milliGRAM(s) SubCutaneous once  metoprolol tartrate 50 milliGRAM(s) Oral two times a day  mupirocin 2% Ointment 1 Application(s) Both Nostrils two times a day  povidone iodine 10% Nasal Swab 1 Application(s) Both Nostrils once  sodium chloride 0.9%. 1000 milliLiter(s) (75 mL/Hr) IV Continuous <Continuous>  tamsulosin 0.4 milliGRAM(s) Oral at bedtime    MEDICATIONS  (PRN):  morphine  - Injectable 4 milliGRAM(s) IV Push every 3 hours PRN Moderate Pain (4 - 6)           68 y/o Male with Hx of HTN, HLD, BPH, Prosomal Afib, he came in here in here for Left knee pain. Patient was playing ball when he missteped he rolled over some bushes on to his left side. Following the fall pain felt immediate pain in left knee and unable to weight bear on LLE, he is s/p L TKA in 2014, R TKA in 2013 with subsequent revision 2/2 to infection of right knee in 2018 all with with Dr. Alonzo. 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  Patient will be admitted for ORIF of femur tomorrow, medicine consulted for medical optimization.       Allergies:  	latex: Latex, Rash  	sulfa drugs: Drug Category, Unknown  	adhesives: Miscellaneous, Unknown    Home Medications:   * Incomplete Medication History as of 11-Mar-2019 11:58 documented in Structured Notes  · 	aspirin 325 mg oral delayed release tablet: 1 tab(s) orally once a day  · 	ascorbic acid 500 mg oral tablet: 1 tab(s) orally once a day  · 	Multiple Vitamins oral tablet: 1 tab(s) orally once a day  · 	Metoprolol Tartrate 50 mg oral tablet: 1 tab(s) orally 2 times a day  · 	atorvastatin 10 mg oral tablet: 1 tab(s) orally once a day  · 	tamsulosin 0.4 mg oral capsule: 1 cap(s) orally once a day  · 	Garlic oral tablet: 1 tab(s) orally once a day      PAST MEDICAL HISTORY:  BPH without urinary obstruction     HTN (hypertension)     Hypercholesteremia     Paroxysmal atrial fibrillation.     PAST SURGICAL HISTORY:  Acute leg pain, left s/p femur surgery - gisela    Femur fracture, left repair    S/P inguinal hernia repair Right    S/P knee replacement bilateral.     FAMILY HISTORY:  Father  Still living? No  Family history of stroke (cerebrovascular), Age at diagnosis: 61-70.    Social History:  Not a smoker, drinker using any drugs         Vital Signs Last 24 Hrs  T(C): 36.7 (28 Aug 2024 07:29), Max: 36.9 (27 Aug 2024 23:49)  T(F): 98.1 (28 Aug 2024 07:29), Max: 98.5 (27 Aug 2024 23:49)  HR: 82 (28 Aug 2024 07:29) (79 - 97)  BP: 121/82 (28 Aug 2024 07:29) (104/73 - 158/98)  BP(mean): 95 (27 Aug 2024 23:49) (95 - 95)  RR: 18 (28 Aug 2024 07:29) (16 - 20)  SpO2: 95% (28 Aug 2024 07:29) (95% - 97%)    Parameters below as of 28 Aug 2024 07:29  Patient On (Oxygen Delivery Method): room air        PHYSICAL EXAM:    GENERAL: Elderly male looking comfortable   HEENT: PERRL, +EOMI  NECK: soft, Supple, No JVD  CHEST/LUNG: Clear to auscultate bilaterally; No wheezing  HEART: S1S2+, Regular rate and rhythm; No murmurs  ABDOMEN: Soft, Nontender, Nondistended; Bowel sounds present  EXTREMITIES:  1+ Peripheral Pulses, No edema, limited left leg movement due to pain, valgus deformities of his feet   SKIN: chronic left leg wound with dressings on   NEURO: AAOX3  PSYCH: normal mood      LABS:                        16.2   11.73 )-----------( 167      ( 27 Aug 2024 22:30 )             47.8     08-27    138  |  101  |  18.1  ----------------------------<  123<H>  5.0   |  23.0  |  0.76    Ca    9.3      27 Aug 2024 22:30    TPro  6.8  /  Alb  3.3  /  TBili  1.2  /  DBili  x   /  AST  27  /  ALT  15  /  AlkPhos  91  08-27    PT/INR - ( 27 Aug 2024 22:30 )   PT: 14.7 sec;   INR: 1.34 ratio         PTT - ( 27 Aug 2024 22:30 )  PTT:32.0 sec    I&O's Summary      MEDICATIONS  (STANDING):  atorvastatin 20 milliGRAM(s) Oral at bedtime  chlorhexidine 2% Cloths 1 Application(s) Topical <User Schedule>  enoxaparin Injectable 40 milliGRAM(s) SubCutaneous once  metoprolol tartrate 50 milliGRAM(s) Oral two times a day  mupirocin 2% Ointment 1 Application(s) Both Nostrils two times a day  povidone iodine 10% Nasal Swab 1 Application(s) Both Nostrils once  sodium chloride 0.9%. 1000 milliLiter(s) (75 mL/Hr) IV Continuous <Continuous>  tamsulosin 0.4 milliGRAM(s) Oral at bedtime    MEDICATIONS  (PRN):  morphine  - Injectable 4 milliGRAM(s) IV Push every 3 hours PRN Moderate Pain (4 - 6)

## 2024-08-28 NOTE — PHARMACOTHERAPY INTERVENTION NOTE - COMMENTS
Referenced outpatient medical fill records and spoke to patient at bedside to obtain medication list.

## 2024-08-28 NOTE — H&P ADULT - HISTORY OF PRESENT ILLNESS
Pt Name: DIMAS HARDY    MRN: [id number]      Patient is a 69y Male presenting to the emergency department with a chief complaint of Left knee pain.  Patient was playing ball when he missteped he rolled over some bushes on to his left side. Following the fall pain felt immediate pain in left knee and unable to weight bear on LLE. Patient is s/p L TKA in 2014, R TKA in 2013 with subsequent revision 2/2 to infection of right knee in 2018 all with with Dr. Alonzo. Patient also has history of Left hip IMN. Of note patient presents with chronic wounds to Left shin for which he typically sees a wound care physician and vascular surgeon for monitoring, has associated neuropathy at baseline.  Patient will be admitted for ORIF of femur tomorrow   .

## 2024-08-28 NOTE — PATIENT PROFILE ADULT - FALL HARM RISK - HARM RISK INTERVENTIONS

## 2024-08-28 NOTE — H&P ADULT - NSHPREVIEWOFSYSTEMS_GEN_ALL_CORE
General: Alert, responsive, in NAD    Skin/Breast: chronic changes to lower left leg  	  Ophthalmologic: No visual changes. No redness.   	  ENMT:	No discharge. No swelling.    Respiratory and Thorax: No difficulty breathing. No cough.  	   Cardiovascular:	No chest pain. No palpitations.    Gastrointestinal:	 No abdominal pain. No diarrhea.     Genitourinary: No dysuria. No bleeding.    Musculoskeletal: SEE HPI.    Neurological: No sensory or motor changes.     Psychiatric: No anxiety or depression.    Hematology/Lymphatics: No swelling.    Endocrine: No Hx of diabetes.    ROS is otherwise negative.

## 2024-08-28 NOTE — CONSULT NOTE ADULT - ASSESSMENT
68 y/o Male with Hx of HTN, HLD, BPH, Prosomal Afib, he came in here in here for Left knee pain. Patient was playing ball when he missteped he rolled over some bushes on to his left side. Following the fall pain felt immediate pain in left knee and unable to weight bear on LLE, he is s/p L TKA in 2014, R TKA in 2013 with subsequent revision 2/2 to infection of right knee in 2018 all with with Dr. Alonzo. 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  Patient will be admitted for ORIF of femur tomorrow, medicine consulted for medical optimization.    Plan:     Left knee pain due to Periprosthetic fracture:      - VS stable  - abx per ortho   - c/w anti hypertensive to be restarted post op day 02 except if blood pressure goes >150 systolic   - IVF  once NPO  - opiate induced constipation regimen   - encouraging incentive spirometry   -c/w local wound care per ortho   -DVT prophylaxis and Pain meds as per Ortho team   -PT/OT and weight bearing per ortho     Paroxysmal Afib: will continue with  Metoprolol Tartrate 50 mg 2 times a day with holding parameters   On aspirin 325 mg once a day, patient's [id number] Score is 2, not on anticoagulation      Hx of HLD: will continue with atorvastatin 10 mg once a day    BPH: Will continue with tamsulosin 0.4 mg once a day, will monitor for retention     Chronic left leg wound: seen by vascular, will continue with wound care.     Leucocytosis: Likely reactive form fracture, no focus of infection, no fever, no chills.  70 y/o Male with Hx of HTN, HLD, BPH, Prosomal Afib, he came in here in here for Left knee pain. Patient was playing ball when he missteped he rolled over some bushes on to his left side. Following the fall pain felt immediate pain in left knee and unable to weight bear on LLE, he is s/p L TKA in 2014, R TKA in 2013 with subsequent revision 2/2 to infection of right knee in 2018 all with with Dr. Alonzo. 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  Patient will be admitted for ORIF of femur tomorrow, medicine consulted for medical optimization.    Plan:     Left knee pain due to Periprosthetic fracture:      - VS stable  - abx per ortho   - c/w anti hypertensive to be restarted post op day 02 except if blood pressure goes >150 systolic   - IVF  once NPO  - opiate induced constipation regimen   - encouraging incentive spirometry   -c/w local wound care per ortho   -DVT prophylaxis and Pain meds as per Ortho team   -PT/OT and weight bearing per ortho     Paroxysmal Afib: will continue with  Metoprolol Tartrate 50 mg 2 times a day with holding parameters   On aspirin 325 mg once a day, patient's [id number] Score is 2, not on anticoagulation    Hx of HLD: will continue with atorvastatin 10 mg once a day    BPH: Will continue with tamsulosin 0.4 mg once a day, will monitor for retention     Chronic left leg wound: seen by vascular, will continue with wound care.     Leucocytosis: Likely reactive form fracture, no focus of infection, no fever, no chills.     Patient denies Hx of exertional dysnea or chest pain,  no personal or family hx of easy bleeding, Patient's METS Score is 3 to 4  and RCRI is 1, patient labs, EKG reviewed, as per him his recent TTE was good, patient is at intermediate risk for perioperative cardiovascular complication and is optimized from the medicine point of view for planned procedure, cardiology has been consulted for further optimization as he follows with [facility] cardiology.  70 y/o Male with Hx of HTN, HLD, BPH, Prosomal Afib, he came in here in here for Left knee pain. Patient was playing ball when he missteped he rolled over some bushes on to his left side. Following the fall pain felt immediate pain in left knee and unable to weight bear on LLE, he is s/p L TKA in 2014, R TKA in 2013 with subsequent revision 2/2 to infection of right knee in 2018 all with with Dr. Alonzo. 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  Patient will be admitted for ORIF of femur tomorrow, medicine consulted for medical optimization.    Plan:     Left knee pain due to Periprosthetic fracture:      - VS stable  - abx per ortho   - c/w anti hypertensive to be restarted post op day 02 except if blood pressure goes >150 systolic   - IVF  once NPO  - opiate induced constipation regimen   - encouraging incentive spirometry   -c/w local wound care per ortho   -DVT prophylaxis and Pain meds as per Ortho team   -PT/OT and weight bearing per ortho     Paroxysmal Afib: will continue with  Metoprolol Tartrate 50 mg 2 times a day with holding parameters   On aspirin 325 mg once a day, patient's [id number] Score is 2, not on anticoagulation    Hx of HLD: will continue with atorvastatin 10 mg once a day    BPH: Will continue with tamsulosin 0.4 mg once a day, will monitor for retention     Chronic left leg wound: seen by vascular, will continue with wound care.     Leucocytosis: Likely reactive form fracture, no focus of infection, no fever, no chills.     valgus deformities of his feet: he follow with podiatry     Patient denies Hx of exertional dysnea or chest pain,  no personal or family hx of easy bleeding, Patient's METS Score is 3 to 4  and RCRI is 1, patient labs, EKG reviewed, as per him his recent TTE was good, patient is at intermediate risk for perioperative cardiovascular complication and is optimized from the medicine point of view for planned procedure, cardiology has been consulted for further optimization as he follows with [facility] cardiology.

## 2024-08-28 NOTE — H&P ADULT - PROBLEM SELECTOR PLAN 1
1. ortho admit   2. pain control   3. bedrest  4. OR tomorrow for ORIF   5. Will need med optimization

## 2024-08-29 ENCOUNTER — TRANSCRIPTION ENCOUNTER (OUTPATIENT)
Age: 69
End: 2024-08-29

## 2024-08-29 LAB
ALBUMIN SERPL ELPH-MCNC: 3.4 G/DL — SIGNIFICANT CHANGE UP (ref 3.3–5.2)
ALP SERPL-CCNC: 81 U/L — SIGNIFICANT CHANGE UP (ref 40–120)
ALT FLD-CCNC: 11 U/L — SIGNIFICANT CHANGE UP
ANION GAP SERPL CALC-SCNC: 13 MMOL/L — SIGNIFICANT CHANGE UP (ref 5–17)
AST SERPL-CCNC: 22 U/L — SIGNIFICANT CHANGE UP
BILIRUB SERPL-MCNC: 1.6 MG/DL — SIGNIFICANT CHANGE UP (ref 0.4–2)
BLD GP AB SCN SERPL QL: SIGNIFICANT CHANGE UP
BUN SERPL-MCNC: 26.2 MG/DL — HIGH (ref 8–20)
CALCIUM SERPL-MCNC: 8.4 MG/DL — SIGNIFICANT CHANGE UP (ref 8.4–10.5)
CHLORIDE SERPL-SCNC: 102 MMOL/L — SIGNIFICANT CHANGE UP (ref 96–108)
CO2 SERPL-SCNC: 24 MMOL/L — SIGNIFICANT CHANGE UP (ref 22–29)
CREAT SERPL-MCNC: 0.97 MG/DL — SIGNIFICANT CHANGE UP (ref 0.5–1.3)
EGFR: 84 ML/MIN/1.73M2 — SIGNIFICANT CHANGE UP
GLUCOSE SERPL-MCNC: 124 MG/DL — HIGH (ref 70–99)
HCT VFR BLD CALC: 42 % — SIGNIFICANT CHANGE UP (ref 39–50)
HGB BLD-MCNC: 13.6 G/DL — SIGNIFICANT CHANGE UP (ref 13–17)
MAGNESIUM SERPL-MCNC: 1.7 MG/DL — SIGNIFICANT CHANGE UP (ref 1.6–2.6)
MCHC RBC-ENTMCNC: 30.8 PG — SIGNIFICANT CHANGE UP (ref 27–34)
MCHC RBC-ENTMCNC: 32.4 GM/DL — SIGNIFICANT CHANGE UP (ref 32–36)
MCV RBC AUTO: 95 FL — SIGNIFICANT CHANGE UP (ref 80–100)
PLATELET # BLD AUTO: 152 K/UL — SIGNIFICANT CHANGE UP (ref 150–400)
POTASSIUM SERPL-MCNC: 4.6 MMOL/L — SIGNIFICANT CHANGE UP (ref 3.5–5.3)
POTASSIUM SERPL-SCNC: 4.6 MMOL/L — SIGNIFICANT CHANGE UP (ref 3.5–5.3)
PROT SERPL-MCNC: 6.3 G/DL — LOW (ref 6.6–8.7)
RBC # BLD: 4.42 M/UL — SIGNIFICANT CHANGE UP (ref 4.2–5.8)
RBC # FLD: 14.6 % — HIGH (ref 10.3–14.5)
SODIUM SERPL-SCNC: 139 MMOL/L — SIGNIFICANT CHANGE UP (ref 135–145)
WBC # BLD: 9.95 K/UL — SIGNIFICANT CHANGE UP (ref 3.8–10.5)
WBC # FLD AUTO: 9.95 K/UL — SIGNIFICANT CHANGE UP (ref 3.8–10.5)

## 2024-08-29 PROCEDURE — 73552 X-RAY EXAM OF FEMUR 2/>: CPT | Mod: 26,LT

## 2024-08-29 PROCEDURE — 99232 SBSQ HOSP IP/OBS MODERATE 35: CPT

## 2024-08-29 PROCEDURE — 73562 X-RAY EXAM OF KNEE 3: CPT | Mod: 26,LT

## 2024-08-29 PROCEDURE — 27507 TREATMENT OF THIGH FRACTURE: CPT | Mod: LT

## 2024-08-29 DEVICE — IMPLANTABLE DEVICE: Type: IMPLANTABLE DEVICE | Site: LEFT | Status: FUNCTIONAL

## 2024-08-29 RX ORDER — SODIUM CHLORIDE 9 MG/ML
1000 INJECTION INTRAMUSCULAR; INTRAVENOUS; SUBCUTANEOUS
Refills: 0 | Status: DISCONTINUED | OUTPATIENT
Start: 2024-08-29 | End: 2024-09-05

## 2024-08-29 RX ORDER — ACETAMINOPHEN 325 MG/1
975 TABLET ORAL EVERY 8 HOURS
Refills: 0 | Status: DISCONTINUED | OUTPATIENT
Start: 2024-08-29 | End: 2024-09-05

## 2024-08-29 RX ORDER — OXYCODONE HYDROCHLORIDE 5 MG/1
10 TABLET ORAL EVERY 4 HOURS
Refills: 0 | Status: DISCONTINUED | OUTPATIENT
Start: 2024-08-29 | End: 2024-09-05

## 2024-08-29 RX ORDER — CEFAZOLIN SODIUM 2 G/100ML
2000 INJECTION, SOLUTION INTRAVENOUS
Refills: 0 | Status: COMPLETED | OUTPATIENT
Start: 2024-08-29 | End: 2024-08-29

## 2024-08-29 RX ORDER — APIXABAN 5 MG/1
2.5 TABLET, FILM COATED ORAL
Refills: 0 | Status: COMPLETED | OUTPATIENT
Start: 2024-08-30 | End: 2024-09-01

## 2024-08-29 RX ORDER — APIXABAN 5 MG/1
5 TABLET, FILM COATED ORAL
Refills: 0 | Status: DISCONTINUED | OUTPATIENT
Start: 2024-09-02 | End: 2024-09-05

## 2024-08-29 RX ORDER — KETOROLAC TROMETHAMINE 30 MG/ML
15 INJECTION, SOLUTION INTRAMUSCULAR EVERY 6 HOURS
Refills: 0 | Status: DISCONTINUED | OUTPATIENT
Start: 2024-08-29 | End: 2024-08-29

## 2024-08-29 RX ORDER — FENTANYL CITRATE 50 UG/ML
25 INJECTION INTRAMUSCULAR; INTRAVENOUS
Refills: 0 | Status: DISCONTINUED | OUTPATIENT
Start: 2024-08-29 | End: 2024-08-29

## 2024-08-29 RX ORDER — ONDANSETRON 2 MG/ML
4 INJECTION, SOLUTION INTRAMUSCULAR; INTRAVENOUS EVERY 6 HOURS
Refills: 0 | Status: DISCONTINUED | OUTPATIENT
Start: 2024-08-29 | End: 2024-09-05

## 2024-08-29 RX ORDER — CEFAZOLIN SODIUM 2 G/100ML
2000 INJECTION, SOLUTION INTRAVENOUS ONCE
Refills: 0 | Status: COMPLETED | OUTPATIENT
Start: 2024-08-29 | End: 2024-08-29

## 2024-08-29 RX ORDER — TAMSULOSIN HYDROCHLORIDE 0.4 MG/1
0.4 CAPSULE ORAL AT BEDTIME
Refills: 0 | Status: DISCONTINUED | OUTPATIENT
Start: 2024-08-29 | End: 2024-09-05

## 2024-08-29 RX ORDER — FENTANYL CITRATE 50 UG/ML
50 INJECTION INTRAMUSCULAR; INTRAVENOUS
Refills: 0 | Status: DISCONTINUED | OUTPATIENT
Start: 2024-08-29 | End: 2024-08-29

## 2024-08-29 RX ORDER — OXYCODONE HYDROCHLORIDE 5 MG/1
5 TABLET ORAL EVERY 4 HOURS
Refills: 0 | Status: DISCONTINUED | OUTPATIENT
Start: 2024-08-29 | End: 2024-09-05

## 2024-08-29 RX ORDER — METOPROLOL TARTRATE 100 MG/1
25 TABLET ORAL
Refills: 0 | Status: DISCONTINUED | OUTPATIENT
Start: 2024-08-29 | End: 2024-09-05

## 2024-08-29 RX ADMIN — ACETAMINOPHEN 975 MILLIGRAM(S): 325 TABLET ORAL at 14:58

## 2024-08-29 RX ADMIN — ACETAMINOPHEN 975 MILLIGRAM(S): 325 TABLET ORAL at 22:30

## 2024-08-29 RX ADMIN — SODIUM CHLORIDE 75 MILLILITER(S): 9 INJECTION INTRAMUSCULAR; INTRAVENOUS; SUBCUTANEOUS at 00:47

## 2024-08-29 RX ADMIN — Medication 20 MILLIGRAM(S): at 21:29

## 2024-08-29 RX ADMIN — Medication 1 APPLICATION(S): at 05:01

## 2024-08-29 RX ADMIN — CEFAZOLIN SODIUM 2000 MILLIGRAM(S): 2 INJECTION, SOLUTION INTRAVENOUS at 23:34

## 2024-08-29 RX ADMIN — ACETAMINOPHEN 975 MILLIGRAM(S): 325 TABLET ORAL at 14:28

## 2024-08-29 RX ADMIN — Medication 75 MILLILITER(S): at 12:34

## 2024-08-29 RX ADMIN — Medication 2000 MILLILITER(S): at 11:30

## 2024-08-29 RX ADMIN — METOPROLOL TARTRATE 25 MILLIGRAM(S): 100 TABLET ORAL at 05:02

## 2024-08-29 RX ADMIN — TAMSULOSIN HYDROCHLORIDE 0.4 MILLIGRAM(S): 0.4 CAPSULE ORAL at 21:29

## 2024-08-29 RX ADMIN — CEFAZOLIN SODIUM 2000 MILLIGRAM(S): 2 INJECTION, SOLUTION INTRAVENOUS at 17:32

## 2024-08-29 RX ADMIN — ACETAMINOPHEN 975 MILLIGRAM(S): 325 TABLET ORAL at 21:30

## 2024-08-29 RX ADMIN — Medication 50 GRAM(S): at 12:24

## 2024-08-29 RX ADMIN — METOPROLOL TARTRATE 25 MILLIGRAM(S): 100 TABLET ORAL at 17:31

## 2024-08-29 RX ADMIN — SODIUM CHLORIDE 85 MILLILITER(S): 9 INJECTION INTRAMUSCULAR; INTRAVENOUS; SUBCUTANEOUS at 21:29

## 2024-08-29 NOTE — BRIEF OPERATIVE NOTE - OPERATION/FINDINGS
spiral fracture with large butterfly fragment    implants: Synthes VA Condylar femur plate 16 hole. 5.0 mm locking screws

## 2024-08-29 NOTE — DISCHARGE NOTE PROVIDER - CARE PROVIDER_API CALL
Amari Campbell  Orthopaedic Surgery  [address] [place] [id number]  Phone: [phone]  Fax: [phone]  Follow Up Time:

## 2024-08-29 NOTE — DISCHARGE NOTE PROVIDER - NSDCCPCAREPLAN_GEN_ALL_CORE_FT
PRINCIPAL DISCHARGE DIAGNOSIS  Diagnosis: Closed fracture of left distal femur  Assessment and Plan of Treatment:

## 2024-08-29 NOTE — DISCHARGE NOTE PROVIDER - NSDCFUADDAPPT_GEN_ALL_CORE_FT
Patient is to follow-up with there own vascular surgeon regarding left lower extremity within 1 week  Patient is to follow-up with cardiologist and primary care physician 1-2 weeks postop

## 2024-08-29 NOTE — DISCHARGE NOTE PROVIDER - NSDCMRMEDTOKEN_GEN_ALL_CORE_FT
apixaban 5 mg oral tablet: 1 tab(s) orally every 12 hours  aspirin 81 mg oral tablet: 1 tab(s) orally once a day on Mondays, Wednesdays, and Fridays.  atorvastatin 20 mg oral tablet: 1 tab(s) orally once a day (at bedtime)  Garlic oral tablet: 1 tab(s) orally once a day  metoprolol tartrate 25 mg oral tablet: 1 tab(s) orally every 12 hours  Multiple Vitamins oral tablet: 1 tab(s) orally once a day  tamsulosin 0.4 mg oral capsule: 1 cap(s) orally once a day   acetaminophen 325 mg oral tablet: 3 tab(s) orally every 8 hours  apixaban 5 mg oral tablet: 1 tab(s) orally every 12 hours  aspirin 81 mg oral tablet: 1 tab(s) orally once a day on Mondays, Wednesdays, and Fridays.  atorvastatin 20 mg oral tablet: 1 tab(s) orally once a day (at bedtime)  bisacodyl 10 mg rectal suppository: 1 suppository(ies) rectal once a day As needed If no bowel movement  Garlic oral tablet: 1 tab(s) orally once a day  metoprolol tartrate 25 mg oral tablet: 1 tab(s) orally every 12 hours  Multiple Vitamins oral tablet: 1 tab(s) orally once a day  oxyCODONE 10 mg oral tablet: 1 tab(s) orally every 4 hours As needed Moderate Pain (4 - 6)  oxyCODONE 5 mg oral tablet: 1 tab(s) orally every 4 hours As needed Mild Pain (1 - 3)  Senna S 50 mg-8.6 mg oral tablet: 2 tab(s) orally once a day (at bedtime) as needed for  constipation  tamsulosin 0.4 mg oral capsule: 1 cap(s) orally once a day

## 2024-08-29 NOTE — DISCHARGE NOTE PROVIDER - NSDCFUADDINST_GEN_ALL_CORE_FT
The patient will be seen in the office between 1-2 weeks wound check. Patient may NOT shower until post op day 10. May remove mepilex bandage at that time.  The patient will contact the office if the wound becomes red, has increasing pain, develops bleeding or discharge, an injury occurs, or has other concerns. The patient will continue Eliquis for DVTP. The patient will take oxycodone and tylenol for pain control and titrate according to prescription and patient needs. The patient is toe touch weight bearing on the left LE extremity. The patient is recommended to elevated the affected extremity to reduce swelling.   Patient is to follow up with vascular team, cardiology team and his Primary care doctor within 2 weeks after discharge The patient will be seen in the office between 1-2 weeks wound check. Patient may NOT shower until post op day 10. May remove mepilex bandage at that time.  The patient will contact the office if the wound becomes red, has increasing pain, develops bleeding or discharge, an injury occurs, or has other concerns. The patient will continue Eliquis for DVTP. The patient will take oxycodone and tylenol for pain control and titrate according to prescription and patient needs. The patient is NON-WEIGHT BEARING on the left LE extremity. The patient is recommended to elevated the affected extremity to reduce swelling.   Patient is to follow up with vascular team, cardiology team and his Primary care doctor within 2 weeks after discharge The patient will be seen in the office between 1-2 weeks wound check. Patient may NOT shower until post op day 10. May remove mepilex bandage at that time. new Mepilex dressings placed to Incisions prior to discharge to rehab on 9/4.  The patient will contact the office if the wound becomes red, has increasing pain, develops bleeding or discharge, an injury occurs, or has other concerns. The patient will continue Eliquis for DVTP. The patient will take oxycodone and tylenol for pain control and titrate according to prescription and patient needs. The patient is NON-WEIGHT BEARING on the left LE extremity. The patient is recommended to elevated the affected extremity to reduce swelling.   Patient is to follow up with vascular team, cardiology team and his Primary care doctor within 2 weeks after discharge  Recommend to continue compressive dressings to the lower extremity   - Avoid adhesives to the tibial regions  - Apply adaptic, 4x4, Kerlix, ACE, change every 24-48 hours  - Patient encourage to follow up with Dr. Collazo post rehabilitation for resumption of UNNA boot therapy  - If patient wishes, he may follow up in our office, with Dr REGGIE Del Castillo      The patient will be seen in the office between 1-2 weeks wound check. Patient may NOT shower until post op day 15, May remove bandage at that time. IF dressing becomes saturated or soiled may change dressing with gauze and tegaderm until dressing clean and dry. The patient will contact the office if the wound becomes red, has increasing pain, develops bleeding or discharge, an injury occurs, or has other concerns. The patient will continue Eliquis for DVTP. The patient will take oxycodone and tylenol for pain control and titrate according to prescription and patient needs. The patient is NON-WEIGHT BEARING on the left LE extremity. The patient is recommended to elevated the affected extremity to reduce swelling.   Patient is to follow up with vascular team, cardiology team and his Primary care doctor within 2 weeks after discharge  Recommend to continue compressive dressings to the lower extremity   - Avoid adhesives to the tibial regions  - Apply adaptic, 4x4, Kerlix, ACE, change every 24-48 hours  - Patient encourage to follow up with Dr. Collazo post rehabilitation for resumption of UNNA boot therapy  - If patient wishes, he may follow up in our office, with Dr REGGIE Del Castillo   - Follow-up for repeat X-rays in 1 week with Dr. Campbell.

## 2024-08-29 NOTE — DISCHARGE NOTE PROVIDER - HOSPITAL COURSE
The patient underwent a Left femur OPEN REDUCTION AND INTERNAL FIXATION on 8/29/24 for treatment of a periprosthetic fx fracture. The patient received antibiotics consistent with SCIP guidelines. The patient was medically cleared and underwent the procedure and had no intra-operative complications. Post-operatively, the patient was seen by medicine and PT. The patient received Eliquis for DVTP. The patient received pain medications per orthopedic pain management protocol and the pain was appropriately controlled. Patient was evaluated by PT and instructed on gait training. The patient was ToeTouch-weight bearing Left lower leg The patient did not have any post-operative medical complications. The patient was discharged in stable condition. The patient underwent a Left femur OPEN REDUCTION AND INTERNAL FIXATION on 8/29/24 for treatment of a periprosthetic fx fracture. The patient received antibiotics consistent with SCIP guidelines. The patient was medically cleared and underwent the procedure and had no intra-operative complications. Post-operatively, the patient was seen by medicine and PT. The patient received Eliquis for DVTP. The patient received pain medications per orthopedic pain management protocol and the pain was appropriately controlled. Patient was evaluated by PT and instructed on gait training. The patient is to remain Non-weight bearing Left lower leg. The patient did not have any post-operative medical complications. The patient was discharged in stable condition.

## 2024-08-29 NOTE — BRIEF OPERATIVE NOTE - COMMENTS
post-op plan: TTWB x 6 weeks then WBAT, PT/OT, ancef per protocol, contralateral SCD, LMWH (or equivalent) x 4 weeks post-op, repeat x-rays at 2,6,12 weeks post op

## 2024-08-30 LAB
ANION GAP SERPL CALC-SCNC: 16 MMOL/L — SIGNIFICANT CHANGE UP (ref 5–17)
BASOPHILS # BLD AUTO: 0.04 K/UL — SIGNIFICANT CHANGE UP (ref 0–0.2)
BASOPHILS NFR BLD AUTO: 0.3 % — SIGNIFICANT CHANGE UP (ref 0–2)
BUN SERPL-MCNC: 25 MG/DL — HIGH (ref 8–20)
CALCIUM SERPL-MCNC: 8 MG/DL — LOW (ref 8.4–10.5)
CHLORIDE SERPL-SCNC: 103 MMOL/L — SIGNIFICANT CHANGE UP (ref 96–108)
CO2 SERPL-SCNC: 21 MMOL/L — LOW (ref 22–29)
CREAT SERPL-MCNC: 0.8 MG/DL — SIGNIFICANT CHANGE UP (ref 0.5–1.3)
EGFR: 95 ML/MIN/1.73M2 — SIGNIFICANT CHANGE UP
EOSINOPHIL # BLD AUTO: 0.01 K/UL — SIGNIFICANT CHANGE UP (ref 0–0.5)
EOSINOPHIL NFR BLD AUTO: 0.1 % — SIGNIFICANT CHANGE UP (ref 0–6)
GLUCOSE SERPL-MCNC: 142 MG/DL — HIGH (ref 70–99)
HCT VFR BLD CALC: 36.5 % — LOW (ref 39–50)
HGB BLD-MCNC: 11.3 G/DL — LOW (ref 13–17)
IMM GRANULOCYTES NFR BLD AUTO: 0.7 % — SIGNIFICANT CHANGE UP (ref 0–0.9)
LYMPHOCYTES # BLD AUTO: 0.85 K/UL — LOW (ref 1–3.3)
LYMPHOCYTES # BLD AUTO: 5.7 % — LOW (ref 13–44)
MCHC RBC-ENTMCNC: 30.6 PG — SIGNIFICANT CHANGE UP (ref 27–34)
MCHC RBC-ENTMCNC: 31 GM/DL — LOW (ref 32–36)
MCV RBC AUTO: 98.9 FL — SIGNIFICANT CHANGE UP (ref 80–100)
MONOCYTES # BLD AUTO: 1.35 K/UL — HIGH (ref 0–0.9)
MONOCYTES NFR BLD AUTO: 9 % — SIGNIFICANT CHANGE UP (ref 2–14)
NEUTROPHILS # BLD AUTO: 12.6 K/UL — HIGH (ref 1.8–7.4)
NEUTROPHILS NFR BLD AUTO: 84.2 % — HIGH (ref 43–77)
PLATELET # BLD AUTO: 168 K/UL — SIGNIFICANT CHANGE UP (ref 150–400)
POTASSIUM SERPL-MCNC: 4.8 MMOL/L — SIGNIFICANT CHANGE UP (ref 3.5–5.3)
POTASSIUM SERPL-SCNC: 4.8 MMOL/L — SIGNIFICANT CHANGE UP (ref 3.5–5.3)
RBC # BLD: 3.69 M/UL — LOW (ref 4.2–5.8)
RBC # FLD: 14.8 % — HIGH (ref 10.3–14.5)
SODIUM SERPL-SCNC: 140 MMOL/L — SIGNIFICANT CHANGE UP (ref 135–145)
WBC # BLD: 14.95 K/UL — HIGH (ref 3.8–10.5)
WBC # FLD AUTO: 14.95 K/UL — HIGH (ref 3.8–10.5)

## 2024-08-30 PROCEDURE — 99232 SBSQ HOSP IP/OBS MODERATE 35: CPT

## 2024-08-30 RX ADMIN — ACETAMINOPHEN 975 MILLIGRAM(S): 325 TABLET ORAL at 22:55

## 2024-08-30 RX ADMIN — TAMSULOSIN HYDROCHLORIDE 0.4 MILLIGRAM(S): 0.4 CAPSULE ORAL at 21:55

## 2024-08-30 RX ADMIN — APIXABAN 2.5 MILLIGRAM(S): 5 TABLET, FILM COATED ORAL at 17:45

## 2024-08-30 RX ADMIN — ACETAMINOPHEN 975 MILLIGRAM(S): 325 TABLET ORAL at 06:01

## 2024-08-30 RX ADMIN — OXYCODONE HYDROCHLORIDE 10 MILLIGRAM(S): 5 TABLET ORAL at 07:58

## 2024-08-30 RX ADMIN — APIXABAN 2.5 MILLIGRAM(S): 5 TABLET, FILM COATED ORAL at 05:01

## 2024-08-30 RX ADMIN — ACETAMINOPHEN 975 MILLIGRAM(S): 325 TABLET ORAL at 13:28

## 2024-08-30 RX ADMIN — OXYCODONE HYDROCHLORIDE 10 MILLIGRAM(S): 5 TABLET ORAL at 08:58

## 2024-08-30 RX ADMIN — ACETAMINOPHEN 975 MILLIGRAM(S): 325 TABLET ORAL at 05:01

## 2024-08-30 RX ADMIN — ACETAMINOPHEN 975 MILLIGRAM(S): 325 TABLET ORAL at 14:28

## 2024-08-30 RX ADMIN — ACETAMINOPHEN 975 MILLIGRAM(S): 325 TABLET ORAL at 21:55

## 2024-08-30 RX ADMIN — Medication 20 MILLIGRAM(S): at 21:55

## 2024-08-30 NOTE — PHYSICAL THERAPY INITIAL EVALUATION ADULT - ADDITIONAL COMMENTS
as per pt: used a straight cane for ambulation, has one step to get into house and no stairs at home, owns RW

## 2024-08-30 NOTE — PHYSICAL THERAPY INITIAL EVALUATION ADULT - LEVEL OF INDEPENDENCE: BED TO CHAIR, REHAB EVAL
due to bleeding through the dressing at the surgical site, RN called in pt left in RN care/unable to perform

## 2024-08-30 NOTE — PHYSICAL THERAPY INITIAL EVALUATION ADULT - DIAGNOSIS, PT EVAL
Impaired Functional Mobility due to left  LE strength deficits, decreased ROM, impaired sitting balance.

## 2024-08-30 NOTE — PHYSICAL THERAPY INITIAL EVALUATION ADULT - ACTIVE RANGE OF MOTION EXAMINATION, REHAB EVAL
left LE (+) ACE wrap 2-/5 movement active movement, grossly assessed, resistance not applied/bilateral upper extremity Active ROM was WFL (within functional limits)/Right LE Active ROM was WFL (within functional limits)

## 2024-08-30 NOTE — PHYSICAL THERAPY INITIAL EVALUATION ADULT - PERTINENT HX OF CURRENT PROBLEM, REHAB EVAL
as per medical chart: presenting to the emergency department with a chief complaint of Left knee pain.  Patient was playing ball when he misstepped he rolled over some bushes on to his left side. Following the fall pain felt immediate pain in left knee and unable to weight bear on LLE.

## 2024-08-31 LAB
ANION GAP SERPL CALC-SCNC: 11 MMOL/L — SIGNIFICANT CHANGE UP (ref 5–17)
BUN SERPL-MCNC: 24 MG/DL — HIGH (ref 8–20)
CALCIUM SERPL-MCNC: 8 MG/DL — LOW (ref 8.4–10.5)
CHLORIDE SERPL-SCNC: 108 MMOL/L — SIGNIFICANT CHANGE UP (ref 96–108)
CO2 SERPL-SCNC: 23 MMOL/L — SIGNIFICANT CHANGE UP (ref 22–29)
CREAT SERPL-MCNC: 0.64 MG/DL — SIGNIFICANT CHANGE UP (ref 0.5–1.3)
EGFR: 102 ML/MIN/1.73M2 — SIGNIFICANT CHANGE UP
GLUCOSE SERPL-MCNC: 109 MG/DL — HIGH (ref 70–99)
MAGNESIUM SERPL-MCNC: 1.9 MG/DL — SIGNIFICANT CHANGE UP (ref 1.6–2.6)
PHOSPHATE SERPL-MCNC: 2.5 MG/DL — SIGNIFICANT CHANGE UP (ref 2.4–4.7)
POTASSIUM SERPL-MCNC: 4.2 MMOL/L — SIGNIFICANT CHANGE UP (ref 3.5–5.3)
POTASSIUM SERPL-SCNC: 4.2 MMOL/L — SIGNIFICANT CHANGE UP (ref 3.5–5.3)
SODIUM SERPL-SCNC: 142 MMOL/L — SIGNIFICANT CHANGE UP (ref 135–145)

## 2024-08-31 RX ADMIN — METOPROLOL TARTRATE 25 MILLIGRAM(S): 100 TABLET ORAL at 18:44

## 2024-08-31 RX ADMIN — ACETAMINOPHEN 975 MILLIGRAM(S): 325 TABLET ORAL at 14:05

## 2024-08-31 RX ADMIN — APIXABAN 2.5 MILLIGRAM(S): 5 TABLET, FILM COATED ORAL at 05:42

## 2024-08-31 RX ADMIN — ACETAMINOPHEN 975 MILLIGRAM(S): 325 TABLET ORAL at 12:55

## 2024-08-31 RX ADMIN — APIXABAN 2.5 MILLIGRAM(S): 5 TABLET, FILM COATED ORAL at 18:44

## 2024-08-31 RX ADMIN — Medication 20 MILLIGRAM(S): at 21:10

## 2024-08-31 RX ADMIN — METOPROLOL TARTRATE 25 MILLIGRAM(S): 100 TABLET ORAL at 01:45

## 2024-08-31 RX ADMIN — TAMSULOSIN HYDROCHLORIDE 0.4 MILLIGRAM(S): 0.4 CAPSULE ORAL at 21:10

## 2024-08-31 RX ADMIN — SODIUM CHLORIDE 85 MILLILITER(S): 9 INJECTION INTRAMUSCULAR; INTRAVENOUS; SUBCUTANEOUS at 21:11

## 2024-08-31 RX ADMIN — ACETAMINOPHEN 975 MILLIGRAM(S): 325 TABLET ORAL at 21:09

## 2024-08-31 RX ADMIN — ACETAMINOPHEN 975 MILLIGRAM(S): 325 TABLET ORAL at 05:42

## 2024-08-31 RX ADMIN — ACETAMINOPHEN 975 MILLIGRAM(S): 325 TABLET ORAL at 06:25

## 2024-08-31 RX ADMIN — ACETAMINOPHEN 975 MILLIGRAM(S): 325 TABLET ORAL at 22:09

## 2024-08-31 NOTE — CHART NOTE - NSCHARTNOTEFT_GEN_A_CORE
PA NOTE-MEDICINE    Called by RN due to Pt experiencing an episode of Afib RVR x a few seconds.  Pt Normally has Afib but Normal rate.   Pt VSS  Asymptomatic  Gave Dose of Metoprolol 25 mg po x 1 Now   Added Mag/Phos to AM BMP    Continue to monitor Pt   Recall PA for any additional Episodes or for any changes in Pt Status

## 2024-09-01 LAB
ANION GAP SERPL CALC-SCNC: 9 MMOL/L — SIGNIFICANT CHANGE UP (ref 5–17)
BUN SERPL-MCNC: 21.5 MG/DL — HIGH (ref 8–20)
CALCIUM SERPL-MCNC: 8.1 MG/DL — LOW (ref 8.4–10.5)
CHLORIDE SERPL-SCNC: 110 MMOL/L — HIGH (ref 96–108)
CO2 SERPL-SCNC: 24 MMOL/L — SIGNIFICANT CHANGE UP (ref 22–29)
CREAT SERPL-MCNC: 0.71 MG/DL — SIGNIFICANT CHANGE UP (ref 0.5–1.3)
EGFR: 99 ML/MIN/1.73M2 — SIGNIFICANT CHANGE UP
GLUCOSE SERPL-MCNC: 113 MG/DL — HIGH (ref 70–99)
POTASSIUM SERPL-MCNC: 3.9 MMOL/L — SIGNIFICANT CHANGE UP (ref 3.5–5.3)
POTASSIUM SERPL-SCNC: 3.9 MMOL/L — SIGNIFICANT CHANGE UP (ref 3.5–5.3)
SODIUM SERPL-SCNC: 143 MMOL/L — SIGNIFICANT CHANGE UP (ref 135–145)

## 2024-09-01 RX ADMIN — ACETAMINOPHEN 975 MILLIGRAM(S): 325 TABLET ORAL at 07:12

## 2024-09-01 RX ADMIN — ACETAMINOPHEN 975 MILLIGRAM(S): 325 TABLET ORAL at 06:12

## 2024-09-01 RX ADMIN — APIXABAN 2.5 MILLIGRAM(S): 5 TABLET, FILM COATED ORAL at 06:12

## 2024-09-01 RX ADMIN — ACETAMINOPHEN 975 MILLIGRAM(S): 325 TABLET ORAL at 21:13

## 2024-09-01 RX ADMIN — METOPROLOL TARTRATE 25 MILLIGRAM(S): 100 TABLET ORAL at 17:54

## 2024-09-01 RX ADMIN — SODIUM CHLORIDE 85 MILLILITER(S): 9 INJECTION INTRAMUSCULAR; INTRAVENOUS; SUBCUTANEOUS at 21:13

## 2024-09-01 RX ADMIN — ACETAMINOPHEN 975 MILLIGRAM(S): 325 TABLET ORAL at 17:30

## 2024-09-01 RX ADMIN — Medication 20 MILLIGRAM(S): at 21:13

## 2024-09-01 RX ADMIN — SODIUM CHLORIDE 85 MILLILITER(S): 9 INJECTION INTRAMUSCULAR; INTRAVENOUS; SUBCUTANEOUS at 06:11

## 2024-09-01 RX ADMIN — TAMSULOSIN HYDROCHLORIDE 0.4 MILLIGRAM(S): 0.4 CAPSULE ORAL at 21:13

## 2024-09-01 RX ADMIN — ACETAMINOPHEN 975 MILLIGRAM(S): 325 TABLET ORAL at 13:20

## 2024-09-01 RX ADMIN — APIXABAN 2.5 MILLIGRAM(S): 5 TABLET, FILM COATED ORAL at 17:54

## 2024-09-01 RX ADMIN — ACETAMINOPHEN 975 MILLIGRAM(S): 325 TABLET ORAL at 21:00

## 2024-09-01 RX ADMIN — METOPROLOL TARTRATE 25 MILLIGRAM(S): 100 TABLET ORAL at 06:13

## 2024-09-02 RX ADMIN — ACETAMINOPHEN 975 MILLIGRAM(S): 325 TABLET ORAL at 13:04

## 2024-09-02 RX ADMIN — ACETAMINOPHEN 975 MILLIGRAM(S): 325 TABLET ORAL at 21:44

## 2024-09-02 RX ADMIN — ACETAMINOPHEN 975 MILLIGRAM(S): 325 TABLET ORAL at 14:00

## 2024-09-02 RX ADMIN — Medication 20 MILLIGRAM(S): at 21:44

## 2024-09-02 RX ADMIN — APIXABAN 5 MILLIGRAM(S): 5 TABLET, FILM COATED ORAL at 17:24

## 2024-09-02 RX ADMIN — ACETAMINOPHEN 975 MILLIGRAM(S): 325 TABLET ORAL at 22:51

## 2024-09-02 RX ADMIN — ACETAMINOPHEN 975 MILLIGRAM(S): 325 TABLET ORAL at 05:41

## 2024-09-02 RX ADMIN — TAMSULOSIN HYDROCHLORIDE 0.4 MILLIGRAM(S): 0.4 CAPSULE ORAL at 21:45

## 2024-09-02 RX ADMIN — ACETAMINOPHEN 975 MILLIGRAM(S): 325 TABLET ORAL at 05:37

## 2024-09-02 RX ADMIN — METOPROLOL TARTRATE 25 MILLIGRAM(S): 100 TABLET ORAL at 17:24

## 2024-09-02 RX ADMIN — APIXABAN 5 MILLIGRAM(S): 5 TABLET, FILM COATED ORAL at 05:37

## 2024-09-02 RX ADMIN — METOPROLOL TARTRATE 25 MILLIGRAM(S): 100 TABLET ORAL at 05:37

## 2024-09-03 RX ORDER — TIZANIDINE 4 MG/1
2 TABLET ORAL ONCE
Refills: 0 | Status: COMPLETED | OUTPATIENT
Start: 2024-09-03 | End: 2024-09-03

## 2024-09-03 RX ORDER — ACETAMINOPHEN 325 MG/1
3 TABLET ORAL
Qty: 0 | Refills: 0 | DISCHARGE
Start: 2024-09-03

## 2024-09-03 RX ORDER — OXYCODONE HYDROCHLORIDE 5 MG/1
1 TABLET ORAL
Qty: 0 | Refills: 0 | DISCHARGE
Start: 2024-09-03

## 2024-09-03 RX ADMIN — TIZANIDINE 2 MILLIGRAM(S): 4 TABLET ORAL at 17:28

## 2024-09-03 RX ADMIN — APIXABAN 5 MILLIGRAM(S): 5 TABLET, FILM COATED ORAL at 17:28

## 2024-09-03 RX ADMIN — ACETAMINOPHEN 975 MILLIGRAM(S): 325 TABLET ORAL at 06:01

## 2024-09-03 RX ADMIN — ACETAMINOPHEN 975 MILLIGRAM(S): 325 TABLET ORAL at 21:30

## 2024-09-03 RX ADMIN — ACETAMINOPHEN 975 MILLIGRAM(S): 325 TABLET ORAL at 14:32

## 2024-09-03 RX ADMIN — ACETAMINOPHEN 975 MILLIGRAM(S): 325 TABLET ORAL at 15:00

## 2024-09-03 RX ADMIN — ACETAMINOPHEN 975 MILLIGRAM(S): 325 TABLET ORAL at 06:29

## 2024-09-03 RX ADMIN — METOPROLOL TARTRATE 25 MILLIGRAM(S): 100 TABLET ORAL at 17:28

## 2024-09-03 RX ADMIN — TAMSULOSIN HYDROCHLORIDE 0.4 MILLIGRAM(S): 0.4 CAPSULE ORAL at 21:30

## 2024-09-03 RX ADMIN — Medication 20 MILLIGRAM(S): at 21:30

## 2024-09-03 RX ADMIN — ACETAMINOPHEN 975 MILLIGRAM(S): 325 TABLET ORAL at 22:26

## 2024-09-03 RX ADMIN — APIXABAN 5 MILLIGRAM(S): 5 TABLET, FILM COATED ORAL at 06:01

## 2024-09-03 RX ADMIN — METOPROLOL TARTRATE 25 MILLIGRAM(S): 100 TABLET ORAL at 06:01

## 2024-09-03 NOTE — PROGRESS NOTE ADULT - ASSESSMENT
70 y/o Male with Hx of HTN, HLD, BPH, Prosomal Afib, he came in here in here for Left knee pain. Patient was playing ball when he missteped he rolled over some bushes on to his left side. Following the fall pain felt immediate pain in left knee and unable to weight bear on LLE, he is s/p L TKA in 2014, R TKA in 2013 with subsequent revision 2/2 to infection of right knee in 2018 all with with Dr. Alonzo. 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  Patient will be admitted for ORIF of femur tomorrow, medicine consulted for medical optimization.    Plan:     Left knee pain due to Periprosthetic fracture s/p Orif:      - VS stable  - abx per ortho   - c/w anti hypertensive to be restarted post op day 02 except if blood pressure goes >150 systolic   - IVF   - opiate induced constipation regimen   - encouraging incentive spirometry   -c/w local wound care per ortho   -DVT prophylaxis and Pain meds as per Ortho team   -PT/OT and weight bearing per ortho   -wound care    Paroxysmal Afib: will continue with  Metoprolol Tartrate 50 mg 2 times a day with holding parameters   On aspirin 325 mg once a day, patient's [id number] Score is 2, not on anticoagulation.     Hx of HLD: will continue with atorvastatin 10 mg once a day    BPH: Will continue with tamsulosin 0.4 mg once a day, will monitor for retention     Chronic left leg wound: seen by vascular, will continue with wound care.     Leucocytosis: Likely reactive form fracture, no focus of infection, no fever, no chills.     valgus deformities of his feet: he follow with podiatry.   
Assessment  postop periprosthetic femur fracture repair  Chronic AF with CVR on beta blocker/NOAC  Coronary artery calcification with normal perfusion on PET  Aortic sclerosis mild MR normal EF      Rec  cont beta blocker  if bleeding persists may hold AC 48 hrs then resume  please recall as needed  
68 y/o Male with Hx of HTN, HLD, BPH, Prosomal Afib, he came in here in here for Left knee pain. Patient was playing ball when he missteped he rolled over some bushes on to his left side. Following the fall pain felt immediate pain in left knee and unable to weight bear on LLE, he is s/p L TKA in 2014, R TKA in 2013 with subsequent revision 2/2 to infection of right knee in 2018 all with with Dr. Alonzo. 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  Patient will be admitted for ORIF of femur tomorrow, medicine consulted for medical optimization.    Plan:     Left knee pain due to Periprosthetic fracture s/p Orif:      - VS stable  - abx per ortho completed  - opiate induced constipation regimen discontinued as patient not taking opiates.   - encouraging incentive spirometry   -c/w local wound care per ortho   -DVT prophylaxis and Pain meds as per Ortho team   -PT/OT and weight bearing per ortho   -wound care    Paroxysmal Afib: will continue with  Metoprolol Tartrate 50 mg 2 times a day with holding parameters   On aspirin 325 mg once a day, patient's [id number] Score is 2, not on anticoagulation.     Hx of HLD: will continue with atorvastatin 10 mg once a day    BPH:  tamsulosin 0.4 mg once a day     Chronic left leg wound: seen by vascular, continue with wound care.     Leucocytosis: Likely reactive form fracture, no focus of infection, no fever, no chills.     valgus deformities of his feet: outpatient follow up  with podiatry.     Patient is stable from the medicine point of view for discharge pending PT and Ortho clearance. 
70 y/o Male with Hx of HTN, HLD, BPH, Prosomal Afib, he came in here in here for Left knee pain. Patient was playing ball when he missteped he rolled over some bushes on to his left side. Following the fall pain felt immediate pain in left knee and unable to weight bear on LLE, he is s/p L TKA in 2014, R TKA in 2013 with subsequent revision 2/2 to infection of right knee in 2018 all with with Dr. Alonzo. 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  Patient will be admitted for ORIF of femur tomorrow, medicine consulted for medical optimization.    Plan:     Left knee pain due to Periprosthetic fracture s/p Orif:      - VS stable  - abx per ortho   - opiate induced constipation regimen   - encouraging incentive spirometry   -c/w local wound care per ortho   -DVT prophylaxis and Pain meds as per Ortho team   -PT/OT and weight bearing per ortho   -wound care    Paroxysmal Afib: will continue with  Metoprolol Tartrate 50 mg 2 times a day with holding parameters   On aspirin 325 mg once a day, patient's [id number] Score is 2, not on anticoagulation.     Hx of HLD: will continue with atorvastatin 10 mg once a day    BPH: Will continue with tamsulosin 0.4 mg once a day, will monitor for retention     Chronic left leg wound: seen by vascular, will continue with wound care.     Leucocytosis: Likely reactive form fracture, no focus of infection, no fever, no chills.     valgus deformities of his feet: he follow with podiatry.     Patient is stable from the medicine point of view for discharge pending PT and Ortho eval.     Medicine team is signing off, please call as needed

## 2024-09-04 ENCOUNTER — TRANSCRIPTION ENCOUNTER (OUTPATIENT)
Age: 69
End: 2024-09-04

## 2024-09-04 RX ADMIN — ACETAMINOPHEN 975 MILLIGRAM(S): 325 TABLET ORAL at 21:07

## 2024-09-04 RX ADMIN — TAMSULOSIN HYDROCHLORIDE 0.4 MILLIGRAM(S): 0.4 CAPSULE ORAL at 21:07

## 2024-09-04 RX ADMIN — APIXABAN 5 MILLIGRAM(S): 5 TABLET, FILM COATED ORAL at 05:29

## 2024-09-04 RX ADMIN — APIXABAN 5 MILLIGRAM(S): 5 TABLET, FILM COATED ORAL at 18:06

## 2024-09-04 RX ADMIN — ACETAMINOPHEN 975 MILLIGRAM(S): 325 TABLET ORAL at 22:07

## 2024-09-04 RX ADMIN — ACETAMINOPHEN 975 MILLIGRAM(S): 325 TABLET ORAL at 14:55

## 2024-09-04 RX ADMIN — ACETAMINOPHEN 975 MILLIGRAM(S): 325 TABLET ORAL at 06:26

## 2024-09-04 RX ADMIN — ACETAMINOPHEN 975 MILLIGRAM(S): 325 TABLET ORAL at 13:26

## 2024-09-04 RX ADMIN — ACETAMINOPHEN 975 MILLIGRAM(S): 325 TABLET ORAL at 05:28

## 2024-09-04 RX ADMIN — Medication 20 MILLIGRAM(S): at 21:07

## 2024-09-04 RX ADMIN — METOPROLOL TARTRATE 25 MILLIGRAM(S): 100 TABLET ORAL at 05:28

## 2024-09-04 NOTE — DISCHARGE NOTE NURSING/CASE MANAGEMENT/SOCIAL WORK - NSDCFUADDAPPT_GEN_ALL_CORE_FT
MATTHEW spoke with Amari, admissions coordinator for [facility] ([phone]). Amari reported they can accept the patient today at 4pm for subacute rehab and confirmed authorization has been obtained. Patient reported he is in agreement to attend [facility] today for subacute rehab at 4pm.   Patient is to follow-up with there own vascular surgeon regarding left lower extremity within 1 week  Patient is to follow-up with cardiologist and primary care physician 1-2 weeks postop

## 2024-09-04 NOTE — DISCHARGE NOTE NURSING/CASE MANAGEMENT/SOCIAL WORK - MODE OF TRANSPORTATION
No outpatient medications have been marked as taking for the 2/16/23 encounter [facility] Encounter)  Avoid non-prescribed ASPIRIN, OTC vitamins and NSAIDS prior to surgery  Tylenol okay PRN  Continue scheduled medications excluding DOS  Avoid smoking prior to Surgery   Avoid alcohol, illicit drugs and marijuana for 24 hours prior to DOS  NPO instructions given: no food, water or anything else by mouth after midnight prior to surgery  Shower the night before and the morning of surgery using CHG wash or antibacterial soap if CHG is not indicated  Avoid shaving for 24 hours prior to DOS  Avoid using lotions, powders, oils, makeup, hair products, etc  DOS  Patient given up to date visitor guidelines  A ride home after surgery is needed- failure to have a ride can result in cancellation  Remove jewelry and not to bring valuables DOS  Dentures and contact lenses will have to be removed for surgery  Patient understands he or she will receive a call the afternoon before surgery regarding an arrival time  If you have any changes in your health before DOS please call the surgeon's office  Patient verbalized understanding of all instructions 
Ambulance

## 2024-09-04 NOTE — DISCHARGE NOTE NURSING/CASE MANAGEMENT/SOCIAL WORK - PATIENT PORTAL LINK FT
You can access the FollowMyHealth Patient Portal offered by [facility] by registering at the following website: [email or web address]. By joining [facility]’s FollowMyHealth portal, you will also be able to view your health information using other applications (apps) compatible with our system.

## 2024-09-04 NOTE — DISCHARGE NOTE NURSING/CASE MANAGEMENT/SOCIAL WORK - NSDCPEFALRISK_GEN_ALL_CORE
For information on Fall & Injury Prevention, visit: [email or web address] OR  [email or web address] OR  https://www.cdc.gov/steadi/patient.html

## 2024-09-05 VITALS
HEART RATE: 91 BPM | RESPIRATION RATE: 18 BRPM | TEMPERATURE: 98 F | DIASTOLIC BLOOD PRESSURE: 72 MMHG | SYSTOLIC BLOOD PRESSURE: 105 MMHG | OXYGEN SATURATION: 95 %

## 2024-09-05 RX ADMIN — ACETAMINOPHEN 975 MILLIGRAM(S): 325 TABLET ORAL at 05:45

## 2024-09-05 RX ADMIN — ACETAMINOPHEN 975 MILLIGRAM(S): 325 TABLET ORAL at 06:07

## 2024-09-05 RX ADMIN — METOPROLOL TARTRATE 25 MILLIGRAM(S): 100 TABLET ORAL at 03:21

## 2024-09-05 RX ADMIN — APIXABAN 5 MILLIGRAM(S): 5 TABLET, FILM COATED ORAL at 05:44

## 2024-09-05 NOTE — PROGRESS NOTE ADULT - PROVIDER SPECIALTY LIST ADULT
Hospitalist
Hospitalist
Orthopedics
Cardiology
Hospitalist
Orthopedics
Vascular Surgery
Orthopedics

## 2024-09-05 NOTE — PROGRESS NOTE ADULT - REASON FOR ADMISSION
Left distal femur periprosthetic fx

## 2024-09-05 NOTE — PROGRESS NOTE ADULT - SUBJECTIVE AND OBJECTIVE BOX
ORTHOPEDIC POST-OP PROGRESS NOTE:    DIMAS HARDY    [id number]    History: Patient is status post left open reduction internal fixation distal femur POD # 7. Patient is doing well and is comfortable. The patient's pain is controlled using the prescribed pain medications. The patient is participating in physical therapy. Denies CP, SOB, dizziness, HA, fever/chills, numbness or tingling. No additional orthopedic complaints at this time.    Vital Signs Last 24 Hrs  T(C): 36.8 (05 Sep 2024 05:00), Max: 36.8 (05 Sep 2024 05:00)  T(F): 98.3 (05 Sep 2024 05:00), Max: 98.3 (05 Sep 2024 05:00)  HR: 85 (05 Sep 2024 05:00) (81 - 98)  BP: 111/57 (05 Sep 2024 05:00) (100/64 - 131/80)  BP(mean): --  RR: 18 (05 Sep 2024 05:00) (18 - 18)  SpO2: 91% (05 Sep 2024 05:00) (91% - 98%)    Parameters below as of 05 Sep 2024 05:00  Patient On (Oxygen Delivery Method): room air    General: Alert, awake, NAD  Physical exam: distal Dressings: dressing is clean, dry and intact- No drainage, discharge, erythema, blistering. proximal dressing with quater sized serosang staining, dressing removed, no active drainage or discharge-new gauze and tegaderm placed. The calf is supple nontender b/l. SILT.   +EHL/FHL/AT/GC. No foot drop. 2+ DP pulse. BCR. No cyanosis. compartments soft and compressible      Plan:   - DVT prophylaxis as prescribed, including use of compression devices and ankle pumps  -  Continue physical therapy  - Weight bearing status of surgical extremity: TOE TOUCH weight bearing LLE-confirmed with Dr. Campbell   - Incentive spirometry encouraged  - Pain control as clinically indicated  - Discharge planning – anticipated discharge is rehab today
Ortho Post Op Check    Name: DIMAS HARDY    MR #: [id number]    Procedure: Left ORIF femur periprosthetic fx   Surgeon: Dr Campbell    Pt comfortable without complaints, pain controlled  Denies CP, SOB, N/V    General Exam:  Vital Signs Last 24 Hrs  T(C): 36.6 (08-29-24 @ 16:05), Max: 36.6 (08-29-24 @ 16:05)  T(F): 97.8 (08-29-24 @ 16:05), Max: 97.8 (08-29-24 @ 16:05)  HR: 79 (08-29-24 @ 16:05) (79 - 79)  BP: 100/65 (08-29-24 @ 16:05) (100/65 - 100/65)  BP(mean): --  RR: 18 (08-29-24 @ 16:05) (18 - 18)  SpO2: 98% (08-29-24 @ 16:05) (98% - 98%)    General: Pt Alert and oriented, NAD, controlled pain.  Left thigh mepilex dressings x2, distal bandage remains C/D/I. Proximal bandage has nickel size amount of blood staining.   Pulses: 1+ dorsalis pedis pulse. Cap refill < 2 sec.  Sensation: at baseline.  Motor: + dorsi and plantar flexion noted.         MEDICATIONS  (STANDING):  acetaminophen     Tablet .. 975 milliGRAM(s) Oral every 8 hours  atorvastatin 20 milliGRAM(s) Oral at bedtime  ceFAZolin  Injectable. 2000 milliGRAM(s) IV Push <User Schedule>  metoprolol tartrate 25 milliGRAM(s) Oral two times a day  sodium chloride 0.9%. 1000 milliLiter(s) (85 mL/Hr) IV Continuous <Continuous>  tamsulosin 0.4 milliGRAM(s) Oral at bedtime    MEDICATIONS  (PRN):  ketorolac   Injectable 15 milliGRAM(s) IV Push every 6 hours PRN Moderate Pain (4 - 6)  magnesium hydroxide Suspension 30 milliLiter(s) Oral daily PRN Constipation  ondansetron Injectable 4 milliGRAM(s) IV Push every 6 hours PRN Nausea and/or Vomiting  oxyCODONE    IR 10 milliGRAM(s) Oral every 4 hours PRN Moderate Pain (4 - 6)  oxyCODONE    IR 5 milliGRAM(s) Oral every 4 hours PRN Mild Pain (1 - 3)      A/P: 69yMale POD#0 s/p Left ORIF femur periprosthetic fx  - Stable  - Pain Control  - DVT ppx: Eliquis half dose x 3 days then regular dose   - Post op abx: Ancef ordered   - PT eval pending  - Weight bearing status: TTWB Left LE  - Likely [facility]. Social work to start process in am 
Patient seen and eval at bedside. Patient has no complaints, pain well controlled. Patient denies numbness/tingling, working with PT getting from bed to chair and states he is going to rehab.    Vital Signs Last 24 Hrs  T(C): 36.4 (03 Sep 2024 04:38), Max: 37 (02 Sep 2024 10:58)  T(F): 97.5 (03 Sep 2024 04:38), Max: 98.6 (02 Sep 2024 10:58)  HR: 89 (03 Sep 2024 04:38) (82 - 95)  BP: 124/83 (03 Sep 2024 04:38) (103/72 - 128/87)  RR: 18 (03 Sep 2024 04:38) (18 - 18)  SpO2: 95% (03 Sep 2024 04:38) (95% - 97%)    PE: NAD, alert awake  Left LE: Dressings C/D/I, no bleeding or drainage noted  Lower leg with ace wrap applied  EHL/TA/GS/FHL intact,   DP pulse 1+, Gross SILT s/s/DP/SP/tib distrib  Calf soft, NT B/L      A/P: s/p left distal femur ORIF POD#5  Pain control  DVT propx  PT  - NWB left LE  Left lower leg dressing management as per vascular - appreciate vasc consult -Can f/u with established vascular surgeon and wound care as outpatient   Dispo: rehab today pending med clearance      
Pt Name: DIMAS HARDY    MRN: [id number]    Patient is a being followed for lefft distal femur ORIF POD#1. Patient reports pain is well controlled. Denies motor sensory changes, CP/SOB/N/V/fever/chills. No complaints at this time.     PAST MEDICAL & SURGICAL HISTORY:  PAST MEDICAL & SURGICAL HISTORY:  HTN (hypertension)    BPH without urinary obstruction    Paroxysmal atrial fibrillation    Hypercholesteremia    S/P knee replacement  bilateral    Acute leg pain, left  s/p femur surgery - gisela    S/P inguinal hernia repair  Right    Femur fracture, left  repair    Allergies: adhesives (Unknown)  sulfa drugs (Unknown)  latex (Rash)      Medications: acetaminophen     Tablet .. 975 milliGRAM(s) Oral every 8 hours  apixaban 2.5 milliGRAM(s) Oral two times a day  atorvastatin 20 milliGRAM(s) Oral at bedtime  ketorolac   Injectable 15 milliGRAM(s) IV Push every 6 hours PRN  magnesium hydroxide Suspension 30 milliLiter(s) Oral daily PRN  metoprolol tartrate 25 milliGRAM(s) Oral two times a day  ondansetron Injectable 4 milliGRAM(s) IV Push every 6 hours PRN  oxyCODONE    IR 10 milliGRAM(s) Oral every 4 hours PRN  oxyCODONE    IR 5 milliGRAM(s) Oral every 4 hours PRN  sodium chloride 0.9%. 1000 milliLiter(s) IV Continuous <Continuous>  tamsulosin 0.4 milliGRAM(s) Oral at bedtime                        11.3   14.95 )-----------( 168      ( 30 Aug 2024 04:39 )             36.5     08-30    140  |  103  |  25.0<H>  ----------------------------<  142<H>  4.8   |  21.0<L>  |  0.80    Ca    8.0<L>      30 Aug 2024 04:39  Mg     1.7     08-29    TPro  6.3<L>  /  Alb  3.4  /  TBili  1.6  /  DBili  x   /  AST  22  /  ALT  11  /  AlkPhos  81  08-29      PHYSICAL EXAM:    Vital Signs Last 24 Hrs  T(C): 36.4 (30 Aug 2024 09:55), Max: 36.7 (29 Aug 2024 23:39)  T(F): 97.6 (30 Aug 2024 09:55), Max: 98 (29 Aug 2024 23:39)  HR: 86 (30 Aug 2024 09:55) (79 - 103)  BP: 101/67 (30 Aug 2024 09:55) (90/77 - 120/88)  BP(mean): --  RR: 18 (30 Aug 2024 09:55) (14 - 21)  SpO2: 94% (30 Aug 2024 09:55) (92% - 100%)    Parameters below as of 30 Aug 2024 09:55  Patient On (Oxygen Delivery Method): room air      Daily     Daily     Appearance: Alert, responsive, in no acute distress.  LLE: LLE mepilex with small dime sized staining proximally, otherwise dressing c/d/i, compartments soft and compressible, +PF/DF/EHL/FHL, SILT with subjective parasthesias at baseline, DP intact, BCR, no cyanosis    A/P:  Pt is a  69y Male with left distal femur ORIF    PLAN:   * DVTP - eliquis  * NWB LLE  * PT/OT  * Pain Control
Pt Name: DIMAS HRADY    MRN: [id number]      Patient is a being followed for  Left distal femur periprosthetic fracture/ ORIF left distal femur, POD#6.   Sitting comfortably in chair. No new orthopedic complaints a this time.        PAST MEDICAL & SURGICAL HISTORY:  PAST MEDICAL & SURGICAL HISTORY:  HTN (hypertension)      BPH without urinary obstruction      Paroxysmal atrial fibrillation      Hypercholesteremia      S/P knee replacement  bilateral      Acute leg pain, left  s/p femur surgery - gisela      S/P inguinal hernia repair  Right      Femur fracture, left  repair          Allergies: adhesives (Unknown)  sulfa drugs (Unknown)  latex (Rash)      Medications: acetaminophen     Tablet .. 975 milliGRAM(s) Oral every 8 hours  apixaban 5 milliGRAM(s) Oral two times a day  atorvastatin 20 milliGRAM(s) Oral at bedtime  magnesium hydroxide Suspension 30 milliLiter(s) Oral daily PRN  metoprolol tartrate 25 milliGRAM(s) Oral two times a day  ondansetron Injectable 4 milliGRAM(s) IV Push every 6 hours PRN  oxyCODONE    IR 10 milliGRAM(s) Oral every 4 hours PRN  oxyCODONE    IR 5 milliGRAM(s) Oral every 4 hours PRN  sodium chloride 0.9%. 1000 milliLiter(s) IV Continuous <Continuous>  tamsulosin 0.4 milliGRAM(s) Oral at bedtime        Ambulation: NWB LLE  PHYSICAL EXAM:    Vital Signs Last 24 Hrs  T(C): 36.6 (04 Sep 2024 09:13), Max: 36.7 (03 Sep 2024 10:49)  T(F): 97.8 (04 Sep 2024 09:13), Max: 98 (03 Sep 2024 10:49)  HR: 85 (04 Sep 2024 09:13) (85 - 93)  BP: 117/86 (04 Sep 2024 09:13) (117/86 - 126/80)  BP(mean): --  RR: 18 (04 Sep 2024 09:13) (18 - 18)  SpO2: 95% (04 Sep 2024 09:13) (94% - 95%)    Parameters below as of 04 Sep 2024 09:13  Patient On (Oxygen Delivery Method): room air      Daily     Daily Weight in k.7 (04 Sep 2024 00:18)    Appearance: Alert, responsive, in no acute distress.    Neurological: Sensation is grossly intact to light touch. 5/5 motor function of all extremities. No focal deficits or weaknesses found.    Skin: no rash on visible skin. Skin is clean, dry and intact. No bleeding. No abrasions. No ulcerations.    Vascular: 2+ distal pulses. Cap refill < 2 sec. No signs of venous   insufficiency   or stasis. No extremity ulcerations. No cyanosis.    Musculoskeletal:         Left Lower Extremity: Dressings remain clean dry and intact. Small spot  of dried bloody drainage .   Compartments soft non tender  Motor and sensory remain intact.   EHL/FHL intact.              A/P:  Pt is a  69y MalePOD#6    PLAN:   *NWB LLE  pain control  discharge to [facility] 
Vascular Surgery Follow up    Chart/Labs/Vitals reviewed  History of lower ext statis ulcer, left le  being followed at wound care center and with Dr. Collazo   Patient is now s/p surgical intervention for complex left periprosthetic femur fracture  Left Le reexamined  anterior left shin, superficial and clean    ~ 2x3 cm, granulating  + DP  Right le with chronic stasis changes and evidence of prior wounds to the medial malleolar region      --- Recommend to continue compressive dressings to the lower extremity   - Avoid adhesives to the tibial regions  - Apply adaptic, 4x4, Kerlix, ACE, change every 24-48 hours  - Patient encourage to follow up with Dr. Collazo post rehabilitation for resumption of [facility] boot therapy  - If patient wishes, he may follow up in our office, with Dr REGGIE Del Castillo 
DIMAS HARDY    [id number]    69y      Male    Patient is a 69y old  Male who presents with a chief complaint of Left distal femur periprosthetic fx (30 Aug 2024 09:05)      INTERVAL HPI/OVERNIGHT EVENTS:    Patient is doing ok, denies fever, chills, chest pain, sob.     Vital Signs Last 24 Hrs  T(C): 36.4 (30 Aug 2024 09:55), Max: 36.7 (29 Aug 2024 23:39)  T(F): 97.6 (30 Aug 2024 09:55), Max: 98 (29 Aug 2024 23:39)  HR: 86 (30 Aug 2024 09:55) (79 - 103)  BP: 101/67 (30 Aug 2024 09:55) (90/77 - 120/88)  RR: 18 (30 Aug 2024 09:55) (14 - 21)  SpO2: 94% (30 Aug 2024 09:55) (92% - 100%)    Parameters below as of 30 Aug 2024 09:55  Patient On (Oxygen Delivery Method): room air        PHYSICAL EXAM:    GENERAL: Elderly male looking comfortable   HEENT: PERRL, +EOMI  NECK: soft, Supple, No JVD  CHEST/LUNG: Clear to auscultate bilaterally; No wheezing  HEART: S1S2+, Regular rate and rhythm; No murmurs  ABDOMEN: Soft, Nontender, Nondistended; Bowel sounds present  EXTREMITIES:  1+ Peripheral Pulses, No edema, left thigh with clean dressings on, valgus deformities of his feet   SKIN: chronic left leg wound with dressings on   NEURO: AAOX3  PSYCH: normal mood    LABS:                        11.3   14.95 )-----------( 168      ( 30 Aug 2024 04:39 )             36.5     08-30    140  |  103  |  25.0<H>  ----------------------------<  142<H>  4.8   |  21.0<L>  |  0.80    Ca    8.0<L>      30 Aug 2024 04:39  Mg     1.7     08-29    TPro  6.3<L>  /  Alb  3.4  /  TBili  1.6  /  DBili  x   /  AST  22  /  ALT  11  /  AlkPhos  81  08-29        I&O's Summary    29 Aug 2024 07:01  -  30 Aug 2024 07:00  --------------------------------------------------------  IN: 1235 mL / OUT: 850 mL / NET: 385 mL    30 Aug 2024 07:01  -  30 Aug 2024 10:45  --------------------------------------------------------  IN: 0 mL / OUT: 150 mL / NET: -150 mL        MEDICATIONS  (STANDING):  acetaminophen     Tablet .. 975 milliGRAM(s) Oral every 8 hours  apixaban 2.5 milliGRAM(s) Oral two times a day  atorvastatin 20 milliGRAM(s) Oral at bedtime  metoprolol tartrate 25 milliGRAM(s) Oral two times a day  sodium chloride 0.9%. 1000 milliLiter(s) (85 mL/Hr) IV Continuous <Continuous>  tamsulosin 0.4 milliGRAM(s) Oral at bedtime    MEDICATIONS  (PRN):  ketorolac   Injectable 15 milliGRAM(s) IV Push every 6 hours PRN Moderate Pain (4 - 6)  magnesium hydroxide Suspension 30 milliLiter(s) Oral daily PRN Constipation  ondansetron Injectable 4 milliGRAM(s) IV Push every 6 hours PRN Nausea and/or Vomiting  oxyCODONE    IR 10 milliGRAM(s) Oral every 4 hours PRN Moderate Pain (4 - 6)  oxyCODONE    IR 5 milliGRAM(s) Oral every 4 hours PRN Mild Pain (1 - 3)        
DIMAS HARDY    [id number]    History:  The patient is status post ORIF left distal femur , POD#4.   Patient is doing well. The patient's pain is controlled using the prescribed pain medications. [facility]  Denies nausea, vomiting, chest pain, shortness of breath, abdominal pain or fever. No new orthopedic  complaints. No acute motor or sensory changes are reported.    Vital Signs Last 24 Hrs  T(C): 36.7 (02 Sep 2024 05:30), Max: 37 (01 Sep 2024 10:41)  T(F): 98.1 (02 Sep 2024 05:30), Max: 98.6 (01 Sep 2024 10:41)  HR: 90 (02 Sep 2024 05:30) (84 - 97)  BP: 127/89 (02 Sep 2024 05:30) (101/68 - 127/89)  BP(mean): --  RR: 18 (02 Sep 2024 05:30) (16 - 18)  SpO2: 97% (02 Sep 2024 05:30) (97% - 98%)    Parameters below as of 02 Sep 2024 05:30  Patient On (Oxygen Delivery Method): room air      I&O's Summary    01 Sep 2024 07:01  -  02 Sep 2024 07:00  --------------------------------------------------------  IN: 222 mL / OUT: 450 mL / NET: -228 mL          09-01    143  |  110<H>  |  21.5<H>  ----------------------------<  113<H>  3.9   |  24.0  |  0.71    Ca    8.1<L>      01 Sep 2024 04:57        MEDICATIONS  (STANDING):  acetaminophen     Tablet .. 975 milliGRAM(s) Oral every 8 hours  apixaban 5 milliGRAM(s) Oral two times a day  atorvastatin 20 milliGRAM(s) Oral at bedtime  metoprolol tartrate 25 milliGRAM(s) Oral two times a day  sodium chloride 0.9%. 1000 milliLiter(s) (85 mL/Hr) IV Continuous <Continuous>  tamsulosin 0.4 milliGRAM(s) Oral at bedtime    MEDICATIONS  (PRN):  bisacodyl Suppository 10 milliGRAM(s) Rectal daily PRN If no bowel movement  ketorolac   Injectable 15 milliGRAM(s) IV Push every 6 hours PRN Moderate Pain (4 - 6)  magnesium hydroxide Suspension 30 milliLiter(s) Oral daily PRN Constipation  ondansetron Injectable 4 milliGRAM(s) IV Push every 6 hours PRN Nausea and/or Vomiting  oxyCODONE    IR 10 milliGRAM(s) Oral every 4 hours PRN Moderate Pain (4 - 6)  oxyCODONE    IR 5 milliGRAM(s) Oral every 4 hours PRN Mild Pain (1 - 3)      Physical exam:  The Left hip dressings remain clean, dry and intact. No wound erythema, discharge, drainage is noted. Sensation to light touch is grossly intact without focal deficit and is symmetric bilaterally. No calf tenderness. Sensation to light touch is grossly intact distally. Motor function distally is 5/5. No foot drop.Capillary refill is less than 2 seconds. No cyanosis.    Primary Orthopedic Assessment:  •   Secondary  Orthopedic Assessment(s):   •   Secondary  Medical Assessment(s):   •   Plan:   • DVT prophylaxis as prescribed, including use of compression devices and ankle pumps  • Continue physical therapy  • Non-weight bearing of the Left lower extremity  • Pain control as clinically indicated  • Incentive spirometry encouraged  • Discharge planning – anticipated discharge is subacute rehabilitation 
Orthopaedic Trauma Surgeon Addendum:    I have personally performed a face-to-face diagnostic evaluation on this patient.  I have reviewed the physician assistant note and agree with the history, exam, and plan of care, except as noted.    Orthopedic Surgery is ready to proceed with surgery pending medical optimization and adequate Operating Room availability. Risks of surgical delay discussed with other providers and staff. Please call with any questions or concerns.     Amari Campbell MD  Orthopaedic Trauma Surgeon  [facility] [place] 
Patient seen at bedside after being called by nurse for concerns of KI placement.   Leg redressed - xerform put over distal wounds and 4x4 and ace wrap   KI reapplied and adjusted   Patient tolerated procedure well 
Pt Name: DIMAS HARDY    MRN: [id number]    Patient is a being followed for left distal femur ORIF POD#2. Patient reports pain is well controlled. Denies motor sensory changes, CP/SOB/N/V/fever/chills. No orthopedics complaints at this time.                                    11.3   14.95 )-----------( 168      ( 30 Aug 2024 04:39 )             36.5               ICU Vital Signs Last 24 Hrs  T(C): 36.7 (31 Aug 2024 04:05), Max: 36.7 (31 Aug 2024 04:05)  T(F): 98 (31 Aug 2024 04:05), Max: 98 (31 Aug 2024 04:05)  HR: 92 (31 Aug 2024 04:05) (86 - 94)  BP: 109/75 (31 Aug 2024 04:05) (103/60 - 133/89)  BP(mean): --  ABP: --  ABP(mean): --  RR: 18 (31 Aug 2024 04:05) (18 - 18)  SpO2: 96% (31 Aug 2024 04:05) (92% - 97%)    O2 Parameters below as of 31 Aug 2024 04:05  Patient On (Oxygen Delivery Method): room air    PHYSICAL EXAM:    Appearance: Alert, responsive, in no acute distress.  LLE: proximal dressing with 2 quater sized spots of serosang staining noted. distal dressing saturated with serosang staining. dressings taken down, prineo in place, scabbing noted to each incision, no acitve drainage or discharge. new gauze and tegaderm placed. compartments soft and compressible, +PF/DF/EHL/FHL, SILT with subjective parasthesias at baseline, DP intact, BCR, no cyanosis. ace wrap applied to extremity.     A/P:  Pt is a  69y Male with left distal femur ORIF    PLAN:   - DVTP - eliquis  - NWB LLE  - PT/OT  - Pain Control    
Pt Name: DIMAS HARDY    MRN: [id number]    Patient is a being followed for left distal femur ORIF POD#3. Patient reports pain is well controlled. Denies motor sensory changes, CP/SOB/N/V/fever/chills. No additional orthopedics complaints at this time.                     ICU Vital Signs Last 24 Hrs  T(C): 36.7 (01 Sep 2024 04:05), Max: 37 (31 Aug 2024 10:50)  T(F): 98 (01 Sep 2024 04:05), Max: 98.6 (31 Aug 2024 10:50)  HR: 92 (01 Sep 2024 04:05) (90 - 92)  BP: 125/79 (01 Sep 2024 04:05) (113/66 - 127/85)  BP(mean): --  ABP: --  ABP(mean): --  RR: 18 (01 Sep 2024 04:05) (17 - 18)  SpO2: 92% (01 Sep 2024 04:05) (92% - 98%)    O2 Parameters below as of 01 Sep 2024 04:05  Patient On (Oxygen Delivery Method): room air    09-01    143  |  110<H>  |  21.5<H>  ----------------------------<  113<H>  3.9   |  24.0  |  0.71    Ca    8.1<L>      01 Sep 2024 04:57  Phos  2.5     08-31  Mg     1.9     08-31      PHYSICAL EXAM:    Appearance: Alert, responsive, in no acute distress.  LLE: proximal and middle dressings: c/d/i-no drainage or discharge noted.   distal ace wrap taken down, 2 wounds noted to distal extremity-no drainage or discharge-being managed by vascular team-gauze and ace wrap applied. compartments soft and compressible, +PF/DF/EHL/FHL, SILT with subjective parasthesias at baseline, DP intact, BCR, no cyanosis. ace wrap applied to extremity.     A/P:  Pt is a  69y Male with left distal femur ORIF    PLAN:   - DVTP - eliquis  - NWB LLE  - PT/OT  - Pain Control  - medical management   
DIMAS HARDY    [id number]    69y      Male    Patient is a 69y old  Male who presents with a chief complaint of Left distal femur periprosthetic fx (29 Aug 2024 07:02)      INTERVAL HPI/OVERNIGHT EVENTS:      patient is s/p procedure, doing ok, his pain is well controlled, denies fever, chills, chest pain, sob     Vital Signs Last 24 Hrs  T(C): 37 (29 Aug 2024 06:38), Max: 37 (29 Aug 2024 06:38)  T(F): 98.6 (29 Aug 2024 06:38), Max: 98.6 (29 Aug 2024 06:38)  HR: 102 (29 Aug 2024 06:38) (78 - 138)  BP: 117/80 (29 Aug 2024 06:38) (104/73 - 144/97)  RR: 16 (29 Aug 2024 06:38) (16 - 18)  SpO2: 99% (29 Aug 2024 06:38) (90% - 99%)    Parameters below as of 29 Aug 2024 05:45  Patient On (Oxygen Delivery Method): room air        PHYSICAL EXAM:  GENERAL: Elderly male looking comfortable   HEENT: PERRL, +EOMI  NECK: soft, Supple, No JVD  CHEST/LUNG: Clear to auscultate bilaterally; No wheezing  HEART: S1S2+, Regular rate and rhythm; No murmurs  ABDOMEN: Soft, Nontender, Nondistended; Bowel sounds present  EXTREMITIES:  1+ Peripheral Pulses, No edema, left thigh with clean dressings on, valgus deformities of his feet   SKIN: chronic left leg wound with dressings on   NEURO: AAOX3  PSYCH: normal mood      LABS:                        13.6   9.95  )-----------( 152      ( 29 Aug 2024 04:42 )             42.0     08-29    139  |  102  |  26.2<H>  ----------------------------<  124<H>  4.6   |  24.0  |  0.97    Ca    8.4      29 Aug 2024 04:42  Mg     1.7     08-29    TPro  6.3<L>  /  Alb  3.4  /  TBili  1.6  /  DBili  x   /  AST  22  /  ALT  11  /  AlkPhos  81  08-29    PT/INR - ( 27 Aug 2024 22:30 )   PT: 14.7 sec;   INR: 1.34 ratio         PTT - ( 27 Aug 2024 22:30 )  PTT:32.0 sec          I&O's Summary    28 Aug 2024 07:01  -  29 Aug 2024 07:00  --------------------------------------------------------  IN: 300 mL / OUT: 550 mL / NET: -250 mL        MEDICATIONS  (STANDING):  lactated ringers Bolus 1000 milliLiter(s) IV Bolus once  lactated ringers. 1000 milliLiter(s) (75 mL/Hr) IV Continuous <Continuous>  magnesium sulfate  IVPB 2 Gram(s) IV Intermittent once    MEDICATIONS  (PRN):        
                [facility]                                   [address] [place] [address]                                                      PHONE: [phone]                                                         FAX: [phone]  [email or web address]  ---------------------------------------------------------------------------------------------------------------------------------    Overnight events/patient complaints: suraj AF with CVR, oozing from surgical wound      adhesives (Unknown)  sulfa drugs (Unknown)  latex (Rash)    MEDICATIONS  (STANDING):  acetaminophen     Tablet .. 975 milliGRAM(s) Oral every 8 hours  apixaban 2.5 milliGRAM(s) Oral two times a day  atorvastatin 20 milliGRAM(s) Oral at bedtime  metoprolol tartrate 25 milliGRAM(s) Oral two times a day  sodium chloride 0.9%. 1000 milliLiter(s) (85 mL/Hr) IV Continuous <Continuous>  tamsulosin 0.4 milliGRAM(s) Oral at bedtime    MEDICATIONS  (PRN):  ketorolac   Injectable 15 milliGRAM(s) IV Push every 6 hours PRN Moderate Pain (4 - 6)  magnesium hydroxide Suspension 30 milliLiter(s) Oral daily PRN Constipation  ondansetron Injectable 4 milliGRAM(s) IV Push every 6 hours PRN Nausea and/or Vomiting  oxyCODONE    IR 10 milliGRAM(s) Oral every 4 hours PRN Moderate Pain (4 - 6)  oxyCODONE    IR 5 milliGRAM(s) Oral every 4 hours PRN Mild Pain (1 - 3)      Vital Signs Last 24 Hrs  T(C): 36.4 (30 Aug 2024 09:55), Max: 36.7 (29 Aug 2024 23:39)  T(F): 97.6 (30 Aug 2024 09:55), Max: 98 (29 Aug 2024 23:39)  HR: 86 (30 Aug 2024 09:55) (79 - 103)  BP: 101/67 (30 Aug 2024 09:55) (90/77 - 120/88)  BP(mean): --  RR: 18 (30 Aug 2024 09:55) (14 - 21)  SpO2: 94% (30 Aug 2024 09:55) (92% - 100%)    Parameters below as of 30 Aug 2024 09:55  Patient On (Oxygen Delivery Method): room air      Daily     Daily   ICU Vital Signs Last 24 Hrs  DIMAS HARDY  I&O's Detail    29 Aug 2024 07:01  -  30 Aug 2024 07:00  --------------------------------------------------------  IN:    Oral Fluid: 300 mL    sodium chloride 0.9%: 935 mL  Total IN: 1235 mL    OUT:    Voided (mL): 850 mL  Total OUT: 850 mL    Total NET: 385 mL      30 Aug 2024 07:01  -  30 Aug 2024 10:45  --------------------------------------------------------  IN:  Total IN: 0 mL    OUT:    Voided (mL): 150 mL  Total OUT: 150 mL    Total NET: -150 mL        I&O's Summary    29 Aug 2024 07:01  -  30 Aug 2024 07:00  --------------------------------------------------------  IN: 1235 mL / OUT: 850 mL / NET: 385 mL    30 Aug 2024 07:01  -  30 Aug 2024 10:45  --------------------------------------------------------  IN: 0 mL / OUT: 150 mL / NET: -150 mL      Drug Dosing Weight  DIMAS HARDY      PHYSICAL EXAM:  General: Appears alert and cooperative.  HEENT: Head; normocephalic, atraumatic.  Eyes: Pupils reactive, cornea wnl.  Neck: Supple, no nodes adenopathy, no NVD or carotid bruit or thyromegaly.  CARDIOVASCULAR: Normal S1 and S2, No murmur, rub, gallop or lift.   LUNGS: No rales, rhonchi or wheeze. Normal breath sounds bilaterally.  ABDOMEN: Soft, nontender without mass or organomegaly. bowel sounds normoactive.  EXTREMITIES: No clubbing, cyanosis or edema. Distal pulses wnl.   SKIN: warm and dry with normal turgor.  NEURO: Alert/oriented x 3/normal motor exam. No pathologic reflexes.    PSYCH: normal affect.        LABS:                        11.3   14.95 )-----------( 168      ( 30 Aug 2024 04:39 )             36.5     08-30    140  |  103  |  25.0<H>  ----------------------------<  142<H>  4.8   |  21.0<L>  |  0.80    Ca    8.0<L>      30 Aug 2024 04:39  Mg     1.7     08-29    TPro  6.3<L>  /  Alb  3.4  /  TBili  1.6  /  DBili  x   /  AST  22  /  ALT  11  /  AlkPhos  81  08-29    DIMAS HARDY        Urinalysis Basic - ( 30 Aug 2024 04:39 )    Color: x / Appearance: x / SG: x / pH: x  Gluc: 142 mg/dL / Ketone: x  / Bili: x / Urobili: x   Blood: x / Protein: x / Nitrite: x   Leuk Esterase: x / RBC: x / WBC x   Sq Epi: x / Non Sq Epi: x / Bacteria: x        RADIOLOGY & ADDITIONAL STUDIES:    INTERPRETATION OF TELEMETRY (personally reviewed):    ECG:< from: 12 Lead ECG (08.27.24 @ 23:57) >  Diagnosis Line Atrial fibrillation with rapid ventricular response  Rightbundle branch block  Nonspecific ST and T wave abnormality Anteroseptal leads  Abnormal ECG    Confirmed by Kelly BLOUNT, Damien ([id number]) on 8/28/2024 2:07:36 PM    < end of copied text >              
CC: Left distal femur periprosthetic fx       INTERVAL HPI/OVERNIGHT EVENTS: Sitting in chair, c/o diarrhea. States was given stool softener but is not taking opiates. Wants an immodium. Pain controlled with extra strength Tylenol.     Vital Signs Last 24 Hrs  T(C): 36.4 (03 Sep 2024 04:38), Max: 37 (02 Sep 2024 10:58)  T(F): 97.5 (03 Sep 2024 04:38), Max: 98.6 (02 Sep 2024 10:58)  HR: 89 (03 Sep 2024 04:38) (82 - 95)  BP: 124/83 (03 Sep 2024 04:38) (103/72 - 128/87)  BP(mean): --  RR: 18 (03 Sep 2024 04:38) (18 - 18)  SpO2: 95% (03 Sep 2024 04:38) (95% - 97%)    Parameters below as of 03 Sep 2024 04:38  Patient On (Oxygen Delivery Method): room air    ROS:  Constitutional, Eyes, ENT, Cardiovascular, Respiratory, Gastrointestinal, Genitourinary, Musculoskeletal, Integumentary, Neurological, Psychiatric, Endocrine, Heme/Lymph, and Allergic/Immunologic review of systems are otherwise negative except as noted in the HPI    PHYSICAL EXAM:    GENERAL: Awake, alert, NAD  HEENT: PERRL, +EOMI  NECK: soft, Supple, No JVD  CHEST/LUNG: Clear to auscultate bilaterally; No wheezing  HEART: S1S2+, Regular rate and rhythm; No murmurs  ABDOMEN: Soft, Nontender, Nondistended; Bowel sounds present  EXTREMITIES:  1+ Peripheral Pulses, No edema,Left LE dressings C/D/I, no bleeding or drainage noted. Lower leg with ace wrap in place.   NEURO: No focal deficits  PSYCH: Calm and cooperative    I&O's Detail    02 Sep 2024 07:01  -  03 Sep 2024 07:00  --------------------------------------------------------  IN:    Oral Fluid: 480 mL  Total IN: 480 mL    OUT:  Total OUT: 0 mL    Total NET: 480 mL                        CAPILLARY BLOOD GLUCOSE              MEDICATIONS  (STANDING):  acetaminophen     Tablet .. 975 milliGRAM(s) Oral every 8 hours  apixaban 5 milliGRAM(s) Oral two times a day  atorvastatin 20 milliGRAM(s) Oral at bedtime  metoprolol tartrate 25 milliGRAM(s) Oral two times a day  sodium chloride 0.9%. 1000 milliLiter(s) (85 mL/Hr) IV Continuous <Continuous>  tamsulosin 0.4 milliGRAM(s) Oral at bedtime    MEDICATIONS  (PRN):  bisacodyl Suppository 10 milliGRAM(s) Rectal daily PRN If no bowel movement  ketorolac   Injectable 15 milliGRAM(s) IV Push every 6 hours PRN Moderate Pain (4 - 6)  magnesium hydroxide Suspension 30 milliLiter(s) Oral daily PRN Constipation  ondansetron Injectable 4 milliGRAM(s) IV Push every 6 hours PRN Nausea and/or Vomiting  oxyCODONE    IR 10 milliGRAM(s) Oral every 4 hours PRN Moderate Pain (4 - 6)  oxyCODONE    IR 5 milliGRAM(s) Oral every 4 hours PRN Mild Pain (1 - 3)      RADIOLOGY & ADDITIONAL TESTS:

## 2024-09-11 ENCOUNTER — APPOINTMENT (OUTPATIENT)
Dept: ORTHOPEDIC SURGERY | Facility: CLINIC | Age: 69
End: 2024-09-11
Payer: MEDICARE

## 2024-09-11 DIAGNOSIS — M97.8XXA PERIPROSTHETIC FRACTURE AROUND OTHER INTERNAL PROSTHETIC JOINT, INITIAL ENCOUNTER: ICD-10-CM

## 2024-09-11 DIAGNOSIS — Z98.890 OTHER SPECIFIED POSTPROCEDURAL STATES: ICD-10-CM

## 2024-09-11 DIAGNOSIS — Z87.81 OTHER SPECIFIED POSTPROCEDURAL STATES: ICD-10-CM

## 2024-09-11 DIAGNOSIS — Z96.649 PERIPROSTHETIC FRACTURE AROUND OTHER INTERNAL PROSTHETIC JOINT, INITIAL ENCOUNTER: ICD-10-CM

## 2024-09-11 PROCEDURE — 73552 X-RAY EXAM OF FEMUR 2/>: CPT | Mod: LT

## 2024-09-11 PROCEDURE — 99024 POSTOP FOLLOW-UP VISIT: CPT

## 2024-09-11 NOTE — HISTORY OF PRESENT ILLNESS
[___ Weeks Post Op] : [unfilled] weeks post op [Xray (Date:___)] : [unfilled] Xray -  [Doing Well] : is doing well [No Sign of Infection] : is showing no signs of infection [Adequate Pain Control] : has adequate pain control [de-identified] : POS: Open reduction and internal fixation of left periprosthetic femur fracture.  DOS: 08/29/2024 [de-identified] : 69-year-old male presents today approximately 2 weeks status post ORIF left periprosthetic femur fracture.  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  He also states that right knee had to be revised secondary to infection approximately 6-7 years ago.  Currently patient is at [place] rehab doing some physical therapy with light weightbearing on the left lower extremity.  Overall pain is well managed.  No fevers or chills.  No discharge from incisions.  No calf tenderness.  He is on Eliquis for DVT prophylaxis.  [de-identified] : The surgical incision site(s) was noted to have a discharge (scant bloody drainage upon removal of dressing ) and swollen, but clean, dry and intact, not erythematous and not dehisced. [de-identified] : Xray of the left femur obtained and independently reviewed in the office today, 09/11/2024, demonstrated acceptable periprosthetic femur fracture alignment. Hardware is in appropriate position without any evidence of failure or loosening when compared to immediate postoperative films. [de-identified] : 69-year-old male 2-week status post periprosthetic femur fracture ORIF on the left side [de-identified] : As time the patient is to continue with toe-touch weightbearing on the left lower extremity for an additional 4 to 5 weeks.  At that time he will follow-up with repeat x-rays and transitioning weightbearing status.  In the interim continue with medications as indicated. All questions and concerns were addressed with the patient and they are in agreement with this plan.

## 2024-09-11 NOTE — END OF VISIT
[FreeTextEntry3] : Orthopaedic Trauma Surgeon Addendum:  I have personally performed a face-to-face diagnostic evaluation on this patient.  I have reviewed the physician assistant note and agree with the history, exam, and plan of care, except as noted.  Amari Campbell MD Orthopaedic Trauma Surgeon [facility] [place]

## 2024-10-09 ENCOUNTER — APPOINTMENT (OUTPATIENT)
Dept: ORTHOPEDIC SURGERY | Facility: CLINIC | Age: 69
End: 2024-10-09

## 2024-10-09 DIAGNOSIS — Z87.81 OTHER SPECIFIED POSTPROCEDURAL STATES: ICD-10-CM

## 2024-10-09 DIAGNOSIS — Z96.649 PERIPROSTHETIC FRACTURE AROUND OTHER INTERNAL PROSTHETIC JOINT, INITIAL ENCOUNTER: ICD-10-CM

## 2024-10-09 DIAGNOSIS — M97.8XXA PERIPROSTHETIC FRACTURE AROUND OTHER INTERNAL PROSTHETIC JOINT, INITIAL ENCOUNTER: ICD-10-CM

## 2024-10-09 DIAGNOSIS — Z98.890 OTHER SPECIFIED POSTPROCEDURAL STATES: ICD-10-CM

## 2024-10-09 PROCEDURE — 99024 POSTOP FOLLOW-UP VISIT: CPT

## 2024-10-09 PROCEDURE — 73552 X-RAY EXAM OF FEMUR 2/>: CPT | Mod: LT

## 2024-10-15 PROCEDURE — 83735 ASSAY OF MAGNESIUM: CPT

## 2024-10-15 PROCEDURE — 87641 MR-STAPH DNA AMP PROBE: CPT

## 2024-10-15 PROCEDURE — 73562 X-RAY EXAM OF KNEE 3: CPT

## 2024-10-15 PROCEDURE — C1713: CPT

## 2024-10-15 PROCEDURE — 73590 X-RAY EXAM OF LOWER LEG: CPT

## 2024-10-15 PROCEDURE — 84100 ASSAY OF PHOSPHORUS: CPT

## 2024-10-15 PROCEDURE — 71045 X-RAY EXAM CHEST 1 VIEW: CPT

## 2024-10-15 PROCEDURE — 76000 FLUOROSCOPY <1 HR PHYS/QHP: CPT

## 2024-10-15 PROCEDURE — 97110 THERAPEUTIC EXERCISES: CPT

## 2024-10-15 PROCEDURE — 36415 COLL VENOUS BLD VENIPUNCTURE: CPT

## 2024-10-15 PROCEDURE — 87640 STAPH A DNA AMP PROBE: CPT

## 2024-10-15 PROCEDURE — 86901 BLOOD TYPING SEROLOGIC RH(D): CPT

## 2024-10-15 PROCEDURE — C9399: CPT

## 2024-10-15 PROCEDURE — 97116 GAIT TRAINING THERAPY: CPT

## 2024-10-15 PROCEDURE — 85025 COMPLETE CBC W/AUTO DIFF WBC: CPT

## 2024-10-15 PROCEDURE — 86900 BLOOD TYPING SEROLOGIC ABO: CPT

## 2024-10-15 PROCEDURE — 85610 PROTHROMBIN TIME: CPT

## 2024-10-15 PROCEDURE — 96374 THER/PROPH/DIAG INJ IV PUSH: CPT

## 2024-10-15 PROCEDURE — 99285 EMERGENCY DEPT VISIT HI MDM: CPT | Mod: 25

## 2024-10-15 PROCEDURE — 73700 CT LOWER EXTREMITY W/O DYE: CPT | Mod: MC

## 2024-10-15 PROCEDURE — 85730 THROMBOPLASTIN TIME PARTIAL: CPT

## 2024-10-15 PROCEDURE — 73552 X-RAY EXAM OF FEMUR 2/>: CPT

## 2024-10-15 PROCEDURE — 85027 COMPLETE CBC AUTOMATED: CPT

## 2024-10-15 PROCEDURE — 73564 X-RAY EXAM KNEE 4 OR MORE: CPT

## 2024-10-15 PROCEDURE — 80048 BASIC METABOLIC PNL TOTAL CA: CPT

## 2024-10-15 PROCEDURE — 80053 COMPREHEN METABOLIC PANEL: CPT

## 2024-10-15 PROCEDURE — 93005 ELECTROCARDIOGRAM TRACING: CPT

## 2024-10-15 PROCEDURE — 86850 RBC ANTIBODY SCREEN: CPT

## 2024-10-22 ENCOUNTER — EMERGENCY (EMERGENCY)
Facility: HOSPITAL | Age: 69
LOS: 1 days | Discharge: HOME | End: 2024-10-22
Attending: STUDENT IN AN ORGANIZED HEALTH CARE EDUCATION/TRAINING PROGRAM
Payer: MEDICARE

## 2024-10-22 VITALS
WEIGHT: 205.03 LBS | RESPIRATION RATE: 18 BRPM | SYSTOLIC BLOOD PRESSURE: 123 MMHG | TEMPERATURE: 98 F | OXYGEN SATURATION: 98 % | HEART RATE: 82 BPM | DIASTOLIC BLOOD PRESSURE: 86 MMHG | HEIGHT: 66 IN

## 2024-10-22 DIAGNOSIS — M79.605 PAIN IN LEFT LEG: Chronic | ICD-10-CM

## 2024-10-22 DIAGNOSIS — S72.92XA UNSPECIFIED FRACTURE OF LEFT FEMUR, INITIAL ENCOUNTER FOR CLOSED FRACTURE: Chronic | ICD-10-CM

## 2024-10-22 DIAGNOSIS — Z96.659 PRESENCE OF UNSPECIFIED ARTIFICIAL KNEE JOINT: Chronic | ICD-10-CM

## 2024-10-22 DIAGNOSIS — Z98.890 OTHER SPECIFIED POSTPROCEDURAL STATES: Chronic | ICD-10-CM

## 2024-10-22 PROCEDURE — 99284 EMERGENCY DEPT VISIT MOD MDM: CPT | Mod: 25

## 2024-10-22 PROCEDURE — 29125 APPL SHORT ARM SPLINT STATIC: CPT | Mod: LT

## 2024-10-22 PROCEDURE — 73110 X-RAY EXAM OF WRIST: CPT | Mod: 26,LT

## 2024-10-22 PROCEDURE — 73130 X-RAY EXAM OF HAND: CPT

## 2024-10-22 PROCEDURE — 73130 X-RAY EXAM OF HAND: CPT | Mod: 26,LT

## 2024-10-22 PROCEDURE — 73110 X-RAY EXAM OF WRIST: CPT

## 2024-10-22 NOTE — ED PROVIDER NOTE - PHYSICAL EXAMINATION
Gen: Well appearing in NAD  Head: NC/AT  Neck: trachea midline  Card: regular rate and rhythm  Resp:  CTAB  Abd: soft, non-distended, non-tender  Ext: left wrist swelling. neurovascularly intact. full range of motion all 4 extremities. no bruising or lacerations.   Neuro:  A&O, CN 2-12 intact, No tremors. No fasciculations. No nystagmus. Normal sensation. Normal strength.  Skin:  Warm and dry as visualized  Psych:  Normal affect and mood

## 2024-10-22 NOTE — ED PROVIDER NOTE - PROGRESS NOTE DETAILS
patient received at sign out pending results. xrays reviewed, concern for right scaphoid, trapezium fractures. possible triquetrum fracture as well. patient neurovascularly intact. patient placed in thumb spica splint. hand follow up provided. return precautions given. patient agreeable with plan. stable for discharge.

## 2024-10-22 NOTE — ED PROVIDER NOTE - PATIENT PORTAL LINK FT
You can access the FollowMyHealth Patient Portal offered by [facility] by registering at the following website: [email or web address]. By joining [facility]’s FollowMyHealth portal, you will also be able to view your health information using other applications (apps) compatible with our system.

## 2024-10-22 NOTE — ED PROVIDER NOTE - CARE PROVIDER_API CALL
Bill Grant  Orthopaedic Surgery  [address] [place] [address]  Phone: [phone]  Fax: [phone]  Follow Up Time:

## 2024-10-22 NOTE — ED ADULT TRIAGE NOTE - AVIAN FLU SYMPTOMS
9/30/20 LT TKR AUBREY    Patient said she feels like one of her stitches have maybe pulled out. She doesn't see a gap. Wants to know if you want to see her or should she just keep an eye on it. No

## 2024-10-22 NOTE — ED PROVIDER NOTE - OBJECTIVE STATEMENT
69 year old male presents from [place] with left wrist injury. Pt states he had a mechanical fall in the bathroom yesterday morning. States he was holding onto bar while he fell down and bent his left wrist back. Complaining of pain with wrist extension. Denies head strike, LOC, lightheadedness or any other injury or symptoms.

## 2024-10-22 NOTE — ED PROVIDER NOTE - CLINICAL SUMMARY MEDICAL DECISION MAKING FREE TEXT BOX
69 year old male presents from [place] with left wrist injury. Pt states he had a mechanical fall in the bathroom yesterday morning. States he was holding onto bar while he fell down and bent his left wrist back. Denies head strike, LOC or any other injury or symptoms. 69 year old male presents from [place] with left wrist injury. Pt states he had a mechanical fall in the bathroom yesterday morning. States he was holding onto bar while he fell down and bent his left wrist back. Denies head strike, LOC or any other injury or symptoms.    Patient received in signout from Dr. Juarez pending report from the radiologist regarding wrist x-ray which is concerning for acute fracture.  Discussed obtaining official report from radiology via our current system.  However after waiting several hours still no report available.  Patient is irate and would prefer to be discharged and follow-up outpatient.  This will be splinted and patient given instructions for follow-up.

## 2024-10-22 NOTE — ED ADULT TRIAGE NOTE - CHIEF COMPLAINT QUOTE
pt BIBA from home s/p single mechanical fall yesterday in [place]. no head strike or LOC. pt only c.o of L wrist pain and swelling. pulses intact  last taken Tylenol @ 6am.

## 2024-10-22 NOTE — ED ADULT NURSE NOTE - OBJECTIVE STATEMENT
Pt c/o L wrist swelling and pain. States he was in the bathroom, slipped while holding onto the railing and felt his wrist pull the wrong way. Denies head strike, LOC. On Eliquis.  Was in rehab for a femur f/x.

## 2024-10-22 NOTE — ED PROVIDER NOTE - WR INTERPRETATION 1
concern for trapezium fracture as well as possible scaphoid lunate and triquetrium fractures. minimal displacement, no dislocation.

## 2024-10-22 NOTE — ED PROVIDER NOTE - ATTENDING CONTRIBUTION TO CARE
69 year old male presents from [place] with left wrist injury. Pt states he had a mechanical fall in the bathroom yesterday morning. States he was holding onto bar while he fell down and bent his left wrist back. Denies head strike, LOC or any other injury or symptoms.    Impression: L wrist injury    I, Kvng Mishra, performed the initial face to face bedside interview with this patient regarding history of present illness, review of symptoms and relevant past medical, social and family history.  I completed an independent physical examination.  I was the initial provider who evaluated this patient. I have signed out the follow up of any pending tests (i.e. labs, radiological studies) to the resident.  I have communicated the patient’s plan of care and disposition with the resident

## 2024-10-23 VITALS
DIASTOLIC BLOOD PRESSURE: 95 MMHG | SYSTOLIC BLOOD PRESSURE: 142 MMHG | HEART RATE: 86 BPM | OXYGEN SATURATION: 98 % | RESPIRATION RATE: 18 BRPM | TEMPERATURE: 98 F

## 2024-10-23 NOTE — ED PROCEDURE NOTE - NS ED PERI NEURO NEG
Pre-application: Motor, sensory, and vascular responses intact in the injured extremity./Post-application: Motor, sensory, and vascular responses intact in the injured extremity. Stable

## 2024-11-06 ENCOUNTER — APPOINTMENT (OUTPATIENT)
Dept: ORTHOPEDIC SURGERY | Facility: CLINIC | Age: 69
End: 2024-11-06
Payer: MEDICARE

## 2024-11-06 DIAGNOSIS — M97.8XXA PERIPROSTHETIC FRACTURE AROUND OTHER INTERNAL PROSTHETIC JOINT, INITIAL ENCOUNTER: ICD-10-CM

## 2024-11-06 DIAGNOSIS — Z96.649 PERIPROSTHETIC FRACTURE AROUND OTHER INTERNAL PROSTHETIC JOINT, INITIAL ENCOUNTER: ICD-10-CM

## 2024-11-06 PROCEDURE — 73552 X-RAY EXAM OF FEMUR 2/>: CPT | Mod: LT

## 2024-11-06 PROCEDURE — 99024 POSTOP FOLLOW-UP VISIT: CPT

## 2025-01-08 ENCOUNTER — APPOINTMENT (OUTPATIENT)
Dept: ORTHOPEDIC SURGERY | Facility: CLINIC | Age: 70
End: 2025-01-08
Payer: MEDICARE

## 2025-01-08 VITALS — WEIGHT: 200 LBS | BODY MASS INDEX: 33.32 KG/M2 | HEIGHT: 65 IN

## 2025-01-08 DIAGNOSIS — M97.8XXA PERIPROSTHETIC FRACTURE AROUND OTHER INTERNAL PROSTHETIC JOINT, INITIAL ENCOUNTER: ICD-10-CM

## 2025-01-08 DIAGNOSIS — Z96.649 PERIPROSTHETIC FRACTURE AROUND OTHER INTERNAL PROSTHETIC JOINT, INITIAL ENCOUNTER: ICD-10-CM

## 2025-01-08 PROCEDURE — 73552 X-RAY EXAM OF FEMUR 2/>: CPT | Mod: LT

## 2025-01-08 PROCEDURE — 99214 OFFICE O/P EST MOD 30 MIN: CPT

## 2025-02-07 ENCOUNTER — EMERGENCY (EMERGENCY)
Facility: HOSPITAL | Age: 70
LOS: 1 days | Discharge: HOME | End: 2025-02-07
Attending: EMERGENCY MEDICINE
Payer: MEDICARE

## 2025-02-07 VITALS
OXYGEN SATURATION: 100 % | RESPIRATION RATE: 18 BRPM | DIASTOLIC BLOOD PRESSURE: 99 MMHG | HEART RATE: 75 BPM | SYSTOLIC BLOOD PRESSURE: 158 MMHG

## 2025-02-07 VITALS
SYSTOLIC BLOOD PRESSURE: 143 MMHG | TEMPERATURE: 99 F | OXYGEN SATURATION: 99 % | HEART RATE: 71 BPM | DIASTOLIC BLOOD PRESSURE: 78 MMHG | RESPIRATION RATE: 17 BRPM

## 2025-02-07 DIAGNOSIS — Z98.890 OTHER SPECIFIED POSTPROCEDURAL STATES: Chronic | ICD-10-CM

## 2025-02-07 DIAGNOSIS — S72.92XA UNSPECIFIED FRACTURE OF LEFT FEMUR, INITIAL ENCOUNTER FOR CLOSED FRACTURE: Chronic | ICD-10-CM

## 2025-02-07 DIAGNOSIS — Z96.659 PRESENCE OF UNSPECIFIED ARTIFICIAL KNEE JOINT: Chronic | ICD-10-CM

## 2025-02-07 DIAGNOSIS — M79.605 PAIN IN LEFT LEG: Chronic | ICD-10-CM

## 2025-02-07 PROCEDURE — 70450 CT HEAD/BRAIN W/O DYE: CPT | Mod: MC

## 2025-02-07 PROCEDURE — 90715 TDAP VACCINE 7 YRS/> IM: CPT

## 2025-02-07 PROCEDURE — 70450 CT HEAD/BRAIN W/O DYE: CPT | Mod: 26

## 2025-02-07 PROCEDURE — 12011 RPR F/E/E/N/L/M 2.5 CM/<: CPT

## 2025-02-07 PROCEDURE — 99284 EMERGENCY DEPT VISIT MOD MDM: CPT | Mod: 25

## 2025-02-07 PROCEDURE — 90471 IMMUNIZATION ADMIN: CPT

## 2025-02-07 PROCEDURE — 72125 CT NECK SPINE W/O DYE: CPT | Mod: 26

## 2025-02-07 PROCEDURE — 72125 CT NECK SPINE W/O DYE: CPT | Mod: MC

## 2025-02-07 RX ORDER — CLOSTRIDIUM TETANI TOXOID ANTIGEN (FORMALDEHYDE INACTIVATED), CORYNEBACTERIUM DIPHTHERIAE TOXOID ANTIGEN (FORMALDEHYDE INACTIVATED), BORDETELLA PERTUSSIS TOXOID ANTIGEN (GLUTARALDEHYDE INACTIVATED), BORDETELLA PERTUSSIS FILAMENTOUS HEMAGGLUTININ ANTIGEN (FORMALDEHYDE INACTIVATED), BORDETELLA PERTUSSIS PERTACTIN ANTIGEN, AND BORDETELLA PERTUSSIS FIMBRIAE 2/3 ANTIGEN 5; 2; 2.5; 5; 3; 5 [LF]/.5ML; [LF]/.5ML; UG/.5ML; UG/.5ML; UG/.5ML; UG/.5ML
0.5 INJECTION, SUSPENSION INTRAMUSCULAR ONCE
Refills: 0 | Status: COMPLETED | OUTPATIENT
Start: 2025-02-07 | End: 2025-02-07

## 2025-02-07 RX ADMIN — CLOSTRIDIUM TETANI TOXOID ANTIGEN (FORMALDEHYDE INACTIVATED), CORYNEBACTERIUM DIPHTHERIAE TOXOID ANTIGEN (FORMALDEHYDE INACTIVATED), BORDETELLA PERTUSSIS TOXOID ANTIGEN (GLUTARALDEHYDE INACTIVATED), BORDETELLA PERTUSSIS FILAMENTOUS HEMAGGLUTININ ANTIGEN (FORMALDEHYDE INACTIVATED), BORDETELLA PERTUSSIS PERTACTIN ANTIGEN, AND BORDETELLA PERTUSSIS FIMBRIAE 2/3 ANTIGEN 0.5 MILLILITER(S): 5; 2; 2.5; 5; 3; 5 INJECTION, SUSPENSION INTRAMUSCULAR at 14:50

## 2025-02-07 NOTE — ED PROVIDER NOTE - PHYSICAL EXAMINATION
VITAL SIGNS: I have reviewed nursing notes and confirm.  CONSTITUTIONAL:  in no acute distress.  SKIN: Skin exam is warm and dry, no acute rash.  HEAD: Normocephalic;   EYES: PERRL, EOM intact; conjunctiva and sclera clear.  ENT: No nasal discharge; airway clear. Throat clear. Lac to frontal forehead, lac to lt bridge of nose. Non bleeidng. Multiple abrasions to the face.   NECK: Supple; non tender.    CARD: Regular rate and rhythm.  RESP: No wheezes,  no rales or rhonchi.   ABD:  soft; non-distended; non-tender;   EXT: Normal ROM. No clubbing, cyanosis or edema.  NEURO: Alert, oriented. Grossly unremarkable. No focal deficits.  moves all extremities.   PSYCH: Cooperative, appropriate.

## 2025-02-07 NOTE — ED PROVIDER NOTE - PATIENT PORTAL LINK FT
You can access the FollowMyHealth Patient Portal offered by [facility] by registering at the following website: [email or web address]. By joining [facility]’s FollowMyHealth portal, you will also be able to view your health information using other applications (apps) compatible with our system.

## 2025-02-07 NOTE — ED PROVIDER NOTE - OBJECTIVE STATEMENT
69y Male hx htn, hld, afib on eliquis complaining of fall. Patient states he was reaching for an [employer] package today when he fell forward and struck his face. Denies any symptoms beforehand. Denies LOC. Denies chest pain ,sob, N/V/D, abdominal pain, dizziness, lightheadedness, vertigo. Patient has full ROM of ble. A&Ox3.

## 2025-02-07 NOTE — ED ADULT NURSE NOTE - OBJECTIVE STATEMENT
Aox4. Pt presenting to Emergency Department complaining of laceration to forehead s/p mechanical fall. Pt reports bending over PTA, in which pt fell forward and hit head. Blood observed over pts face. Pt on eliquis at home. Pt denies mild R arm pain. No obvious deformities observed. Denies chest pain, SOB, abd pain, back pain, headaches, dizziness, lightheadedness, fevers, chills, nausea, vomiting, diarrhea, constipation and dysuria. RR even and unlabored. Skin warm to touch.

## 2025-02-07 NOTE — ED PROVIDER NOTE - NSFOLLOWUPINSTRUCTIONS_ED_ALL_ED_FT
You were evaluated in the emergency department for a fall. You needed 3 sutures for a laceration. Please get sutures removed in 5 days.     A laceration is a cut that goes through all of the layers of the skin and into the tissue that is right under the skin. Some lacerations heal on their own. Others need to be closed with skin adhesive strips, skin glue, stitches (sutures), or staples. Proper laceration care minimizes the risk of infection and helps the laceration to heal better.  If non-absorbable stitches or staples have been placed, they must be taken out within the time frame instructed by your healthcare provider.    SEEK IMMEDIATE MEDICAL CARE IF YOU HAVE ANY OF THE FOLLOWING SYMPTOMS: swelling around the wound, worsening pain, drainage from the wound, red streaking going away from your wound, inability to move finger or toe near the laceration, or discoloration of skin near the laceration.

## 2025-02-07 NOTE — ED PROVIDER NOTE - CLINICAL SUMMARY MEDICAL DECISION MAKING FREE TEXT BOX
69y Male hx htn, hld, afib on eliquis complaining of fall. Plan for CT. CT negative. 69y Male hx htn, hld, afib on eliquis complaining of fall. Plan for CT. CT negative. 3 sutures placed.

## 2025-02-07 NOTE — ED PROVIDER NOTE - INTERNATIONAL TRAVEL
Home Sleep Study Documentation    HOME STUDY DEVICE: Noxturnal no                                           Dayanna G3 yes      Pre-Sleep Home Study:    Set-up and instructions performed by: Bruce Case performed demonstration for Patient: yes    Return demonstration performed by Patient: yes    Written instructions provided to Patient: yes    Patient signed consent form: yes        Post-Sleep Home Study:    Additional comments by Patient: none    Home Sleep Study Failed:no:    Failure reason: N/A    Reported or Detected: N/A    Scored by:  JOY Redmond No

## 2025-02-07 NOTE — ED PROCEDURE NOTE - LACERATION NUMBER
You are being discharged to [facility] [address] [facility], Phone: [phone]  Triggers you have identified during your hospitalization that led to your suicidal ideation, distressed mood, include grief and limited coping skills  Coping skills you have identified during your hospitalization include spending time with family and crafting  If you are unable to deal with your distressed mood alone please contact your primary care physicain Dr Jose Manuel Crawford MD at , [facility] at , or your daughter Arturo Aguayo at   If that is not effective and you continue to have (ex: suicidal ideation, homicidal ideation, distressed mood, overwhelmed, in crisis) please contact [facility] [employer] at [phone], call [phone], or go to the emergency room  [facility] Crisis Hotline: Hilda Kovacs Suicide Prevention Lifeline:  2-909.727.8999  *Alcohol Anonymous: 858.549.3599  *[place] [employer] Alcohol Commission: (712) 831-9278  [address] [facility]  on [address] [facility] ([facility] [place]: 169.280.4100/Website: www jan org  *Substance Abuse and [address] [facility] [employer], which is a confidential, free, 24-hour-a-day, 365-day-a-year, information service for individuals and family members facing mental health and/or substance use disorders  This service provides referrals to local treatment facilities, support groups, and community-based organizations  Callers can also order free publications and other information  Call 9-144.740.4085/Website: www [facility] gov  *[employer] 2-1-1: This is a toll free, confidential, 24-hour-a-day service which connects you to a community  in your area who can help you find services and resources that are available to you locally and provide critical services that can improve and save lives    Call: 211  /Website: https://[email or web address] [email or web address] [email or web address]/ 3

## 2025-02-07 NOTE — ED ADULT TRIAGE NOTE - CHIEF COMPLAINT QUOTE
BIBA secondary to slip and fall , + headstrike, denies LOC. Pt takes elequis for AFIB. Pt currently AXOX4, Bleeding controlled via EMS. Priority CT called from triage.    uto temp

## 2025-02-07 NOTE — ED PROVIDER NOTE - ATTENDING APP SHARED VISIT CONTRIBUTION OF CARE
69y Male hx htn, hld, afib on eliquis complaining of fall. Patient states he was reaching for an [employer] package today when he fell forward and struck his face. Denies any symptoms beforehand. Denies LOC. Denies chest pain ,sob, N/V/D, abdominal pain, dizziness, lightheadedness, vertigo. Patient has full ROM of ble. A&Ox3.    + forehead lac - repaired. ct head and c spine neg for acute issued. no other trauma. stable for dc

## 2025-06-26 NOTE — ED ADULT NURSE REASSESSMENT NOTE - NS ED NURSE REASSESS COMMENT FT1
rounds frequently provided for pt comfort and safety. no acute distress noted. pt expresses no other needs at this time. ortho came to see pt and splinted LLE. vascular also came to see pt.  no further concerns as of present. call bell within reach. plan of care ongoing. pt made aware/educated on why they are not allowed to eat and/or drink anything after midnight. pt in verbal understanding and is able to show understanding in form of teach back method to RN.
pt resting comfortably showing no signs of respiratory distress or pain, pt is calm and cooperative
pt resting comfortably showing no signs of respiratory distress or pain, pt is calm and cooperative
plan of care assumed @ 1920 from DUSTY SHARP. no S&S of acute distress. presented to ED s/p fall while playing baci. landed on LLE. splinted by EMS LLE. pending provider assessment.
General Sunscreen Counseling: I recommended a broad spectrum sunscreen with a SPF of 30 or higher.  I explained that SPF 30 sunscreens block approximately 97 percent of the sun's harmful rays.  Sunscreens should be applied at least 15 minutes prior to expected sun exposure and then every 2 hours after that as long as sun exposure continues. If swimming or exercising sunscreen should be reapplied every 45 minutes to an hour after getting wet or sweating.  One ounce, or the equivalent of a shot glass full of sunscreen, is adequate to protect the skin not covered by a bathing suit. I also recommended a lip balm with a sunscreen as well. Sun protective clothing can be used in lieu of sunscreen but must be worn the entire time you are exposed to the sun's rays.
Detail Level: Detailed
